# Patient Record
Sex: FEMALE | Race: WHITE | NOT HISPANIC OR LATINO | Employment: OTHER | URBAN - METROPOLITAN AREA
[De-identification: names, ages, dates, MRNs, and addresses within clinical notes are randomized per-mention and may not be internally consistent; named-entity substitution may affect disease eponyms.]

---

## 2018-01-26 ENCOUNTER — OFFICE VISIT (OUTPATIENT)
Dept: URGENT CARE | Facility: MEDICAL CENTER | Age: 68
End: 2018-01-26
Payer: MEDICARE

## 2018-01-26 VITALS
RESPIRATION RATE: 16 BRPM | TEMPERATURE: 98.5 F | DIASTOLIC BLOOD PRESSURE: 78 MMHG | SYSTOLIC BLOOD PRESSURE: 114 MMHG | HEART RATE: 83 BPM | OXYGEN SATURATION: 97 %

## 2018-01-26 DIAGNOSIS — H61.21 HEARING LOSS SECONDARY TO CERUMEN IMPACTION, RIGHT: Primary | ICD-10-CM

## 2018-01-26 PROCEDURE — 69209 REMOVE IMPACTED EAR WAX UNI: CPT | Performed by: PHYSICIAN ASSISTANT

## 2018-01-26 PROCEDURE — G0463 HOSPITAL OUTPT CLINIC VISIT: HCPCS | Performed by: PHYSICIAN ASSISTANT

## 2018-01-26 PROCEDURE — 99213 OFFICE O/P EST LOW 20 MIN: CPT | Performed by: PHYSICIAN ASSISTANT

## 2018-01-26 NOTE — PROGRESS NOTES
Assessment/Plan:      Diagnoses and all orders for this visit:    Hearing loss secondary to cerumen impaction, right    Other orders  -     Ear cerumen removal      Ear cerumen removal  Date/Time: 1/26/2018 6:15 PM  Performed by: Ranjana Lyons  Authorized by: Ranjana Lyons     Patient location:  Clinic  Indications / Diagnosis:  Hearing loss secondary to cerumen impaction  Other Assisting Provider: No    Consent:     Consent obtained:  Verbal    Consent given by:  Patient    Risks discussed:  Dizziness, incomplete removal, pain and TM perforation    Alternatives discussed:  No treatment  Universal protocol:     Procedure explained and questions answered to patient or proxy's satisfaction: yes      Patient identity confirmed:  Verbally with patient  Procedure details:     Location:  R ear and L ear    Procedure type: curette      Procedure type comment:  Currette and irrigation  Post-procedure details:     Complication:  None    Hearing quality:  Improved    Patient tolerance of procedure: Tolerated well, no immediate complications          Patient Instructions   Ears were flushed with water  Patient tolerated procedure well  You may try Debrox drops or a mixture of half water half hydrogen peroxide to avoid wax build up in the future  Follow up with your PCP as needed  Subjective:     Patient ID: Horacio Gama is a 74 y o  female  This is a 74year old female presenting for hearing loss secondary to cerumen impaction of the right ear  This has happened to her in the past, always in the right ear  She has noticed it gradually worsening over the last couple of weeks  No pain associated with this  Review of Systems   Constitutional: Negative  HENT: Positive for hearing loss  Negative for congestion, ear discharge, ear pain, postnasal drip, rhinorrhea, sinus pain, sinus pressure and sore throat  Respiratory: Negative  Cardiovascular: Negative  Neurological: Negative  Objective:  Vitals:    01/26/18 1749   BP: 114/78   Pulse: 83   Resp: 16   Temp: 98 5 °F (36 9 °C)   SpO2: 97%        Physical Exam   Constitutional: She appears well-developed and well-nourished  No distress  HENT:   Head: Normocephalic and atraumatic  Right Ear: External ear normal  No drainage, swelling or tenderness  No foreign bodies  No mastoid tenderness  Decreased hearing is noted  Left Ear: Tympanic membrane, external ear and ear canal normal    Ears:    Nose: Nose normal    Mouth/Throat: Oropharynx is clear and moist  No oropharyngeal exudate  Eyes: Conjunctivae and EOM are normal  Pupils are equal, round, and reactive to light  Cardiovascular: Normal rate, regular rhythm and normal heart sounds  Pulmonary/Chest: Effort normal and breath sounds normal    Vitals reviewed

## 2018-01-26 NOTE — PATIENT INSTRUCTIONS
Ears were flushed with water  Patient tolerated procedure well  You may try Debrox drops or a mixture of half water half hydrogen peroxide to avoid wax build up in the future  Follow up with your PCP as needed  Cerumen Impaction   WHAT YOU NEED TO KNOW:   What is cerumen impaction? Cerumen impaction is the blockage of the outer ear canal by tightly packed cerumen (earwax)  What causes cerumen impaction? Anything that affects the normal outward flow of cerumen may cause impaction  The following factors may make it more likely for earwax to become impacted:  · Advanced age     · Conditions that produce too much cerumen, such as keratosis and other skin diseases    · Narrow or abnormally shaped ear canals    · Use of a hearing aid    · Incorrect use of cotton swabs, or using needles, hair pins, or other objects to clean the ears  What are the signs and symptoms of cerumen impaction? · Trouble hearing    · Dizziness    · Ear fullness or a feeling that something is plugging up your ear    · Itchiness or pain in the ears    · Ringing in the ears  How is cerumen impaction diagnosed? Your healthcare provider will ask about your medical history  This includes any ear problems or procedures you may have had  Your healthcare provider will carefully check your ears using a scope with a light  Your healthcare provider may look for other problems, such as bleeding, infection, or injury  Your eardrums will be checked for tears or holes  Your healthcare provider may also test your hearing  How is cerumen impaction treated? The goal of treatment is to remove the hardened wax  The type of treatment to be used may depend on your age, symptoms, or risk factors  Ask your healthcare provider which of the following treatments may be best for you:  · Ear drops:  Ear drops may be used to clear or soften the impacted earwax  This may be used alone or in combination with a procedure to take out the earwax      · Procedures: When the impaction can be clearly seen, removal may be done using any of the following:    ¨ Irrigation:  Warm water from a syringe is used to wash the wax out of the ear canal  Irrigation may not be used on people with an eardrum tear, infection, or who have had ear surgery  ¨ Suction:  A small plastic tube that is connected to a machine is used to suck the impacted wax out of your ear  ¨ Instruments:  Your healthcare provider may use a curette (scoop-like instrument) or forceps to remove the impacted wax  What are the risks of having a cerumen impaction? · Procedures to remove the wax may cause bleeding and infection  The ear canal may be scraped and scratched or the eardrum may be injured, which may cause loss of hearing  · Untreated impacted cerumen may cause your symptoms to become worse  If it is not removed, impacted cerumen may cause an infection, irritation, loss of hearing, or further ear problems  When should I contact my healthcare provider? · You have a fever  · You have trouble hearing or hear ringing noises  · You have questions or concerns about your condition or care  When should I seek immediate care? · You feel dizzy  · You have discharge or blood coming out of your ear  · Your ear pain does not go away or gets worse  CARE AGREEMENT:   You have the right to help plan your care  Learn about your health condition and how it may be treated  Discuss treatment options with your caregivers to decide what care you want to receive  You always have the right to refuse treatment  The above information is an  only  It is not intended as medical advice for individual conditions or treatments  Talk to your doctor, nurse or pharmacist before following any medical regimen to see if it is safe and effective for you  © 2017 [employer] Information is for End User's use only and may not be sold, redistributed or otherwise used for commercial purposes  All illustrations and images included in CareNotes® are the copyrighted property of [employer]  or Sukhdev Amaro

## 2020-04-01 ENCOUNTER — APPOINTMENT (EMERGENCY)
Dept: CT IMAGING | Facility: HOSPITAL | Age: 70
End: 2020-04-01
Payer: MEDICARE

## 2020-04-01 ENCOUNTER — HOSPITAL ENCOUNTER (EMERGENCY)
Facility: HOSPITAL | Age: 70
Discharge: HOME | End: 2020-04-01
Attending: EMERGENCY MEDICINE | Admitting: EMERGENCY MEDICINE
Payer: MEDICARE

## 2020-04-01 VITALS
WEIGHT: 192.68 LBS | BODY MASS INDEX: 31.1 KG/M2 | RESPIRATION RATE: 16 BRPM | OXYGEN SATURATION: 96 % | HEART RATE: 54 BPM | DIASTOLIC BLOOD PRESSURE: 64 MMHG | SYSTOLIC BLOOD PRESSURE: 128 MMHG | TEMPERATURE: 98 F

## 2020-04-01 DIAGNOSIS — R00.1 BRADYCARDIA: ICD-10-CM

## 2020-04-01 DIAGNOSIS — R11.0 NAUSEA: ICD-10-CM

## 2020-04-01 DIAGNOSIS — R42 DIZZINESS: Primary | ICD-10-CM

## 2020-04-01 LAB
ALBUMIN SERPL BCP-MCNC: 4.2 G/DL (ref 3.5–5)
ALP SERPL-CCNC: 75 U/L (ref 46–116)
ALT SERPL W P-5'-P-CCNC: 36 U/L (ref 12–78)
ANION GAP SERPL CALCULATED.3IONS-SCNC: 11 MMOL/L (ref 4–13)
APTT PPP: 31 SECONDS (ref 23–37)
AST SERPL W P-5'-P-CCNC: 16 U/L (ref 5–45)
ATRIAL RATE: 62 BPM
BASOPHILS # BLD AUTO: 0.03 THOUSANDS/ΜL (ref 0–0.1)
BASOPHILS NFR BLD AUTO: 0 % (ref 0–1)
BILIRUB SERPL-MCNC: 0.83 MG/DL (ref 0.2–1)
BUN SERPL-MCNC: 24 MG/DL (ref 5–25)
CALCIUM SERPL-MCNC: 9.3 MG/DL (ref 8.3–10.1)
CHLORIDE SERPL-SCNC: 104 MMOL/L (ref 100–108)
CO2 SERPL-SCNC: 25 MMOL/L (ref 21–32)
CREAT SERPL-MCNC: 0.96 MG/DL (ref 0.6–1.3)
EOSINOPHIL # BLD AUTO: 0.02 THOUSAND/ΜL (ref 0–0.61)
EOSINOPHIL NFR BLD AUTO: 0 % (ref 0–6)
ERYTHROCYTE [DISTWIDTH] IN BLOOD BY AUTOMATED COUNT: 13.8 % (ref 11.6–15.1)
GFR SERPL CREATININE-BSD FRML MDRD: 61 ML/MIN/1.73SQ M
GLUCOSE SERPL-MCNC: 103 MG/DL (ref 65–140)
HCT VFR BLD AUTO: 49 % (ref 34.8–46.1)
HGB BLD-MCNC: 16 G/DL (ref 11.5–15.4)
IMM GRANULOCYTES # BLD AUTO: 0.01 THOUSAND/UL (ref 0–0.2)
IMM GRANULOCYTES NFR BLD AUTO: 0 % (ref 0–2)
INR PPP: 1.1 (ref 0.84–1.19)
LYMPHOCYTES # BLD AUTO: 1.86 THOUSANDS/ΜL (ref 0.6–4.47)
LYMPHOCYTES NFR BLD AUTO: 21 % (ref 14–44)
MCH RBC QN AUTO: 29.3 PG (ref 26.8–34.3)
MCHC RBC AUTO-ENTMCNC: 32.7 G/DL (ref 31.4–37.4)
MCV RBC AUTO: 90 FL (ref 82–98)
MONOCYTES # BLD AUTO: 0.5 THOUSAND/ΜL (ref 0.17–1.22)
MONOCYTES NFR BLD AUTO: 6 % (ref 4–12)
NEUTROPHILS # BLD AUTO: 6.6 THOUSANDS/ΜL (ref 1.85–7.62)
NEUTS SEG NFR BLD AUTO: 73 % (ref 43–75)
NRBC BLD AUTO-RTO: 0 /100 WBCS
P AXIS: 49 DEGREES
PLATELET # BLD AUTO: 359 THOUSANDS/UL (ref 149–390)
PMV BLD AUTO: 9.7 FL (ref 8.9–12.7)
POTASSIUM SERPL-SCNC: 3.8 MMOL/L (ref 3.5–5.3)
PR INTERVAL: 166 MS
PROT SERPL-MCNC: 8.2 G/DL (ref 6.4–8.2)
PROTHROMBIN TIME: 13.6 SECONDS (ref 11.6–14.5)
QRS AXIS: 35 DEGREES
QRSD INTERVAL: 92 MS
QT INTERVAL: 470 MS
QTC INTERVAL: 466 MS
RBC # BLD AUTO: 5.47 MILLION/UL (ref 3.81–5.12)
SODIUM SERPL-SCNC: 140 MMOL/L (ref 136–145)
T WAVE AXIS: 13 DEGREES
TROPONIN I SERPL-MCNC: <0.02 NG/ML
TSH SERPL DL<=0.05 MIU/L-ACNC: 2.62 UIU/ML (ref 0.36–3.74)
VENTRICULAR RATE: 59 BPM
WBC # BLD AUTO: 9.02 THOUSAND/UL (ref 4.31–10.16)

## 2020-04-01 PROCEDURE — 85025 COMPLETE CBC W/AUTO DIFF WBC: CPT | Performed by: EMERGENCY MEDICINE

## 2020-04-01 PROCEDURE — 70498 CT ANGIOGRAPHY NECK: CPT

## 2020-04-01 PROCEDURE — 93010 ELECTROCARDIOGRAM REPORT: CPT | Performed by: INTERNAL MEDICINE

## 2020-04-01 PROCEDURE — 99285 EMERGENCY DEPT VISIT HI MDM: CPT

## 2020-04-01 PROCEDURE — 96374 THER/PROPH/DIAG INJ IV PUSH: CPT

## 2020-04-01 PROCEDURE — 36415 COLL VENOUS BLD VENIPUNCTURE: CPT | Performed by: EMERGENCY MEDICINE

## 2020-04-01 PROCEDURE — 93005 ELECTROCARDIOGRAM TRACING: CPT

## 2020-04-01 PROCEDURE — 85610 PROTHROMBIN TIME: CPT | Performed by: EMERGENCY MEDICINE

## 2020-04-01 PROCEDURE — 70496 CT ANGIOGRAPHY HEAD: CPT

## 2020-04-01 PROCEDURE — 85730 THROMBOPLASTIN TIME PARTIAL: CPT | Performed by: EMERGENCY MEDICINE

## 2020-04-01 PROCEDURE — 84484 ASSAY OF TROPONIN QUANT: CPT | Performed by: EMERGENCY MEDICINE

## 2020-04-01 PROCEDURE — 84443 ASSAY THYROID STIM HORMONE: CPT | Performed by: EMERGENCY MEDICINE

## 2020-04-01 PROCEDURE — 96361 HYDRATE IV INFUSION ADD-ON: CPT

## 2020-04-01 PROCEDURE — 80053 COMPREHEN METABOLIC PANEL: CPT | Performed by: EMERGENCY MEDICINE

## 2020-04-01 PROCEDURE — 99284 EMERGENCY DEPT VISIT MOD MDM: CPT | Performed by: EMERGENCY MEDICINE

## 2020-04-01 RX ORDER — ONDANSETRON 2 MG/ML
4 INJECTION INTRAMUSCULAR; INTRAVENOUS ONCE
Status: COMPLETED | OUTPATIENT
Start: 2020-04-01 | End: 2020-04-01

## 2020-04-01 RX ORDER — MECLIZINE HCL 12.5 MG/1
25 TABLET ORAL ONCE
Status: COMPLETED | OUTPATIENT
Start: 2020-04-01 | End: 2020-04-01

## 2020-04-01 RX ORDER — MECLIZINE HYDROCHLORIDE 25 MG/1
25 TABLET ORAL 3 TIMES DAILY PRN
Qty: 15 TABLET | Refills: 0 | Status: SHIPPED | OUTPATIENT
Start: 2020-04-01 | End: 2020-04-03 | Stop reason: SDUPTHER

## 2020-04-01 RX ADMIN — MECLIZINE 25 MG: 12.5 TABLET ORAL at 15:36

## 2020-04-01 RX ADMIN — IOHEXOL 100 ML: 350 INJECTION, SOLUTION INTRAVENOUS at 16:24

## 2020-04-01 RX ADMIN — ONDANSETRON 4 MG: 2 INJECTION INTRAMUSCULAR; INTRAVENOUS at 15:37

## 2020-04-01 RX ADMIN — SODIUM CHLORIDE 1000 ML: 0.9 INJECTION, SOLUTION INTRAVENOUS at 15:36

## 2020-04-03 ENCOUNTER — TELEMEDICINE (OUTPATIENT)
Dept: INTERNAL MEDICINE CLINIC | Facility: CLINIC | Age: 70
End: 2020-04-03
Payer: MEDICARE

## 2020-04-03 ENCOUNTER — TELEPHONE (OUTPATIENT)
Dept: OTHER | Facility: OTHER | Age: 70
End: 2020-04-03

## 2020-04-03 DIAGNOSIS — R00.1 BRADYCARDIA: Primary | ICD-10-CM

## 2020-04-03 DIAGNOSIS — R42 DIZZINESS: ICD-10-CM

## 2020-04-03 PROCEDURE — 99201 PR OFFICE OUTPATIENT NEW 10 MINUTES: CPT | Performed by: NURSE PRACTITIONER

## 2020-04-03 RX ORDER — MECLIZINE HYDROCHLORIDE 25 MG/1
25 TABLET ORAL 3 TIMES DAILY PRN
Qty: 15 TABLET | Refills: 0 | Status: SHIPPED | OUTPATIENT
Start: 2020-04-03 | End: 2020-07-24 | Stop reason: ALTCHOICE

## 2020-07-24 ENCOUNTER — OFFICE VISIT (OUTPATIENT)
Dept: INTERNAL MEDICINE CLINIC | Facility: CLINIC | Age: 70
End: 2020-07-24
Payer: MEDICARE

## 2020-07-24 VITALS
TEMPERATURE: 97.3 F | SYSTOLIC BLOOD PRESSURE: 130 MMHG | HEART RATE: 67 BPM | BODY MASS INDEX: 32.32 KG/M2 | OXYGEN SATURATION: 95 % | WEIGHT: 194 LBS | HEIGHT: 65 IN | DIASTOLIC BLOOD PRESSURE: 80 MMHG

## 2020-07-24 DIAGNOSIS — Z11.59 NEED FOR HEPATITIS C SCREENING TEST: ICD-10-CM

## 2020-07-24 DIAGNOSIS — Z13.820 SCREENING FOR OSTEOPOROSIS: ICD-10-CM

## 2020-07-24 DIAGNOSIS — Z23 NEED FOR VACCINATION: ICD-10-CM

## 2020-07-24 DIAGNOSIS — Z00.00 MEDICARE ANNUAL WELLNESS VISIT, SUBSEQUENT: ICD-10-CM

## 2020-07-24 DIAGNOSIS — E66.9 OBESITY (BMI 30.0-34.9): ICD-10-CM

## 2020-07-24 DIAGNOSIS — Z13.220 SCREENING, LIPID: ICD-10-CM

## 2020-07-24 DIAGNOSIS — H81.10 BENIGN PAROXYSMAL POSITIONAL VERTIGO, UNSPECIFIED LATERALITY: Primary | ICD-10-CM

## 2020-07-24 DIAGNOSIS — Z12.31 ENCOUNTER FOR SCREENING MAMMOGRAM FOR BREAST CANCER: ICD-10-CM

## 2020-07-24 PROCEDURE — 1125F AMNT PAIN NOTED PAIN PRSNT: CPT | Performed by: INTERNAL MEDICINE

## 2020-07-24 PROCEDURE — 1123F ACP DISCUSS/DSCN MKR DOCD: CPT

## 2020-07-24 PROCEDURE — G0009 ADMIN PNEUMOCOCCAL VACCINE: HCPCS

## 2020-07-24 PROCEDURE — 1170F FXNL STATUS ASSESSED: CPT | Performed by: INTERNAL MEDICINE

## 2020-07-24 PROCEDURE — 3008F BODY MASS INDEX DOCD: CPT | Performed by: INTERNAL MEDICINE

## 2020-07-24 PROCEDURE — 1160F RVW MEDS BY RX/DR IN RCRD: CPT | Performed by: INTERNAL MEDICINE

## 2020-07-24 PROCEDURE — 99214 OFFICE O/P EST MOD 30 MIN: CPT | Performed by: INTERNAL MEDICINE

## 2020-07-24 PROCEDURE — 4040F PNEUMOC VAC/ADMIN/RCVD: CPT | Performed by: INTERNAL MEDICINE

## 2020-07-24 PROCEDURE — G0438 PPPS, INITIAL VISIT: HCPCS | Performed by: INTERNAL MEDICINE

## 2020-07-24 PROCEDURE — 90670 PCV13 VACCINE IM: CPT

## 2020-07-24 PROCEDURE — 1036F TOBACCO NON-USER: CPT | Performed by: INTERNAL MEDICINE

## 2020-07-24 NOTE — PROGRESS NOTES
Assessment and Plan:     Problem List Items Addressed This Visit     None      Visit Diagnoses     Benign paroxysmal positional vertigo, unspecified laterality    -  Primary    Obesity (BMI 30 0-34 9)        Need for hepatitis C screening test        Relevant Orders    Hepatitis C antibody    Screening, lipid        Relevant Orders    Lipid Panel with Direct LDL reflex    Encounter for screening mammogram for breast cancer        Relevant Orders    Mammo screening bilateral w cad    Need for vaccination        Relevant Orders    PNEUMOCOCCAL CONJUGATE VACCINE 13-VALENT GREATER THAN 6 MONTHS (Completed)    Screening for osteoporosis        Relevant Orders    DXA bone density spine hip and pelvis    Medicare annual wellness visit, subsequent            BMI Counseling: Body mass index is 32 28 kg/m²  The BMI is above normal  Nutrition recommendations include decreasing portion sizes  Exercise recommendations include exercising 3-5 times per week  Preventive health issues were discussed with patient, and age appropriate screening tests were ordered as noted in patient's After Visit Summary  Personalized health advice and appropriate referrals for health education or preventive services given if needed, as noted in patient's After Visit Summary  History of Present Illness:     Patient presents for Medicare Annual Wellness visit    Patient Care Team:  Sheeba Sánchez MD as PCP - General (Internal Medicine)     Problem List:     Patient Active Problem List   Diagnosis    Bradycardia      Past Medical and Surgical History:     Past Medical History:   Diagnosis Date    Vertigo      History reviewed  No pertinent surgical history  Family History:     Family History   Problem Relation Age of Onset    Dementia Mother     Heart attack Father     Brain cancer Maternal Aunt     Coronary artery disease Maternal Uncle       Social History:        Social History     Socioeconomic History    Marital status:   Spouse name: None    Number of children: None    Years of education: None    Highest education level: None   Occupational History    None   Social Needs    Financial resource strain: None    Food insecurity:     Worry: None     Inability: None    Transportation needs:     Medical: None     Non-medical: None   Tobacco Use    Smoking status: Never Smoker    Smokeless tobacco: Never Used   Substance and Sexual Activity    Alcohol use: Yes     Comment: occasionally    Drug use: None    Sexual activity: None   Lifestyle    Physical activity:     Days per week: None     Minutes per session: None    Stress: None   Relationships    Social connections:     Talks on phone: None     Gets together: None     Attends [employer] service: None     Active member of club or organization: None     Attends meetings of clubs or organizations: None     Relationship status: None    Intimate partner violence:     Fear of current or ex partner: None     Emotionally abused: None     Physically abused: None     Forced sexual activity: None   Other Topics Concern    None   Social History Narrative    None      Medications and Allergies:     No current outpatient medications on file  No current facility-administered medications for this visit  No Known Allergies   Immunizations:     Immunization History   Administered Date(s) Administered    INFLUENZA 10/04/2017    Pneumococcal Conjugate 13-Valent 07/24/2020      Health Maintenance:         Topic Date Due    Hepatitis C Screening  1950    MAMMOGRAM  1950    CRC Screening: Colonoscopy  1950         Topic Date Due    Pneumococcal Vaccine: 65+ Years (1 of 2 - PCV13) 06/28/2017    Influenza Vaccine  07/01/2020      Medicare Health Risk Assessment:     /80   Pulse 67   Temp (!) 97 3 °F (36 3 °C)   Ht 5' 5" (1 651 m)   Wt 88 kg (194 lb)   SpO2 95%   BMI 32 28 kg/m²      Dillon Raphaelgonzalo is here for her Subsequent Wellness visit       Health Risk Assessment:   Patient rates overall health as very good  Patient feels that their physical health rating is same  Eyesight was rated as same  Hearing was rated as same  Patient feels that their emotional and mental health rating is same  Pain experienced in the last 7 days has been none  Patient states that she has experienced no weight loss or gain in last 6 months  Depression Screening:   PHQ-2 Score: 0      Fall Risk Screening: In the past year, patient has experienced: no history of falling in past year      Urinary Incontinence Screening:   Patient has not leaked urine accidently in the last six months  Home Safety:  Patient does not have trouble with stairs inside or outside of their home  Patient has working smoke alarms and has working carbon monoxide detector  Home safety hazards include: none  Nutrition:   Weight Watchers and low calorie    Medications:   Patient is not currently taking any over-the-counter supplements  Patient is able to manage medications  Activities of Daily Living (ADLs)/Instrumental Activities of Daily Living (IADLs):   Walk and transfer into and out of bed and chair?: Yes  Dress and groom yourself?: Yes    Bathe or shower yourself?: Yes    Feed yourself?  Yes  Do your laundry/housekeeping?: Yes  Manage your money, pay your bills and track your expenses?: Yes  Make your own meals?: Yes    Do your own shopping?: Yes    Durable Medical Equipment Suppliers  none    Previous Hospitalizations:   Any hospitalizations or ED visits within the last 12 months?: Yes    How many hospitalizations have you had in the last year?: 1-2    Advance Care Planning:   Living will: Yes    Advanced directive: Yes      Cognitive Screening:   Provider or family/friend/caregiver concerned regarding cognition?: No    PREVENTIVE SCREENINGS      Cardiovascular Screening:      Due for: Lipid Panel      Diabetes Screening:     General: Screening Current      Colorectal Cancer Screening:     General: Screening Current      Breast Cancer Screening:     General: Screening Current      Cervical Cancer Screening:    General: Screening Not Indicated      Osteoporosis Screening:    General: Risks and Benefits Discussed    Due for: DXA Axial      Abdominal Aortic Aneurysm (AAA) Screening:        General: Screening Not Indicated      Lung Cancer Screening:     General: Screening Not Indicated      Hepatitis C Screening:    General: Risks and Benefits Discussed    Hep C Screening Accepted: Yes        Merlinda Abraham, MD

## 2020-07-24 NOTE — PROGRESS NOTES
Assessment/Plan:  BPPV resolved  Call if vertigo recurs       Problem List Items Addressed This Visit     None      Visit Diagnoses     Benign paroxysmal positional vertigo, unspecified laterality    -  Primary    Obesity (BMI 30 0-34 9)        Need for hepatitis C screening test        Relevant Orders    Hepatitis C antibody    Screening, lipid        Relevant Orders    Lipid Panel with Direct LDL reflex    Encounter for screening mammogram for breast cancer        Relevant Orders    Mammo screening bilateral w cad    Need for vaccination        Relevant Orders    PNEUMOCOCCAL CONJUGATE VACCINE 13-VALENT GREATER THAN 6 MONTHS (Completed)    Screening for osteoporosis        Relevant Orders    DXA bone density spine hip and pelvis    Medicare annual wellness visit, subsequent                Subjective:      Patient ID: Mireya Bustamante is a 78 y o  female  Here to establish care with me  She was in the ER in April for sudden vertigo  She woke up with the room spinning and called 911  CTA of head and neck normal, EKG sinus katy HR 59  Blood work normal  Meclizine prescribed and helped  This resolved after 3 weeks  Generally healthy  She saw a PCP in [place] but has not seen him in >1 year  She saw a nutritionist in  and has lost 42lbs since-Weight Watchers and low calorie  Also exercising  Lives with son and daughter in law  ,   at 45 of a heart attack    The following portions of the patient's history were reviewed and updated as appropriate: allergies, current medications, past family history, past medical history, past social history, past surgical history and problem list     Review of Systems   Constitutional: Negative for chills and fever  HENT: Negative for congestion and hearing loss  Respiratory: Negative for cough and shortness of breath  Cardiovascular: Negative for chest pain and palpitations  Gastrointestinal: Negative for blood in stool, constipation and diarrhea  Genitourinary: Negative for difficulty urinating, dysuria, hematuria and vaginal bleeding  Musculoskeletal: Negative for arthralgias and myalgias  Neurological: Negative for dizziness and headaches  Psychiatric/Behavioral: Negative for dysphoric mood and sleep disturbance  The patient is not nervous/anxious  Objective:      /80   Pulse 67   Temp (!) 97 3 °F (36 3 °C)   Ht 5' 5" (1 651 m)   Wt 88 kg (194 lb)   SpO2 95%   BMI 32 28 kg/m²          Physical Exam   Constitutional: She is oriented to person, place, and time  She appears well-developed and well-nourished  No distress  HENT:   Head: Normocephalic and atraumatic  Right Ear: External ear normal    Left Ear: External ear normal    Moderate cerumen AD   Eyes: Conjunctivae are normal    Neck: Neck supple  Cardiovascular: Normal rate, regular rhythm and normal heart sounds  No murmur heard  Pulmonary/Chest: Effort normal and breath sounds normal  No stridor  No respiratory distress  She has no wheezes  She has no rales  Abdominal: Soft  She exhibits no distension and no mass  There is no tenderness  There is no rebound and no guarding  Musculoskeletal: She exhibits no edema  Neurological: She is alert and oriented to person, place, and time  Skin: Skin is warm and dry  She is not diaphoretic  Psychiatric: She has a normal mood and affect  Her behavior is normal  Judgment and thought content normal    Vitals reviewed

## 2020-07-27 ENCOUNTER — APPOINTMENT (OUTPATIENT)
Dept: LAB | Facility: MEDICAL CENTER | Age: 70
End: 2020-07-27
Payer: MEDICARE

## 2020-07-27 LAB
CHOLEST SERPL-MCNC: 245 MG/DL (ref 50–200)
HCV AB SER QL: NORMAL
HDLC SERPL-MCNC: 61 MG/DL
LDLC SERPL CALC-MCNC: 159 MG/DL (ref 0–100)
TRIGL SERPL-MCNC: 123 MG/DL

## 2020-07-27 PROCEDURE — 80061 LIPID PANEL: CPT | Performed by: INTERNAL MEDICINE

## 2020-07-27 PROCEDURE — 86803 HEPATITIS C AB TEST: CPT | Performed by: INTERNAL MEDICINE

## 2020-07-27 PROCEDURE — 36415 COLL VENOUS BLD VENIPUNCTURE: CPT | Performed by: INTERNAL MEDICINE

## 2020-12-21 ENCOUNTER — TELEMEDICINE (OUTPATIENT)
Dept: INTERNAL MEDICINE CLINIC | Facility: CLINIC | Age: 70
End: 2020-12-21
Payer: MEDICARE

## 2020-12-21 DIAGNOSIS — N30.00 ACUTE CYSTITIS WITHOUT HEMATURIA: Primary | ICD-10-CM

## 2020-12-21 PROCEDURE — 99213 OFFICE O/P EST LOW 20 MIN: CPT | Performed by: INTERNAL MEDICINE

## 2020-12-21 RX ORDER — NITROFURANTOIN 25; 75 MG/1; MG/1
100 CAPSULE ORAL 2 TIMES DAILY
Qty: 10 CAPSULE | Refills: 0 | Status: SHIPPED | OUTPATIENT
Start: 2020-12-21 | End: 2020-12-26

## 2021-01-19 ENCOUNTER — TELEPHONE (OUTPATIENT)
Dept: INTERNAL MEDICINE CLINIC | Facility: CLINIC | Age: 71
End: 2021-01-19

## 2021-01-19 NOTE — TELEPHONE ENCOUNTER
Patient called requesting more information about the COVID vaccine  I d/w her that I recommend she get it once it is available to her  She has no documented allergies to other vaccines so there is no reason for her to not get it  No long term side effects are known at this time  It is possible that this is a vaccine we all will need to get again  Local injection site reactions can happen as well as flu like symptoms  Her questions were answered  She is reassured and will sign up to register for it

## 2021-03-10 DIAGNOSIS — Z23 ENCOUNTER FOR IMMUNIZATION: ICD-10-CM

## 2021-03-18 ENCOUNTER — IMMUNIZATIONS (OUTPATIENT)
Dept: FAMILY MEDICINE CLINIC | Facility: HOSPITAL | Age: 71
End: 2021-03-18

## 2021-03-18 DIAGNOSIS — Z23 ENCOUNTER FOR IMMUNIZATION: Primary | ICD-10-CM

## 2021-03-18 PROCEDURE — 91300 SARS-COV-2 / COVID-19 MRNA VACCINE (PFIZER-BIONTECH) 30 MCG: CPT

## 2021-03-18 PROCEDURE — 0001A SARS-COV-2 / COVID-19 MRNA VACCINE (PFIZER-BIONTECH) 30 MCG: CPT

## 2021-04-11 ENCOUNTER — IMMUNIZATIONS (OUTPATIENT)
Dept: FAMILY MEDICINE CLINIC | Facility: HOSPITAL | Age: 71
End: 2021-04-11

## 2021-04-11 DIAGNOSIS — Z23 ENCOUNTER FOR IMMUNIZATION: Primary | ICD-10-CM

## 2021-04-11 PROCEDURE — 0002A SARS-COV-2 / COVID-19 MRNA VACCINE (PFIZER-BIONTECH) 30 MCG: CPT

## 2021-04-11 PROCEDURE — 91300 SARS-COV-2 / COVID-19 MRNA VACCINE (PFIZER-BIONTECH) 30 MCG: CPT

## 2021-05-04 ENCOUNTER — HOSPITAL ENCOUNTER (OUTPATIENT)
Dept: RADIOLOGY | Facility: MEDICAL CENTER | Age: 71
Discharge: HOME | End: 2021-05-04
Payer: MEDICARE

## 2021-05-04 DIAGNOSIS — Z13.820 SCREENING FOR OSTEOPOROSIS: ICD-10-CM

## 2021-05-04 PROCEDURE — 77080 DXA BONE DENSITY AXIAL: CPT

## 2021-07-12 ENCOUNTER — HOSPITAL ENCOUNTER (OUTPATIENT)
Dept: RADIOLOGY | Facility: MEDICAL CENTER | Age: 71
Discharge: HOME | End: 2021-07-12
Payer: MEDICARE

## 2021-07-12 VITALS — BODY MASS INDEX: 32.32 KG/M2 | WEIGHT: 194 LBS | HEIGHT: 65 IN

## 2021-07-12 DIAGNOSIS — Z12.31 ENCOUNTER FOR SCREENING MAMMOGRAM FOR BREAST CANCER: ICD-10-CM

## 2021-07-12 PROCEDURE — 77063 BREAST TOMOSYNTHESIS BI: CPT

## 2021-07-12 PROCEDURE — 77067 SCR MAMMO BI INCL CAD: CPT

## 2021-07-26 RX ORDER — IBUPROFEN 600 MG/1
TABLET ORAL
COMMUNITY
Start: 2021-06-17 | End: 2022-08-01 | Stop reason: ALTCHOICE

## 2021-07-27 ENCOUNTER — OFFICE VISIT (OUTPATIENT)
Dept: INTERNAL MEDICINE CLINIC | Facility: CLINIC | Age: 71
End: 2021-07-27
Payer: MEDICARE

## 2021-07-27 VITALS
DIASTOLIC BLOOD PRESSURE: 82 MMHG | BODY MASS INDEX: 33.29 KG/M2 | WEIGHT: 195 LBS | HEART RATE: 73 BPM | HEIGHT: 64 IN | SYSTOLIC BLOOD PRESSURE: 132 MMHG | OXYGEN SATURATION: 94 %

## 2021-07-27 DIAGNOSIS — H61.21 IMPACTED CERUMEN OF RIGHT EAR: ICD-10-CM

## 2021-07-27 DIAGNOSIS — Z00.00 MEDICARE ANNUAL WELLNESS VISIT, SUBSEQUENT: Primary | ICD-10-CM

## 2021-07-27 DIAGNOSIS — Z23 ENCOUNTER FOR IMMUNIZATION: ICD-10-CM

## 2021-07-27 DIAGNOSIS — Z12.31 ENCOUNTER FOR SCREENING MAMMOGRAM FOR BREAST CANCER: ICD-10-CM

## 2021-07-27 DIAGNOSIS — E78.2 MIXED HYPERLIPIDEMIA: ICD-10-CM

## 2021-07-27 PROCEDURE — G0009 ADMIN PNEUMOCOCCAL VACCINE: HCPCS

## 2021-07-27 PROCEDURE — 69210 REMOVE IMPACTED EAR WAX UNI: CPT | Performed by: INTERNAL MEDICINE

## 2021-07-27 PROCEDURE — G0439 PPPS, SUBSEQ VISIT: HCPCS | Performed by: INTERNAL MEDICINE

## 2021-07-27 PROCEDURE — 90732 PPSV23 VACC 2 YRS+ SUBQ/IM: CPT

## 2021-07-27 PROCEDURE — 1123F ACP DISCUSS/DSCN MKR DOCD: CPT

## 2021-07-27 NOTE — PROGRESS NOTES
Assessment and Plan:     Problem List Items Addressed This Visit     None      Visit Diagnoses     Medicare annual wellness visit, subsequent    -  Primary    Encounter for immunization        Relevant Orders    PNEUMOCOCCAL POLYSACCHARIDE VACCINE 23-VALENT =>1YO SQ IM (Completed)    Mixed hyperlipidemia        Relevant Orders    CBC and differential    Comprehensive metabolic panel    Lipid Panel with Direct LDL reflex    Encounter for screening mammogram for breast cancer        Relevant Orders    Mammo screening bilateral w 3d & cad    Impacted cerumen of right ear        Relevant Orders    Ear cerumen removal        BMI Counseling: Body mass index is 33 47 kg/m²  The BMI is above normal  Nutrition recommendations include reducing intake of cholesterol  Preventive health issues were discussed with patient, and age appropriate screening tests were ordered as noted in patient's After Visit Summary  Personalized health advice and appropriate referrals for health education or preventive services given if needed, as noted in patient's After Visit Summary  History of Present Illness:     Patient presents for Medicare Annual Wellness visit    Patient Care Team:  Ivonne Boles MD as PCP - General (Internal Medicine)     Problem List:     Patient Active Problem List   Diagnosis    Bradycardia      Past Medical and Surgical History:     Past Medical History:   Diagnosis Date    Vertigo      History reviewed  No pertinent surgical history     Family History:     Family History   Problem Relation Age of Onset    Dementia Mother     Heart attack Father     Brain cancer Maternal Aunt     Coronary artery disease Maternal Uncle     No Known Problems Daughter     No Known Problems Maternal Grandmother     No Known Problems Maternal Grandfather     No Known Problems Paternal Grandmother     No Known Problems Paternal Grandfather     No Known Problems Brother     No Known Problems Brother     No Known Problems Son     No Known Problems Son     No Known Problems Son       Social History:     Social History     Socioeconomic History    Marital status:      Spouse name: None    Number of children: None    Years of education: None    Highest education level: None   Occupational History    None   Tobacco Use    Smoking status: Never Smoker    Smokeless tobacco: Never Used   Vaping Use    Vaping Use: Never used   Substance and Sexual Activity    Alcohol use: Yes     Comment: 1 per month    Drug use: Never    Sexual activity: Not Currently   Other Topics Concern    None   Social History Narrative    None     Social Determinants of Health     Financial Resource Strain:     Difficulty of Paying Living Expenses:    Food Insecurity:     Worried About Running Out of Food in the Last Year:     Ran Out of Food in the Last Year:    Transportation Needs:     Lack of Transportation (Medical):  Lack of Transportation (Non-Medical):    Physical Activity:     Days of Exercise per Week:     Minutes of Exercise per Session:    Stress:     Feeling of Stress :    Social Connections:     Frequency of Communication with Friends and Family:     Frequency of Social Gatherings with Friends and Family:     Attends Taoism Services:     Active Member of Clubs or Organizations:     Attends Club or Organization Meetings:     Marital Status:    Intimate Partner Violence:     Fear of Current or Ex-Partner:     Emotionally Abused:     Physically Abused:     Sexually Abused:       Medications and Allergies:     Current Outpatient Medications   Medication Sig Dispense Refill    ibuprofen (MOTRIN) 600 mg tablet TAKE 1 TABLET BY MOUTH 4 TIMES A DAY WITH FOOD (Patient not taking: Reported on 7/27/2021)       No current facility-administered medications for this visit       No Known Allergies   Immunizations:     Immunization History   Administered Date(s) Administered    INFLUENZA 10/04/2017, 09/05/2020    Pneumococcal Conjugate 13-Valent 07/24/2020    Pneumococcal Polysaccharide PPV23 07/27/2021    SARS-CoV-2 / COVID-19 mRNA IM (Pfizer-BioNTech) 03/18/2021, 04/11/2021      Health Maintenance:         Topic Date Due    Colorectal Cancer Screening  Never done    Breast Cancer Screening: Mammogram  07/12/2022    Hepatitis C Screening  Completed         Topic Date Due    Influenza Vaccine (1) 09/01/2021      Medicare Health Risk Assessment:     /82   Pulse 73   Ht 5' 4" (1 626 m)   Wt 88 5 kg (195 lb)   SpO2 94%   BMI 33 47 kg/m²      Michelle Sullivan is here for her Subsequent Wellness visit  Health Risk Assessment:   Patient rates overall health as very good  Patient feels that their physical health rating is same  Patient is very satisfied with their life  Eyesight was rated as same  Hearing was rated as same  Patient feels that their emotional and mental health rating is slightly better  Patients states they are never, rarely angry  Patient states they are never, rarely unusually tired/fatigued  Pain experienced in the last 7 days has been none  Patient states that she has experienced no weight loss or gain in last 6 months  Depression Screening:   PHQ-2 Score: 0      Fall Risk Screening: In the past year, patient has experienced: no history of falling in past year      Urinary Incontinence Screening:   Patient has not leaked urine accidently in the last six months  Home Safety:  Patient does not have trouble with stairs inside or outside of their home  Patient has working smoke alarms and has working carbon monoxide detector  Home safety hazards include: none  Nutrition:   Current diet is Other (please comment)  Weight watchers    Medications:   Patient is currently taking over-the-counter supplements  OTC medications include: see medication list  Patient is not able to manage medications       Activities of Daily Living (ADLs)/Instrumental Activities of Daily Living (IADLs):   Walk and transfer into and out of bed and chair?: Yes  Dress and groom yourself?: Yes    Bathe or shower yourself?: Yes    Do your laundry/housekeeping?: Yes  Manage your money, pay your bills and track your expenses?: Yes  Make your own meals?: Yes    Do your own shopping?: Yes    Durable Medical Equipment Suppliers  none    Previous Hospitalizations:   Any hospitalizations or ED visits within the last 12 months?: No      Advance Care Planning:   Living will: Yes    Durable POA for healthcare: Yes    Advanced directive: Yes      Cognitive Screening:   Provider or family/friend/caregiver concerned regarding cognition?: No    PREVENTIVE SCREENINGS      Cardiovascular Screening:    General: Screening Current    Due for: Lipid Panel      Diabetes Screening:       Due for: Blood Glucose      Colorectal Cancer Screening:     General: Screening Current      Breast Cancer Screening:     General: Screening Current      Cervical Cancer Screening:    General: Screening Not Indicated      Osteoporosis Screening:    General: Screening Current      Abdominal Aortic Aneurysm (AAA) Screening:        General: Screening Not Indicated      Lung Cancer Screening:     General: Screening Not Indicated      Hepatitis C Screening:    General: Screening Current      Preventive Screening Comments: She had a colonoscopy 2017 (records not received)  Discussed Shingrix at local pharmacy    Screening, Brief Intervention, and Referral to Treatment (SBIRT)    Screening  Typical number of drinks in a day: 0  Typical number of drinks in a week: 0  Interpretation: Low risk drinking behavior  Single Item Drug Screening:  How often have you used an illegal drug (including marijuana) or a prescription medication for non-medical reasons in the past year? never    Single Item Drug Screen Score: 0  Interpretation: Negative screen for possible drug use disorder  Review of Systems   Constitutional: Negative for chills, fatigue, fever and unexpected weight change  HENT: Positive for hearing loss (right)  Negative for congestion, rhinorrhea and sore throat  Respiratory: Negative for cough and shortness of breath  Cardiovascular: Negative for chest pain, palpitations and leg swelling  Gastrointestinal: Negative for abdominal pain, blood in stool, constipation, diarrhea, nausea and vomiting  Genitourinary: Negative for difficulty urinating, hematuria and vaginal bleeding  Musculoskeletal: Negative for arthralgias and myalgias  Neurological: Negative for dizziness and headaches  Physical Exam  Constitutional:       General: She is not in acute distress  Appearance: She is well-developed  She is not ill-appearing, toxic-appearing or diaphoretic  HENT:      Head: Normocephalic and atraumatic  Right Ear: There is impacted cerumen  Left Ear: External ear normal  There is no impacted cerumen  Eyes:      Conjunctiva/sclera: Conjunctivae normal    Cardiovascular:      Rate and Rhythm: Normal rate and regular rhythm  Heart sounds: Normal heart sounds  No murmur heard  Pulmonary:      Effort: Pulmonary effort is normal  No respiratory distress  Breath sounds: Normal breath sounds  No stridor  No wheezing or rales  Abdominal:      General: There is no distension  Palpations: Abdomen is soft  There is no mass  Tenderness: There is no abdominal tenderness  There is no guarding or rebound  Musculoskeletal:      Cervical back: Neck supple  Right lower leg: No edema  Left lower leg: No edema  Skin:     General: Skin is warm and dry  Neurological:      Mental Status: She is alert and oriented to person, place, and time  Psychiatric:         Mood and Affect: Mood normal          Behavior: Behavior normal          Thought Content:  Thought content normal          Judgment: Judgment normal      Ear cerumen removal    Date/Time: 7/27/2021 11:00 AM  Performed by: Natalie Farr MD  Authorized by: Natalie Farr MD     Patient location:  Clinic  Procedure details:     Location:  R ear    Procedure type: irrigation with instrumentation      Instrumentation: curette and forceps    Post-procedure details:     Complication:  None    Hearing quality:  Improved    Patient tolerance of procedure:   Tolerated well, no immediate complications        Henrry Schulte MD

## 2021-07-27 NOTE — PATIENT INSTRUCTIONS
Medicare Preventive Visit Patient Instructions  Thank you for completing your Welcome to Medicare Visit or Medicare Annual Wellness Visit today  Your next wellness visit will be due in one year (7/28/2022)  The screening/preventive services that you may require over the next 5-10 years are detailed below  Some tests may not apply to you based off risk factors and/or age  Screening tests ordered at today's visit but not completed yet may show as past due  Also, please note that scanned in results may not display below  Preventive Screenings:  Service Recommendations Previous Testing/Comments   Colorectal Cancer Screening  * Colonoscopy    * Fecal Occult Blood Test (FOBT)/Fecal Immunochemical Test (FIT)  * Fecal DNA/Cologuard Test  * Flexible Sigmoidoscopy Age: 54-65 years old   Colonoscopy: every 10 years (may be performed more frequently if at higher risk)  OR  FOBT/FIT: every 1 year  OR  Cologuard: every 3 years  OR  Sigmoidoscopy: every 5 years  Screening may be recommended earlier than age 48 if at higher risk for colorectal cancer  Also, an individualized decision between you and your healthcare provider will decide whether screening between the ages of 74-80 would be appropriate  Colonoscopy: Not on file  FOBT/FIT: Not on file  Cologuard: Not on file  Sigmoidoscopy: Not on file          Breast Cancer Screening Age: 36 years old  Frequency: every 1-2 years  Not required if history of left and right mastectomy Mammogram: 07/12/2021        Cervical Cancer Screening Between the ages of 21-29, pap smear recommended once every 3 years  Between the ages of 33-67, can perform pap smear with HPV co-testing every 5 years     Recommendations may differ for women with a history of total hysterectomy, cervical cancer, or abnormal pap smears in past  Pap Smear: Not on file        Hepatitis C Screening Once for adults born between [facility]  More frequently in patients at high risk for Hepatitis C Hep C Antibody: 07/27/2020        Diabetes Screening 1-2 times per year if you're at risk for diabetes or have pre-diabetes Fasting glucose: No results in last 5 years   A1C: No results in last 5 years        Cholesterol Screening Once every 5 years if you don't have a lipid disorder  May order more often based on risk factors  Lipid panel: 07/27/2020          Other Preventive Screenings Covered by Medicare:  1  Abdominal Aortic Aneurysm (AAA) Screening: covered once if your at risk  You're considered to be at risk if you have a family history of AAA  2  Lung Cancer Screening: covers low dose CT scan once per year if you meet all of the following conditions: (1) Age 50-69; (2) No signs or symptoms of lung cancer; (3) Current smoker or have quit smoking within the last 15 years; (4) You have a tobacco smoking history of at least 30 pack years (packs per day multiplied by number of years you smoked); (5) You get a written order from a healthcare provider  3  Glaucoma Screening: covered annually if you're considered high risk: (1) You have diabetes OR (2) Family history of glaucoma OR (3)  aged 48 and older OR (3)  American aged 72 and older  3  Osteoporosis Screening: covered every 2 years if you meet one of the following conditions: (1) You're estrogen deficient and at risk for osteoporosis based off medical history and other findings; (2) Have a vertebral abnormality; (3) On glucocorticoid therapy for more than 3 months; (4) Have primary hyperparathyroidism; (5) On osteoporosis medications and need to assess response to drug therapy  · Last bone density test (DXA Scan): 05/04/2021   5  HIV Screening: covered annually if you're between the age of 15-65  Also covered annually if you are younger than 13 and older than 72 with risk factors for HIV infection  For pregnant patients, it is covered up to 3 times per pregnancy      Immunizations:  Immunization Recommendations   Influenza Vaccine Annual influenza vaccination during flu season is recommended for all persons aged >= 6 months who do not have contraindications   Pneumococcal Vaccine (Prevnar and Pneumovax)  * Prevnar = PCV13  * Pneumovax = PPSV23   Adults 25-60 years old: 1-3 doses may be recommended based on certain risk factors  Adults 72 years old: Prevnar (PCV13) vaccine recommended followed by Pneumovax (PPSV23) vaccine  If already received PPSV23 since turning 65, then PCV13 recommended at least one year after PPSV23 dose  Hepatitis B Vaccine 3 dose series if at intermediate or high risk (ex: diabetes, end stage renal disease, liver disease)   Tetanus (Td) Vaccine - COST NOT COVERED BY MEDICARE PART B Following completion of primary series, a booster dose should be given every 10 years to maintain immunity against tetanus  Td may also be given as tetanus wound prophylaxis  Tdap Vaccine - COST NOT COVERED BY MEDICARE PART B Recommended at least once for all adults  For pregnant patients, recommended with each pregnancy  Shingles Vaccine (Shingrix) - COST NOT COVERED BY MEDICARE PART B  2 shot series recommended in those aged 48 and above     Health Maintenance Due:      Topic Date Due    Colorectal Cancer Screening  Never done    Breast Cancer Screening: Mammogram  07/12/2022    Hepatitis C Screening  Completed     Immunizations Due:      Topic Date Due    Pneumococcal Vaccine: 65+ Years (2 of 2 - PPSV23) 07/24/2021    Influenza Vaccine (1) 09/01/2021     Advance Directives   What are advance directives? Advance directives are legal documents that state your wishes and plans for medical care  These plans are made ahead of time in case you lose your ability to make decisions for yourself  Advance directives can apply to any medical decision, such as the treatments you want, and if you want to donate organs  What are the types of advance directives? There are many types of advance directives, and each state has rules about how to use them   You may choose a combination of any of the following:  · Living will: This is a written record of the treatment you want  You can also choose which treatments you do not want, which to limit, and which to stop at a certain time  This includes surgery, medicine, IV fluid, and tube feedings  · Durable power of  for healthcare [facility]): This is a written record that states who you want to make healthcare choices for you when you are unable to make them for yourself  This person, called a proxy, is usually a family member or a friend  You may choose more than 1 proxy  · Do not resuscitate (DNR) order:  A DNR order is used in case your heart stops beating or you stop breathing  It is a request not to have certain forms of treatment, such as CPR  A DNR order may be included in other types of advance directives  · Medical directive: This covers the care that you want if you are in a coma, near death, or unable to make decisions for yourself  You can list the treatments you want for each condition  Treatment may include pain medicine, surgery, blood transfusions, dialysis, IV or tube feedings, and a ventilator (breathing machine)  · Values history: This document has questions about your views, beliefs, and how you feel and think about life  This information can help others choose the care that you would choose  Why are advance directives important? An advance directive helps you control your care  Although spoken wishes may be used, it is better to have your wishes written down  Spoken wishes can be misunderstood, or not followed  Treatments may be given even if you do not want them  An advance directive may make it easier for your family to make difficult choices about your care  Weight Management   Why it is important to manage your weight:  Being overweight increases your risk of health conditions such as heart disease, high blood pressure, type 2 diabetes, and certain types of cancer   It can also increase your risk for osteoarthritis, sleep apnea, and other respiratory problems  Aim for a slow, steady weight loss  Even a small amount of weight loss can lower your risk of health problems  How to lose weight safely:  A safe and healthy way to lose weight is to eat fewer calories and get regular exercise  You can lose up about 1 pound a week by decreasing the number of calories you eat by 500 calories each day  Healthy meal plan for weight management:  A healthy meal plan includes a variety of foods, contains fewer calories, and helps you stay healthy  A healthy meal plan includes the following:  · Eat whole-grain foods more often  A healthy meal plan should contain fiber  Fiber is the part of grains, fruits, and vegetables that is not broken down by your body  Whole-grain foods are healthy and provide extra fiber in your diet  Some examples of whole-grain foods are whole-wheat breads and pastas, oatmeal, brown rice, and bulgur  · Eat a variety of vegetables every day  Include dark, leafy greens such as spinach, kale, camille greens, and mustard greens  Eat yellow and orange vegetables such as carrots, sweet potatoes, and winter squash  · Eat a variety of fruits every day  Choose fresh or canned fruit (canned in its own juice or light syrup) instead of juice  Fruit juice has very little or no fiber  · Eat low-fat dairy foods  Drink fat-free (skim) milk or 1% milk  Eat fat-free yogurt and low-fat cottage cheese  Try low-fat cheeses such as mozzarella and other reduced-fat cheeses  · Choose meat and other protein foods that are low in fat  Choose beans or other legumes such as split peas or lentils  Choose fish, skinless poultry (chicken or turkey), or lean cuts of red meat (beef or pork)  Before you cook meat or poultry, cut off any visible fat  · Use less fat and oil  Try baking foods instead of frying them   Add less fat, such as margarine, sour cream, regular salad dressing and mayonnaise to foods  Eat fewer high-fat foods  Some examples of high-fat foods include french fries, doughnuts, ice cream, and cakes  · Eat fewer sweets  Limit foods and drinks that are high in sugar  This includes candy, cookies, regular soda, and sweetened drinks  Exercise:  Exercise at least 30 minutes per day on most days of the week  Some examples of exercise include walking, biking, dancing, and swimming  You can also fit in more physical activity by taking the stairs instead of the elevator or parking farther away from stores  Ask your healthcare provider about the best exercise plan for you  © Copyright [employer] 2018 Information is for End User's use only and may not be sold, redistributed or otherwise used for commercial purposes   All illustrations and images included in CareNotes® are the copyrighted property of A D A M , Inc  or [address] [place], [place] [address]paWestern [facility]

## 2022-01-13 ENCOUNTER — OFFICE VISIT (OUTPATIENT)
Dept: INTERNAL MEDICINE CLINIC | Facility: CLINIC | Age: 72
End: 2022-01-13
Payer: MEDICARE

## 2022-01-13 VITALS
SYSTOLIC BLOOD PRESSURE: 128 MMHG | HEART RATE: 90 BPM | DIASTOLIC BLOOD PRESSURE: 82 MMHG | BODY MASS INDEX: 35.61 KG/M2 | OXYGEN SATURATION: 97 % | HEIGHT: 64 IN | WEIGHT: 208.6 LBS

## 2022-01-13 DIAGNOSIS — R42 LIGHTHEADED: Primary | ICD-10-CM

## 2022-01-13 PROCEDURE — 99213 OFFICE O/P EST LOW 20 MIN: CPT | Performed by: NURSE PRACTITIONER

## 2022-01-13 RX ORDER — MULTIVIT-MIN/IRON/FOLIC ACID/K 18-600-40
1 CAPSULE ORAL DAILY
COMMUNITY

## 2022-01-13 NOTE — PROGRESS NOTES
Assessment/Plan:    Lightheaded  Most likely vasovagal presyncope  ecg done in office normal and she has no cardiac history  If this happens again go to ER and see cardiologist for full evaluation       Diagnoses and all orders for this visit:    Lightheaded  -     POCT ECG    Other orders  -     Calcium Carb-Cholecalciferol (CALCIUM 1000 + D PO); Take 1 tablet by mouth daily  -     Vitamin D, Cholecalciferol, 50 MCG (2000 UT) CAPS; Take 1 tablet by mouth daily  -     Multiple Vitamin (MULTIVITAMIN ADULT PO); Take 1 tablet by mouth daily  -     Lysine 500 MG CAPS; Take 1 capsule by mouth daily  -     Potassium 99 MG TABS; Take 1,000 mg by mouth daily          Subjective:      Patient ID: Mike Rosas is a 73 y o  female  Patient is here for lightheadedness around 9am, saw "stars" and almost fainted while driving   Acid taste in her mouth  No sob, cp, sweating, cough  No cardiac history  No weakness, slurred speech or weakness of extremities      The following portions of the patient's history were reviewed and updated as appropriate: allergies, current medications, past family history, past medical history, past social history, past surgical history and problem list     Review of Systems   Constitutional: Negative  HENT: Negative  Eyes: Negative  Respiratory: Negative  Cardiovascular: Negative  Gastrointestinal: Negative  Musculoskeletal: Negative  Neurological: Positive for light-headedness  Objective:      /82   Pulse 90   Ht 5' 4" (1 626 m)   Wt 94 6 kg (208 lb 9 6 oz)   SpO2 97%   BMI 35 81 kg/m²          Physical Exam  Vitals and nursing note reviewed  Constitutional:       Appearance: She is well-developed  HENT:      Head: Normocephalic and atraumatic  Right Ear: External ear normal       Left Ear: External ear normal       Nose: Nose normal    Eyes:      Conjunctiva/sclera: Conjunctivae normal       Pupils: Pupils are equal, round, and reactive to light  Cardiovascular:      Rate and Rhythm: Normal rate and regular rhythm  Pulmonary:      Effort: Pulmonary effort is normal       Breath sounds: Normal breath sounds  Abdominal:      General: Bowel sounds are normal       Palpations: Abdomen is soft  Musculoskeletal:         General: Normal range of motion  Cervical back: Normal range of motion and neck supple  Skin:     General: Skin is warm and dry  Neurological:      Mental Status: She is alert and oriented to person, place, and time

## 2022-01-13 NOTE — PATIENT INSTRUCTIONS
Vasovagal syncope ([id number]) occurs when you faint because your body overreacts to certain triggers, such as the sight of blood or extreme emotional distress  It may also be called neurocardiogenic syncope  The vasovagal syncope trigger causes your heart rate and blood pressure to drop suddenly  That leads to reduced blood flow to your brain, causing you to briefly lose consciousness  Vasovagal syncope is usually harmless and requires no treatment  But it's possible that you may injure yourself during a vasovagal syncope episode  Your doctor may recommend tests to rule out more-serious causes of fainting, such as heart disorders  Symptoms  Before you faint due to vasovagal syncope, you may experience some of the following:    Pale skin  Lightheadedness  Tunnel vision -- your field of vision narrows so that you see only what's in front of you  Nausea  Feeling warm  A cold, clammy sweat  Blurred vision  During a vasovagal syncope episode, bystanders may notice:    Jerky, abnormal movements  A slow, weak pulse  Dilated pupils  Recovery after a vasovagal episode generally begins in less than a minute  However, if you stand up too soon after fainting -- within about 15 to 30 minutes -- you're at risk of fainting again  Causes  Vasovagal syncope occurs when the part of your nervous system that regulates heart rate and blood pressure malfunctions in response to a trigger, such as the sight of blood  Your heart rate slows, and the blood vessels in your legs widen (dilate)  This allows blood to pool in your legs, which lowers your blood pressure  Combined, the drop in blood pressure and slowed heart rate quickly reduce blood flow to your brain, and you faint      Sometimes there is no classical vasovagal syncope trigger, but common triggers include:    Standing for long periods of time  Heat exposure  Seeing blood  Having blood drawn  Fear of bodily injury  Straining, such as to have a bowel movement

## 2022-01-17 PROBLEM — R42 LIGHTHEADED: Status: ACTIVE | Noted: 2022-01-17

## 2022-01-17 PROCEDURE — 93000 ELECTROCARDIOGRAM COMPLETE: CPT | Performed by: NURSE PRACTITIONER

## 2022-01-17 NOTE — ASSESSMENT & PLAN NOTE
Most likely vasovagal presyncope  ecg done in office normal and she has no cardiac history  If this happens again go to ER and see cardiologist for full evaluation

## 2022-03-28 ENCOUNTER — NEW PATIENT (OUTPATIENT)
Dept: URBAN - METROPOLITAN AREA CLINIC 6 | Facility: CLINIC | Age: 72
End: 2022-03-28

## 2022-03-28 DIAGNOSIS — H18.453: ICD-10-CM

## 2022-03-28 DIAGNOSIS — H35.363: ICD-10-CM

## 2022-03-28 DIAGNOSIS — H25.813: ICD-10-CM

## 2022-03-28 PROCEDURE — 99204 OFFICE O/P NEW MOD 45 MIN: CPT

## 2022-03-28 ASSESSMENT — TONOMETRY
OS_IOP_MMHG: 11
OD_IOP_MMHG: 10

## 2022-03-28 ASSESSMENT — VISUAL ACUITY
OD_CC: 20/70-1
OD_GLARE: 20/80
OS_PH: 20/30-2
OS_CC: 20/40-1
OS_GLARE: 20/80
OD_PH: 20/60
OU_CC: J1

## 2022-04-25 ENCOUNTER — PRE-OP CATARACT MEASUREMENTS (OUTPATIENT)
Dept: URBAN - METROPOLITAN AREA CLINIC 6 | Facility: CLINIC | Age: 72
End: 2022-04-25

## 2022-04-25 DIAGNOSIS — H25.813: ICD-10-CM

## 2022-04-25 PROCEDURE — 92136 OPHTHALMIC BIOMETRY: CPT

## 2022-04-25 PROCEDURE — 92012 INTRM OPH EXAM EST PATIENT: CPT

## 2022-04-25 ASSESSMENT — VISUAL ACUITY
OS_CC: 20/50-1
OU_SC: J2
OD_GLARE: 20/80
OS_GLARE: 20/80
OD_CC: 20/80-1
OD_PH: 20/60-1

## 2022-04-25 ASSESSMENT — KERATOMETRY
OD_AXISANGLE2_DEGREES: 168
OS_AXISANGLE_DEGREES: 175
OD_AXISANGLE_DEGREES: 78
OD_K2POWER_DIOPTERS: 42.50
OS_AXISANGLE2_DEGREES: 85
OS_K1POWER_DIOPTERS: 42.00
OD_K1POWER_DIOPTERS: 41.50
OS_K2POWER_DIOPTERS: 43.50

## 2022-04-25 ASSESSMENT — TONOMETRY
OD_IOP_MMHG: 13
OS_IOP_MMHG: 12

## 2022-04-28 ENCOUNTER — CONSULT (OUTPATIENT)
Dept: INTERNAL MEDICINE CLINIC | Facility: CLINIC | Age: 72
End: 2022-04-28
Payer: MEDICARE

## 2022-04-28 VITALS
HEART RATE: 77 BPM | WEIGHT: 198.2 LBS | HEIGHT: 64 IN | BODY MASS INDEX: 33.84 KG/M2 | SYSTOLIC BLOOD PRESSURE: 130 MMHG | OXYGEN SATURATION: 97 % | RESPIRATION RATE: 16 BRPM | DIASTOLIC BLOOD PRESSURE: 80 MMHG | TEMPERATURE: 97.7 F

## 2022-04-28 DIAGNOSIS — Z00.00 LABORATORY EXAM ORDERED AS PART OF ROUTINE GENERAL MEDICAL EXAMINATION: ICD-10-CM

## 2022-04-28 DIAGNOSIS — E78.2 MIXED HYPERLIPIDEMIA: ICD-10-CM

## 2022-04-28 DIAGNOSIS — Z01.818 PREOP EXAM FOR INTERNAL MEDICINE: ICD-10-CM

## 2022-04-28 DIAGNOSIS — H25.9 AGE-RELATED CATARACT OF BOTH EYES, UNSPECIFIED AGE-RELATED CATARACT TYPE: Primary | ICD-10-CM

## 2022-04-28 PROBLEM — R42 LIGHTHEADED: Status: RESOLVED | Noted: 2022-01-17 | Resolved: 2022-04-28

## 2022-04-28 PROBLEM — R00.1 BRADYCARDIA: Status: RESOLVED | Noted: 2020-04-03 | Resolved: 2022-04-28

## 2022-04-28 PROCEDURE — 99214 OFFICE O/P EST MOD 30 MIN: CPT | Performed by: INTERNAL MEDICINE

## 2022-04-28 RX ORDER — VITAMIN E 268 MG
400 CAPSULE ORAL DAILY
COMMUNITY

## 2022-04-28 RX ORDER — GENTAMICIN SULFATE 3 MG/ML
SOLUTION/ DROPS OPHTHALMIC
COMMUNITY
Start: 2022-04-25 | End: 2022-08-01 | Stop reason: ALTCHOICE

## 2022-04-28 RX ORDER — KETOROLAC TROMETHAMINE 5 MG/ML
SOLUTION OPHTHALMIC
COMMUNITY
Start: 2022-04-25 | End: 2022-08-01 | Stop reason: ALTCHOICE

## 2022-04-28 NOTE — PROGRESS NOTES
Assessment/Plan:  Medically stable for planned cataract surgeries       Problem List Items Addressed This Visit     None      Visit Diagnoses     Age-related cataract of both eyes, unspecified age-related cataract type    -  Primary    Relevant Medications    gentamicin (GARAMYCIN) 0 3 % ophthalmic solution    ketorolac (ACULAR) 0 5 % ophthalmic solution    Preop exam for internal medicine        Mixed hyperlipidemia        Relevant Orders    Comprehensive metabolic panel    Lipid Panel with Direct LDL reflex    Laboratory exam ordered as part of routine general medical examination        Relevant Orders    CBC and differential    Comprehensive metabolic panel            Subjective:      Patient ID: Tia Light is a 73 y o  female  HPI  Scheduled for cataract surgery 5/10 OD then 5/22 OS Dr Reyna Larios well overall  Due for labs  EKG performed in January was normal  She had a presyncopal episode while driving the lasted a few mins  The following portions of the patient's history were reviewed and updated as appropriate: allergies, current medications, past family history, past medical history, past social history, past surgical history and problem list     Review of Systems   Constitutional: Negative for chills, fatigue, fever and unexpected weight change  HENT: Negative for congestion  Eyes: Positive for visual disturbance  Respiratory: Negative for cough, shortness of breath and wheezing  Cardiovascular: Negative for chest pain, palpitations and leg swelling  Gastrointestinal: Negative for abdominal pain, constipation, diarrhea, nausea and vomiting  Genitourinary: Negative for difficulty urinating  Neurological: Negative for dizziness and headaches           Objective:      /80   Pulse 77   Temp 97 7 °F (36 5 °C)   Resp 16   Ht 5' 4" (1 626 m)   Wt 89 9 kg (198 lb 3 2 oz)   SpO2 97%   BMI 34 02 kg/m²          Physical Exam  Constitutional:       General: She is not in acute distress  Appearance: She is well-developed  She is obese  She is not ill-appearing, toxic-appearing or diaphoretic  HENT:      Head: Normocephalic and atraumatic  Eyes:      Conjunctiva/sclera: Conjunctivae normal    Cardiovascular:      Rate and Rhythm: Normal rate and regular rhythm  Heart sounds: Normal heart sounds  No murmur heard  Pulmonary:      Effort: Pulmonary effort is normal  No respiratory distress  Breath sounds: Normal breath sounds  No wheezing or rales  Abdominal:      General: There is no distension  Palpations: Abdomen is soft  There is no mass  Tenderness: There is no abdominal tenderness  There is no guarding or rebound  Musculoskeletal:      Cervical back: Neck supple  Right lower leg: No edema  Left lower leg: No edema  Skin:     General: Skin is warm and dry  Neurological:      Mental Status: She is alert and oriented to person, place, and time  Psychiatric:         Mood and Affect: Mood normal          Behavior: Behavior normal          Thought Content:  Thought content normal          Judgment: Judgment normal

## 2022-05-10 ENCOUNTER — SURGERY/PROCEDURE (OUTPATIENT)
Dept: URBAN - METROPOLITAN AREA SURGICAL CENTER 6 | Facility: SURGICAL CENTER | Age: 72
End: 2022-05-10

## 2022-05-10 DIAGNOSIS — H25.811: ICD-10-CM

## 2022-05-10 PROCEDURE — 66984 XCAPSL CTRC RMVL W/O ECP: CPT

## 2022-05-10 PROCEDURE — 68841 INSJ RX ELUT IMPLT LAC CANAL: CPT

## 2022-05-11 ENCOUNTER — 1 DAY POST-OP (OUTPATIENT)
Dept: URBAN - METROPOLITAN AREA CLINIC 6 | Facility: CLINIC | Age: 72
End: 2022-05-11

## 2022-05-11 DIAGNOSIS — Z96.1: ICD-10-CM

## 2022-05-11 PROCEDURE — 99024 POSTOP FOLLOW-UP VISIT: CPT

## 2022-05-11 ASSESSMENT — TONOMETRY
OS_IOP_MMHG: 10
OD_IOP_MMHG: 10

## 2022-05-11 ASSESSMENT — KERATOMETRY
OD_AXISANGLE_DEGREES: 78
OS_K2POWER_DIOPTERS: 43.50
OD_AXISANGLE2_DEGREES: 168
OD_AXISANGLE_DEGREES: 78
OS_AXISANGLE_DEGREES: 175
OS_K1POWER_DIOPTERS: 42.00
OD_K1POWER_DIOPTERS: 41.50
OD_K2POWER_DIOPTERS: 42.50
OD_K2POWER_DIOPTERS: 42.50
OD_K1POWER_DIOPTERS: 41.50
OS_AXISANGLE2_DEGREES: 85
OS_AXISANGLE2_DEGREES: 85
OS_K1POWER_DIOPTERS: 42.00
OD_AXISANGLE2_DEGREES: 168
OS_K2POWER_DIOPTERS: 43.50
OS_AXISANGLE_DEGREES: 175

## 2022-05-11 ASSESSMENT — VISUAL ACUITY
OD_SC: 20/40+1
OD_PH: 20/30-2

## 2022-05-19 ENCOUNTER — 1 WEEK POST-OP (OUTPATIENT)
Dept: URBAN - METROPOLITAN AREA CLINIC 6 | Facility: CLINIC | Age: 72
End: 2022-05-19

## 2022-05-19 DIAGNOSIS — H25.812: ICD-10-CM

## 2022-05-19 DIAGNOSIS — Z96.1: ICD-10-CM

## 2022-05-19 PROCEDURE — 92136 OPHTHALMIC BIOMETRY: CPT | Mod: 26,LT

## 2022-05-19 PROCEDURE — 99024 POSTOP FOLLOW-UP VISIT: CPT

## 2022-05-19 ASSESSMENT — KERATOMETRY
OS_K2POWER_DIOPTERS: 43.50
OD_AXISANGLE2_DEGREES: 168
OD_K1POWER_DIOPTERS: 41.50
OS_AXISANGLE_DEGREES: 175
OS_K1POWER_DIOPTERS: 42.00
OS_AXISANGLE2_DEGREES: 85
OD_K2POWER_DIOPTERS: 42.50
OD_AXISANGLE_DEGREES: 78

## 2022-05-19 ASSESSMENT — VISUAL ACUITY
OD_SC: 20/50+2
OU_SC: J3
OD_PH: 20/40+2
OS_SC: 20/60+1
OS_PH: 20/40+1
OS_GLARE: 20/200

## 2022-05-19 ASSESSMENT — TONOMETRY
OD_IOP_MMHG: 8
OS_IOP_MMHG: 12

## 2022-05-24 ENCOUNTER — SURGERY/PROCEDURE (OUTPATIENT)
Dept: URBAN - METROPOLITAN AREA SURGICAL CENTER 6 | Facility: SURGICAL CENTER | Age: 72
End: 2022-05-24

## 2022-05-24 DIAGNOSIS — H25.812: ICD-10-CM

## 2022-05-24 PROCEDURE — 68841 INSJ RX ELUT IMPLT LAC CANAL: CPT | Mod: 79,LT

## 2022-05-24 PROCEDURE — 66984 XCAPSL CTRC RMVL W/O ECP: CPT | Mod: 79,LT

## 2022-05-25 ENCOUNTER — 1 DAY POST-OP (OUTPATIENT)
Dept: URBAN - METROPOLITAN AREA CLINIC 6 | Facility: CLINIC | Age: 72
End: 2022-05-25

## 2022-05-25 DIAGNOSIS — Z96.1: ICD-10-CM

## 2022-05-25 PROCEDURE — 99024 POSTOP FOLLOW-UP VISIT: CPT

## 2022-05-25 ASSESSMENT — KERATOMETRY
OD_AXISANGLE_DEGREES: 78
OS_K2POWER_DIOPTERS: 43.50
OD_K2POWER_DIOPTERS: 42.50
OS_AXISANGLE2_DEGREES: 85
OD_AXISANGLE2_DEGREES: 168
OS_K1POWER_DIOPTERS: 42.00
OS_AXISANGLE_DEGREES: 175
OD_K1POWER_DIOPTERS: 41.50

## 2022-05-25 ASSESSMENT — VISUAL ACUITY
OS_PH: 20/30
OS_SC: 20/40
OD_SC: 20/50
OU_SC: J16
OD_PH: 20/30

## 2022-05-25 ASSESSMENT — TONOMETRY
OS_IOP_MMHG: 10
OD_IOP_MMHG: 8

## 2022-05-27 ASSESSMENT — KERATOMETRY
OD_AXISANGLE2_DEGREES: 168
OS_AXISANGLE_DEGREES: 175
OD_AXISANGLE_DEGREES: 78
OD_K1POWER_DIOPTERS: 41.50
OS_AXISANGLE2_DEGREES: 85
OD_K2POWER_DIOPTERS: 42.50
OS_K2POWER_DIOPTERS: 43.50
OS_K1POWER_DIOPTERS: 42.00

## 2022-06-01 ENCOUNTER — 1 WEEK POST-OP (OUTPATIENT)
Dept: URBAN - METROPOLITAN AREA CLINIC 6 | Facility: CLINIC | Age: 72
End: 2022-06-01

## 2022-06-01 DIAGNOSIS — Z96.1: ICD-10-CM

## 2022-06-01 PROCEDURE — 99024 POSTOP FOLLOW-UP VISIT: CPT

## 2022-06-01 ASSESSMENT — KERATOMETRY
OD_AXISANGLE2_DEGREES: 168
OS_AXISANGLE2_DEGREES: 85
OS_K1POWER_DIOPTERS: 42.00
OS_K2POWER_DIOPTERS: 43.50
OD_K2POWER_DIOPTERS: 42.50
OD_K1POWER_DIOPTERS: 41.50
OD_AXISANGLE_DEGREES: 78
OS_AXISANGLE_DEGREES: 175

## 2022-06-01 ASSESSMENT — TONOMETRY
OS_IOP_MMHG: 11
OD_IOP_MMHG: 11

## 2022-06-01 ASSESSMENT — VISUAL ACUITY
OD_SC: 20/50
OD_PH: 20/40+2
OS_SC: 20/40+2

## 2022-06-15 ENCOUNTER — RX CHECK (OUTPATIENT)
Dept: URBAN - METROPOLITAN AREA CLINIC 6 | Facility: CLINIC | Age: 72
End: 2022-06-15

## 2022-06-15 DIAGNOSIS — H52.03: ICD-10-CM

## 2022-06-15 PROCEDURE — 92015 DETERMINE REFRACTIVE STATE: CPT

## 2022-06-15 ASSESSMENT — KERATOMETRY
OD_AXISANGLE2_DEGREES: 168
OD_K2POWER_DIOPTERS: 42.50
OS_AXISANGLE2_DEGREES: 85
OD_K1POWER_DIOPTERS: 41.50
OS_K2POWER_DIOPTERS: 43.50
OS_K1POWER_DIOPTERS: 42.00
OD_AXISANGLE_DEGREES: 78
OS_AXISANGLE_DEGREES: 175

## 2022-06-15 ASSESSMENT — VISUAL ACUITY
OD_SC: 20/40
OS_SC: 20/40

## 2022-06-28 DIAGNOSIS — Z12.31 SCREENING MAMMOGRAM FOR BREAST CANCER: Primary | ICD-10-CM

## 2022-07-13 ENCOUNTER — APPOINTMENT (OUTPATIENT)
Dept: LAB | Facility: MEDICAL CENTER | Age: 72
End: 2022-07-13
Payer: MEDICARE

## 2022-07-13 LAB
ALBUMIN SERPL BCP-MCNC: 4 G/DL (ref 3.5–5)
ALP SERPL-CCNC: 61 U/L (ref 46–116)
ALT SERPL W P-5'-P-CCNC: 30 U/L (ref 12–78)
ANION GAP SERPL CALCULATED.3IONS-SCNC: 4 MMOL/L (ref 4–13)
AST SERPL W P-5'-P-CCNC: 18 U/L (ref 5–45)
BASOPHILS # BLD AUTO: 0.03 THOUSANDS/ΜL (ref 0–0.1)
BASOPHILS NFR BLD AUTO: 1 % (ref 0–1)
BILIRUB SERPL-MCNC: 0.48 MG/DL (ref 0.2–1)
BUN SERPL-MCNC: 18 MG/DL (ref 5–25)
CALCIUM SERPL-MCNC: 9.2 MG/DL (ref 8.3–10.1)
CHLORIDE SERPL-SCNC: 107 MMOL/L (ref 100–108)
CHOLEST SERPL-MCNC: 238 MG/DL
CO2 SERPL-SCNC: 28 MMOL/L (ref 21–32)
CREAT SERPL-MCNC: 0.88 MG/DL (ref 0.6–1.3)
EOSINOPHIL # BLD AUTO: 0.1 THOUSAND/ΜL (ref 0–0.61)
EOSINOPHIL NFR BLD AUTO: 2 % (ref 0–6)
ERYTHROCYTE [DISTWIDTH] IN BLOOD BY AUTOMATED COUNT: 13.9 % (ref 11.6–15.1)
GFR SERPL CREATININE-BSD FRML MDRD: 66 ML/MIN/1.73SQ M
GLUCOSE P FAST SERPL-MCNC: 85 MG/DL (ref 65–99)
HCT VFR BLD AUTO: 45.2 % (ref 34.8–46.1)
HDLC SERPL-MCNC: 55 MG/DL
HGB BLD-MCNC: 13.7 G/DL (ref 11.5–15.4)
IMM GRANULOCYTES # BLD AUTO: 0.02 THOUSAND/UL (ref 0–0.2)
IMM GRANULOCYTES NFR BLD AUTO: 0 % (ref 0–2)
LDLC SERPL CALC-MCNC: 159 MG/DL (ref 0–100)
LYMPHOCYTES # BLD AUTO: 2.56 THOUSANDS/ΜL (ref 0.6–4.47)
LYMPHOCYTES NFR BLD AUTO: 43 % (ref 14–44)
MCH RBC QN AUTO: 28.4 PG (ref 26.8–34.3)
MCHC RBC AUTO-ENTMCNC: 30.3 G/DL (ref 31.4–37.4)
MCV RBC AUTO: 94 FL (ref 82–98)
MONOCYTES # BLD AUTO: 0.57 THOUSAND/ΜL (ref 0.17–1.22)
MONOCYTES NFR BLD AUTO: 10 % (ref 4–12)
NEUTROPHILS # BLD AUTO: 2.67 THOUSANDS/ΜL (ref 1.85–7.62)
NEUTS SEG NFR BLD AUTO: 44 % (ref 43–75)
NRBC BLD AUTO-RTO: 0 /100 WBCS
PLATELET # BLD AUTO: 358 THOUSANDS/UL (ref 149–390)
PMV BLD AUTO: 10.2 FL (ref 8.9–12.7)
POTASSIUM SERPL-SCNC: 3.9 MMOL/L (ref 3.5–5.3)
PROT SERPL-MCNC: 7.7 G/DL (ref 6.4–8.2)
RBC # BLD AUTO: 4.82 MILLION/UL (ref 3.81–5.12)
SODIUM SERPL-SCNC: 139 MMOL/L (ref 136–145)
TRIGL SERPL-MCNC: 122 MG/DL
WBC # BLD AUTO: 5.95 THOUSAND/UL (ref 4.31–10.16)

## 2022-07-13 PROCEDURE — 80053 COMPREHEN METABOLIC PANEL: CPT | Performed by: INTERNAL MEDICINE

## 2022-07-13 PROCEDURE — 80061 LIPID PANEL: CPT | Performed by: INTERNAL MEDICINE

## 2022-07-13 PROCEDURE — 36415 COLL VENOUS BLD VENIPUNCTURE: CPT | Performed by: INTERNAL MEDICINE

## 2022-07-13 PROCEDURE — 85025 COMPLETE CBC W/AUTO DIFF WBC: CPT | Performed by: INTERNAL MEDICINE

## 2022-07-14 ENCOUNTER — HOSPITAL ENCOUNTER (OUTPATIENT)
Dept: RADIOLOGY | Facility: MEDICAL CENTER | Age: 72
Discharge: HOME | End: 2022-07-14
Payer: MEDICARE

## 2022-07-14 VITALS — HEIGHT: 64 IN | WEIGHT: 198 LBS | BODY MASS INDEX: 33.8 KG/M2

## 2022-07-14 DIAGNOSIS — Z12.31 ENCOUNTER FOR SCREENING MAMMOGRAM FOR BREAST CANCER: ICD-10-CM

## 2022-07-14 PROCEDURE — 77063 BREAST TOMOSYNTHESIS BI: CPT

## 2022-07-14 PROCEDURE — 77067 SCR MAMMO BI INCL CAD: CPT

## 2022-08-01 ENCOUNTER — OFFICE VISIT (OUTPATIENT)
Dept: INTERNAL MEDICINE CLINIC | Facility: CLINIC | Age: 72
End: 2022-08-01
Payer: MEDICARE

## 2022-08-01 VITALS
DIASTOLIC BLOOD PRESSURE: 84 MMHG | OXYGEN SATURATION: 97 % | SYSTOLIC BLOOD PRESSURE: 124 MMHG | HEART RATE: 69 BPM | HEIGHT: 65 IN | WEIGHT: 195.6 LBS | RESPIRATION RATE: 16 BRPM | BODY MASS INDEX: 32.59 KG/M2

## 2022-08-01 DIAGNOSIS — E78.2 MIXED HYPERLIPIDEMIA: ICD-10-CM

## 2022-08-01 DIAGNOSIS — Z23 NEED FOR VACCINATION: ICD-10-CM

## 2022-08-01 DIAGNOSIS — Z12.11 SCREEN FOR COLON CANCER: ICD-10-CM

## 2022-08-01 DIAGNOSIS — Z12.31 SCREENING MAMMOGRAM FOR BREAST CANCER: ICD-10-CM

## 2022-08-01 DIAGNOSIS — Z00.00 MEDICARE ANNUAL WELLNESS VISIT, SUBSEQUENT: Primary | ICD-10-CM

## 2022-08-01 PROBLEM — E66.811 CLASS 1 OBESITY DUE TO EXCESS CALORIES WITHOUT SERIOUS COMORBIDITY IN ADULT: Status: ACTIVE | Noted: 2022-08-01

## 2022-08-01 PROBLEM — E66.09 CLASS 1 OBESITY DUE TO EXCESS CALORIES WITHOUT SERIOUS COMORBIDITY IN ADULT: Status: ACTIVE | Noted: 2022-08-01

## 2022-08-01 PROCEDURE — G0439 PPPS, SUBSEQ VISIT: HCPCS | Performed by: INTERNAL MEDICINE

## 2022-08-01 PROCEDURE — 90715 TDAP VACCINE 7 YRS/> IM: CPT | Performed by: INTERNAL MEDICINE

## 2022-08-01 PROCEDURE — 90471 IMMUNIZATION ADMIN: CPT | Performed by: INTERNAL MEDICINE

## 2022-08-01 RX ORDER — ROSUVASTATIN CALCIUM 5 MG/1
5 TABLET, COATED ORAL DAILY
Qty: 30 TABLET | Refills: 5 | Status: SHIPPED | OUTPATIENT
Start: 2022-08-01 | End: 2022-08-23

## 2022-08-01 RX ORDER — ASCORBATE CALCIUM 500 MG
500 TABLET ORAL DAILY
COMMUNITY

## 2022-08-01 NOTE — ASSESSMENT & PLAN NOTE
Atorvastatin caused knee pain in the past  She is willing to try another statin  Start rosuvastatin 5mg and check blood work in 4 months

## 2022-08-01 NOTE — PROGRESS NOTES
Assessment and Plan:     Problem List Items Addressed This Visit        Other    Mixed hyperlipidemia     Atorvastatin caused knee pain in the past  She is willing to try another statin  Start rosuvastatin 5mg and check blood work in 4 months         Relevant Medications    rosuvastatin (CRESTOR) 5 mg tablet    Other Relevant Orders    CBC and differential    Comprehensive metabolic panel    Lipid Panel with Direct LDL reflex    Hepatic function panel    Lipid Panel with Direct LDL reflex      Other Visit Diagnoses     Medicare annual wellness visit, subsequent    -  Primary    Screening mammogram for breast cancer        Relevant Orders    Mammo screening bilateral w 3d & cad    Need for vaccination        Relevant Orders    TDAP VACCINE GREATER THAN OR EQUAL TO 6YO IM    Screen for colon cancer        Relevant Orders    Ambulatory referral for colonoscopy        BMI Counseling: Body mass index is 32 55 kg/m²  The BMI is above normal  Nutrition recommendations include decreasing portion sizes, encouraging healthy choices of fruits and vegetables, moderation in carbohydrate intake and reducing intake of saturated and trans fat  Exercise recommendations include exercising 3-5 times per week  Rationale for BMI follow-up plan is due to patient being overweight or obese  Depression Screening and Follow-up Plan: Patient was screened for depression during today's encounter  They screened negative with a PHQ-2 score of 0  Preventive health issues were discussed with patient, and age appropriate screening tests were ordered as noted in patient's After Visit Summary  Personalized health advice and appropriate referrals for health education or preventive services given if needed, as noted in patient's After Visit Summary       History of Present Illness:     Patient presents for a Medicare Wellness Visit    HPI   Patient Care Team:  Jose Armando Gibson MD as PCP - General (Internal Medicine)     Review of Systems: Review of Systems   Constitutional: Negative for chills, fever and unexpected weight change  HENT: Negative for congestion  Respiratory: Negative for shortness of breath  Cardiovascular: Negative for chest pain and palpitations  Gastrointestinal: Negative for abdominal pain, blood in stool, constipation and diarrhea  Genitourinary: Negative for difficulty urinating, hematuria and vaginal bleeding  Musculoskeletal: Negative for arthralgias  Neurological: Negative for dizziness and headaches  Problem List:     Patient Active Problem List   Diagnosis    Mixed hyperlipidemia    Class 1 obesity due to excess calories without serious comorbidity in adult      Past Medical and Surgical History:     Past Medical History:   Diagnosis Date    Bradycardia 4/3/2020    Lightheaded 1/17/2022    Vertigo      History reviewed  No pertinent surgical history  Family History:     Family History   Problem Relation Age of Onset    Dementia Mother     Heart attack Father     No Known Problems Brother     No Known Problems Brother     No Known Problems Son     Hyperlipidemia Son     No Known Problems Son     No Known Problems Daughter     No Known Problems Maternal Grandmother     No Known Problems Maternal Grandfather     No Known Problems Paternal Grandmother     No Known Problems Paternal Grandfather     Brain cancer Maternal Aunt     Coronary artery disease Maternal Uncle       Social History:     Social History     Socioeconomic History    Marital status:       Spouse name: None    Number of children: None    Years of education: None    Highest education level: None   Occupational History    None   Tobacco Use    Smoking status: Never Smoker    Smokeless tobacco: Never Used   Vaping Use    Vaping Use: Never used   Substance and Sexual Activity    Alcohol use: Yes     Comment: 1 per month    Drug use: Never    Sexual activity: Not Currently   Other Topics Concern    None   Social History Narrative    None     Social Determinants of Health     Financial Resource Strain: Not on file   Food Insecurity: Not on file   Transportation Needs: Not on file   Physical Activity: Not on file   Stress: Not on file   Social Connections: Not on file   Intimate Partner Violence: Not on file   Housing Stability: Not on file      Medications and Allergies:     Current Outpatient Medications   Medication Sig Dispense Refill    Calcium Ascorbate (VITAMIN C) 500 mg tablet Take 500 mg by mouth daily      Calcium Carb-Cholecalciferol (CALCIUM 1000 + D PO) Take 1 tablet by mouth daily      Lysine 500 MG CAPS Take 1 capsule by mouth daily      Multiple Vitamin (MULTIVITAMIN ADULT PO) Take 1 tablet by mouth daily      rosuvastatin (CRESTOR) 5 mg tablet Take 1 tablet (5 mg total) by mouth daily 30 tablet 5    Vitamin D, Cholecalciferol, 50 MCG (2000 UT) CAPS Take 1 tablet by mouth daily      vitamin E, tocopherol, 400 units capsule Take 400 Units by mouth daily       No current facility-administered medications for this visit  Allergies   Allergen Reactions    Atorvastatin Arthralgia      Immunizations:     Immunization History   Administered Date(s) Administered    COVID-19 PFIZER VACCINE 0 3 ML IM 03/18/2021, 04/11/2021, 10/28/2021    COVID-19 Pfizer vac (Sim-sucrose, gray cap) 12 yr+ IM 04/12/2022    INFLUENZA 10/04/2017, 09/05/2020, 10/14/2021    Pneumococcal Conjugate 13-Valent 07/24/2020    Pneumococcal Polysaccharide PPV23 07/27/2021    Zoster Vaccine Recombinant 09/07/2021, 12/07/2021      Health Maintenance:         Topic Date Due    Colorectal Cancer Screening  Never done    Breast Cancer Screening: Mammogram  07/14/2023    Hepatitis C Screening  Completed         Topic Date Due    Influenza Vaccine (1) 09/01/2022      Medicare Screening Tests and Risk Assessments:     Tl Weiss is here for her Subsequent Wellness visit       Health Risk Assessment:   Patient rates overall health as very good  Patient feels that their physical health rating is slightly better  Patient is very satisfied with their life  Eyesight was rated as same  Hearing was rated as same  Patient feels that their emotional and mental health rating is same  Patients states they are never, rarely angry  Patient states they are never, rarely unusually tired/fatigued  Pain experienced in the last 7 days has been none  Patient states that she has experienced no weight loss or gain in last 6 months  Depression Screening:   PHQ-2 Score: 0      Fall Risk Screening: In the past year, patient has experienced: no history of falling in past year      Urinary Incontinence Screening:   Patient has not leaked urine accidently in the last six months  Home Safety:  Patient does not have trouble with stairs inside or outside of their home  Patient has working smoke alarms and has working carbon monoxide detector  Home safety hazards include: none  Nutrition:   Current diet is Low Cholesterol and Low Carb  Medications:   Patient is not currently taking any over-the-counter supplements  Patient is able to manage medications  Activities of Daily Living (ADLs)/Instrumental Activities of Daily Living (IADLs):   Walk and transfer into and out of bed and chair?: Yes  Dress and groom yourself?: Yes    Bathe or shower yourself?: Yes    Feed yourself? Yes  Do your laundry/housekeeping?: Yes  Manage your money, pay your bills and track your expenses?: Yes  Make your own meals?: Yes    Do your own shopping?: Yes    Previous Hospitalizations:   Any hospitalizations or ED visits within the last 12 months?: No      Advance Care Planning:   Living will: Yes    Durable POA for healthcare:  Yes    Advanced directive: Yes      Cognitive Screening:   Provider or family/friend/caregiver concerned regarding cognition?: No    PREVENTIVE SCREENINGS      Cardiovascular Screening:    General: Screening Not Indicated and History Lipid Disorder      Diabetes Screening:     General: Screening Current      Colorectal Cancer Screening:     General: Risks and Benefits Discussed    Due for: Colonoscopy - Low Risk      Breast Cancer Screening:     General: Screening Current      Cervical Cancer Screening:    General: Screening Not Indicated      Osteoporosis Screening:    General: Screening Current      Abdominal Aortic Aneurysm (AAA) Screening:        General: Screening Not Indicated      Lung Cancer Screening:     General: Screening Not Indicated      Hepatitis C Screening:    General: Screening Current    Screening, Brief Intervention, and Referral to Treatment (SBIRT)    Screening  Typical number of drinks in a day: 0  Typical number of drinks in a week: 0  Interpretation: Low risk drinking behavior  AUDIT-C Screenin) How often did you have a drink containing alcohol in the past year? never  2) How many drinks did you have on a typical day when you were drinking in the past year? 0  3) How often did you have 6 or more drinks on one occasion in the past year? never    AUDIT-C Score: 0  Interpretation: Score 0-2 (female): Negative screen for alcohol misuse    Single Item Drug Screening:  How often have you used an illegal drug (including marijuana) or a prescription medication for non-medical reasons in the past year? never    Single Item Drug Screen Score: 0  Interpretation: Negative screen for possible drug use disorder    No exam data present     Physical Exam:     /84   Pulse 69   Resp 16   Ht 5' 5" (1 651 m)   Wt 88 7 kg (195 lb 9 6 oz)   SpO2 97%   BMI 32 55 kg/m²     Physical Exam  Constitutional:       General: She is not in acute distress  Appearance: She is well-developed  She is obese  She is not ill-appearing, toxic-appearing or diaphoretic  HENT:      Head: Normocephalic and atraumatic  Right Ear: External ear normal  There is no impacted cerumen        Left Ear: External ear normal  There is no impacted cerumen  Eyes:      Conjunctiva/sclera: Conjunctivae normal    Cardiovascular:      Rate and Rhythm: Normal rate and regular rhythm  Heart sounds: Normal heart sounds  No murmur heard  Pulmonary:      Effort: Pulmonary effort is normal  No respiratory distress  Breath sounds: Normal breath sounds  No stridor  No wheezing or rales  Abdominal:      General: There is no distension  Palpations: Abdomen is soft  There is no mass  Tenderness: There is no abdominal tenderness  There is no guarding or rebound  Musculoskeletal:      Cervical back: Neck supple  Right lower leg: No edema  Left lower leg: No edema  Skin:     General: Skin is warm and dry  Neurological:      Mental Status: She is alert and oriented to person, place, and time  Psychiatric:         Mood and Affect: Mood normal          Behavior: Behavior normal          Thought Content:  Thought content normal          Judgment: Judgment normal           Reinier Donaldson MD

## 2022-08-01 NOTE — PATIENT INSTRUCTIONS
Medicare Preventive Visit Patient Instructions  Thank you for completing your Welcome to Medicare Visit or Medicare Annual Wellness Visit today  Your next wellness visit will be due in one year (8/2/2023)  The screening/preventive services that you may require over the next 5-10 years are detailed below  Some tests may not apply to you based off risk factors and/or age  Screening tests ordered at today's visit but not completed yet may show as past due  Also, please note that scanned in results may not display below  Preventive Screenings:  Service Recommendations Previous Testing/Comments   Colorectal Cancer Screening  * Colonoscopy    * Fecal Occult Blood Test (FOBT)/Fecal Immunochemical Test (FIT)  * Fecal DNA/Cologuard Test  * Flexible Sigmoidoscopy Age: 54-65 years old   Colonoscopy: every 10 years (may be performed more frequently if at higher risk)  OR  FOBT/FIT: every 1 year  OR  Cologuard: every 3 years  OR  Sigmoidoscopy: every 5 years  Screening may be recommended earlier than age 48 if at higher risk for colorectal cancer  Also, an individualized decision between you and your healthcare provider will decide whether screening between the ages of 74-80 would be appropriate  Colonoscopy: Not on file  FOBT/FIT: Not on file  Cologuard: Not on file  Sigmoidoscopy: Not on file          Breast Cancer Screening Age: 36 years old  Frequency: every 1-2 years  Not required if history of left and right mastectomy Mammogram: 07/14/2022        Cervical Cancer Screening Between the ages of 21-29, pap smear recommended once every 3 years  Between the ages of 33-67, can perform pap smear with HPV co-testing every 5 years     Recommendations may differ for women with a history of total hysterectomy, cervical cancer, or abnormal pap smears in past  Pap Smear: Not on file        Hepatitis C Screening Once for adults born between [facility]  More frequently in patients at high risk for Hepatitis C Hep C Antibody: 07/27/2020        Diabetes Screening 1-2 times per year if you're at risk for diabetes or have pre-diabetes Fasting glucose: 85 mg/dL   A1C: No results in last 5 years        Cholesterol Screening Once every 5 years if you don't have a lipid disorder  May order more often based on risk factors  Lipid panel: 07/13/2022          Other Preventive Screenings Covered by Medicare:  1  Abdominal Aortic Aneurysm (AAA) Screening: covered once if your at risk  You're considered to be at risk if you have a family history of AAA  2  Lung Cancer Screening: covers low dose CT scan once per year if you meet all of the following conditions: (1) Age 50-69; (2) No signs or symptoms of lung cancer; (3) Current smoker or have quit smoking within the last 15 years; (4) You have a tobacco smoking history of at least 30 pack years (packs per day multiplied by number of years you smoked); (5) You get a written order from a healthcare provider  3  Glaucoma Screening: covered annually if you're considered high risk: (1) You have diabetes OR (2) Family history of glaucoma OR (3)  aged 48 and older OR (3)  American aged 72 and older  3  Osteoporosis Screening: covered every 2 years if you meet one of the following conditions: (1) You're estrogen deficient and at risk for osteoporosis based off medical history and other findings; (2) Have a vertebral abnormality; (3) On glucocorticoid therapy for more than 3 months; (4) Have primary hyperparathyroidism; (5) On osteoporosis medications and need to assess response to drug therapy  · Last bone density test (DXA Scan): 05/04/2021   5  HIV Screening: covered annually if you're between the age of 15-65  Also covered annually if you are younger than 13 and older than 72 with risk factors for HIV infection  For pregnant patients, it is covered up to 3 times per pregnancy      Immunizations:  Immunization Recommendations   Influenza Vaccine Annual influenza vaccination during flu season is recommended for all persons aged >= 6 months who do not have contraindications   Pneumococcal Vaccine (Prevnar and Pneumovax)  * Prevnar = PCV13  * Pneumovax = PPSV23   Adults 25-60 years old: 1-3 doses may be recommended based on certain risk factors  Adults 72 years old: Prevnar (PCV13) vaccine recommended followed by Pneumovax (PPSV23) vaccine  If already received PPSV23 since turning 65, then PCV13 recommended at least one year after PPSV23 dose  Hepatitis B Vaccine 3 dose series if at intermediate or high risk (ex: diabetes, end stage renal disease, liver disease)   Tetanus (Td) Vaccine - COST NOT COVERED BY MEDICARE PART B Following completion of primary series, a booster dose should be given every 10 years to maintain immunity against tetanus  Td may also be given as tetanus wound prophylaxis  Tdap Vaccine - COST NOT COVERED BY MEDICARE PART B Recommended at least once for all adults  For pregnant patients, recommended with each pregnancy  Shingles Vaccine (Shingrix) - COST NOT COVERED BY MEDICARE PART B  2 shot series recommended in those aged 48 and above     Health Maintenance Due:      Topic Date Due    Colorectal Cancer Screening  Never done    Breast Cancer Screening: Mammogram  07/14/2023    Hepatitis C Screening  Completed     Immunizations Due:      Topic Date Due    Influenza Vaccine (1) 09/01/2022     Advance Directives   What are advance directives? Advance directives are legal documents that state your wishes and plans for medical care  These plans are made ahead of time in case you lose your ability to make decisions for yourself  Advance directives can apply to any medical decision, such as the treatments you want, and if you want to donate organs  What are the types of advance directives? There are many types of advance directives, and each state has rules about how to use them  You may choose a combination of any of the following:  · Living will:   This is a written record of the treatment you want  You can also choose which treatments you do not want, which to limit, and which to stop at a certain time  This includes surgery, medicine, IV fluid, and tube feedings  · Durable power of  for healthcare [facility]): This is a written record that states who you want to make healthcare choices for you when you are unable to make them for yourself  This person, called a proxy, is usually a family member or a friend  You may choose more than 1 proxy  · Do not resuscitate (DNR) order:  A DNR order is used in case your heart stops beating or you stop breathing  It is a request not to have certain forms of treatment, such as CPR  A DNR order may be included in other types of advance directives  · Medical directive: This covers the care that you want if you are in a coma, near death, or unable to make decisions for yourself  You can list the treatments you want for each condition  Treatment may include pain medicine, surgery, blood transfusions, dialysis, IV or tube feedings, and a ventilator (breathing machine)  · Values history: This document has questions about your views, beliefs, and how you feel and think about life  This information can help others choose the care that you would choose  Why are advance directives important? An advance directive helps you control your care  Although spoken wishes may be used, it is better to have your wishes written down  Spoken wishes can be misunderstood, or not followed  Treatments may be given even if you do not want them  An advance directive may make it easier for your family to make difficult choices about your care  Weight Management   Why it is important to manage your weight:  Being overweight increases your risk of health conditions such as heart disease, high blood pressure, type 2 diabetes, and certain types of cancer  It can also increase your risk for osteoarthritis, sleep apnea, and other respiratory problems  Aim for a slow, steady weight loss  Even a small amount of weight loss can lower your risk of health problems  How to lose weight safely:  A safe and healthy way to lose weight is to eat fewer calories and get regular exercise  You can lose up about 1 pound a week by decreasing the number of calories you eat by 500 calories each day  Healthy meal plan for weight management:  A healthy meal plan includes a variety of foods, contains fewer calories, and helps you stay healthy  A healthy meal plan includes the following:  · Eat whole-grain foods more often  A healthy meal plan should contain fiber  Fiber is the part of grains, fruits, and vegetables that is not broken down by your body  Whole-grain foods are healthy and provide extra fiber in your diet  Some examples of whole-grain foods are whole-wheat breads and pastas, oatmeal, brown rice, and bulgur  · Eat a variety of vegetables every day  Include dark, leafy greens such as spinach, kale, camille greens, and mustard greens  Eat yellow and orange vegetables such as carrots, sweet potatoes, and winter squash  · Eat a variety of fruits every day  Choose fresh or canned fruit (canned in its own juice or light syrup) instead of juice  Fruit juice has very little or no fiber  · Eat low-fat dairy foods  Drink fat-free (skim) milk or 1% milk  Eat fat-free yogurt and low-fat cottage cheese  Try low-fat cheeses such as mozzarella and other reduced-fat cheeses  · Choose meat and other protein foods that are low in fat  Choose beans or other legumes such as split peas or lentils  Choose fish, skinless poultry (chicken or turkey), or lean cuts of red meat (beef or pork)  Before you cook meat or poultry, cut off any visible fat  · Use less fat and oil  Try baking foods instead of frying them  Add less fat, such as margarine, sour cream, regular salad dressing and mayonnaise to foods  Eat fewer high-fat foods   Some examples of high-fat foods include french fries, doughnuts, ice cream, and cakes  · Eat fewer sweets  Limit foods and drinks that are high in sugar  This includes candy, cookies, regular soda, and sweetened drinks  Exercise:  Exercise at least 30 minutes per day on most days of the week  Some examples of exercise include walking, biking, dancing, and swimming  You can also fit in more physical activity by taking the stairs instead of the elevator or parking farther away from stores  Ask your healthcare provider about the best exercise plan for you  © Copyright [employer] 2018 Information is for End User's use only and may not be sold, redistributed or otherwise used for commercial purposes   All illustrations and images included in CareNotes® are the copyrighted property of [employer]  or [address] [place], [place] [address]pe

## 2022-08-01 NOTE — ASSESSMENT & PLAN NOTE
Frustrated with not seeing weight loss in spite of healthy diet and regular exercise since March 1  Discussed low calorie diet

## 2022-08-22 DIAGNOSIS — L23.7 POISON IVY: Primary | ICD-10-CM

## 2022-08-22 RX ORDER — TRIAMCINOLONE ACETONIDE 1 MG/G
CREAM TOPICAL 2 TIMES DAILY
Qty: 30 G | Refills: 0 | Status: SHIPPED | OUTPATIENT
Start: 2022-08-22 | End: 2023-08-31 | Stop reason: ALTCHOICE

## 2022-08-23 DIAGNOSIS — E78.2 MIXED HYPERLIPIDEMIA: ICD-10-CM

## 2022-08-23 RX ORDER — ROSUVASTATIN CALCIUM 5 MG/1
5 TABLET, COATED ORAL DAILY
Qty: 90 TABLET | Refills: 2 | Status: SHIPPED | OUTPATIENT
Start: 2022-08-23 | End: 2022-08-28 | Stop reason: SDUPTHER

## 2022-08-28 DIAGNOSIS — E78.2 MIXED HYPERLIPIDEMIA: ICD-10-CM

## 2022-08-29 RX ORDER — ROSUVASTATIN CALCIUM 5 MG/1
5 TABLET, COATED ORAL DAILY
Qty: 90 TABLET | Refills: 3 | Status: SHIPPED | OUTPATIENT
Start: 2022-08-29 | End: 2022-10-22

## 2022-10-17 ENCOUNTER — RX CHECK (OUTPATIENT)
Dept: URBAN - METROPOLITAN AREA CLINIC 6 | Facility: CLINIC | Age: 72
End: 2022-10-17

## 2022-10-17 DIAGNOSIS — H35.363: ICD-10-CM

## 2022-10-17 DIAGNOSIS — H43.811: ICD-10-CM

## 2022-10-17 DIAGNOSIS — H18.453: ICD-10-CM

## 2022-10-17 DIAGNOSIS — Z96.1: ICD-10-CM

## 2022-10-17 PROCEDURE — 92014 COMPRE OPH EXAM EST PT 1/>: CPT

## 2022-10-17 ASSESSMENT — KERATOMETRY
OD_AXISANGLE2_DEGREES: 168
OD_K1POWER_DIOPTERS: 41.50
OS_K2POWER_DIOPTERS: 43.50
OD_K2POWER_DIOPTERS: 42.50
OD_AXISANGLE_DEGREES: 78
OS_K1POWER_DIOPTERS: 42.00
OS_AXISANGLE2_DEGREES: 85
OS_AXISANGLE_DEGREES: 175

## 2022-10-17 ASSESSMENT — TONOMETRY
OS_IOP_MMHG: 10
OD_IOP_MMHG: 10

## 2022-10-17 ASSESSMENT — VISUAL ACUITY
OD_PH: 20/30
OD_CC: 20/40
OS_CC: 20/30

## 2022-10-22 DIAGNOSIS — E78.2 MIXED HYPERLIPIDEMIA: ICD-10-CM

## 2022-10-22 RX ORDER — ROSUVASTATIN CALCIUM 5 MG/1
5 TABLET, COATED ORAL DAILY
Qty: 90 TABLET | Refills: 4 | Status: SHIPPED | OUTPATIENT
Start: 2022-10-22

## 2022-12-02 ENCOUNTER — APPOINTMENT (OUTPATIENT)
Dept: LAB | Facility: MEDICAL CENTER | Age: 72
End: 2022-12-02

## 2022-12-02 DIAGNOSIS — E78.2 MIXED HYPERLIPIDEMIA: ICD-10-CM

## 2022-12-02 LAB
ALBUMIN SERPL BCP-MCNC: 3.6 G/DL (ref 3.5–5)
ALP SERPL-CCNC: 65 U/L (ref 46–116)
ALT SERPL W P-5'-P-CCNC: 28 U/L (ref 12–78)
AST SERPL W P-5'-P-CCNC: 14 U/L (ref 5–45)
BILIRUB DIRECT SERPL-MCNC: 0.12 MG/DL (ref 0–0.2)
BILIRUB SERPL-MCNC: 0.61 MG/DL (ref 0.2–1)
CHOLEST SERPL-MCNC: 208 MG/DL
HDLC SERPL-MCNC: 53 MG/DL
LDLC SERPL CALC-MCNC: 111 MG/DL (ref 0–100)
PROT SERPL-MCNC: 7.9 G/DL (ref 6.4–8.4)
TRIGL SERPL-MCNC: 220 MG/DL

## 2022-12-05 ENCOUNTER — TELEPHONE (OUTPATIENT)
Dept: INTERNAL MEDICINE CLINIC | Facility: CLINIC | Age: 72
End: 2022-12-05

## 2022-12-05 DIAGNOSIS — E78.2 MIXED HYPERLIPIDEMIA: ICD-10-CM

## 2022-12-05 RX ORDER — ROSUVASTATIN CALCIUM 10 MG/1
10 TABLET, COATED ORAL DAILY
Qty: 90 TABLET | Refills: 1 | Status: SHIPPED | OUTPATIENT
Start: 2022-12-05 | End: 2023-01-31 | Stop reason: SDUPTHER

## 2022-12-05 NOTE — TELEPHONE ENCOUNTER
Spoke to patient  She experienced knee pain initially so only took rosuvastatin 5mg every other day but this resolved after 2 weeks so she is taking rosuvastatin 5mg daily  LDL down to 111 from 159  I recommend increasing the rosuvastatin to 10mg which she agrees to do  Recheck lipids in 4months      ----- Message from Nic Garcia MA sent at 12/2/2022  4:00 PM EST -----  Regarding: FW: Test results  Contact: [phone]    ----- Message -----  From: Ana Charlton  Sent: 12/2/2022   3:46 PM EST  To: [facility]  Subject: Test results                                     Hi Dr Lalo Romo  Could you please call me on Monday and go over with me my blood test results  No emergency at your convenience  My phone number is [phone]  Thank you    Monster Yin

## 2023-01-31 ENCOUNTER — NURSE TRIAGE (OUTPATIENT)
Dept: OTHER | Facility: OTHER | Age: 73
End: 2023-01-31

## 2023-01-31 DIAGNOSIS — J06.9 UPPER RESPIRATORY TRACT INFECTION, UNSPECIFIED TYPE: Primary | ICD-10-CM

## 2023-01-31 DIAGNOSIS — E78.2 MIXED HYPERLIPIDEMIA: ICD-10-CM

## 2023-01-31 RX ORDER — BENZONATATE 100 MG/1
100 CAPSULE ORAL 3 TIMES DAILY PRN
Qty: 20 CAPSULE | Refills: 0 | Status: SHIPPED | OUTPATIENT
Start: 2023-01-31 | End: 2023-01-31 | Stop reason: SDUPTHER

## 2023-01-31 RX ORDER — BENZONATATE 100 MG/1
100 CAPSULE ORAL 3 TIMES DAILY PRN
Qty: 20 CAPSULE | Refills: 0 | Status: SHIPPED | OUTPATIENT
Start: 2023-01-31 | End: 2023-08-31 | Stop reason: ALTCHOICE

## 2023-01-31 RX ORDER — ROSUVASTATIN CALCIUM 10 MG/1
10 TABLET, COATED ORAL DAILY
Qty: 90 TABLET | Refills: 0 | Status: SHIPPED | OUTPATIENT
Start: 2023-01-31

## 2023-01-31 NOTE — TELEPHONE ENCOUNTER
Patient called in to report acute nonproductive cough for past two days  Using Robitussin with no relief  Requesting PCP prescribe stronger medication for cough  Please follow up with patient  Patient has different prescription plan and must use [facility] in [facility]; on profile    Reason for Disposition  • Cough    Answer Assessment - Initial Assessment Questions  1  ONSET: "When did the cough begin?"       1/29/23 PM  2  SEVERITY: "How bad is the cough today?"       Sporadic; can go several hours before she starts coughing again  3  SPUTUM: "Describe the color of your sputum" (none, dry cough; clear, white, yellow, green)      Denies  4  HEMOPTYSIS: "Are you coughing up any blood?" If so ask: "How much?" (flecks, streaks, tablespoons, etc )     Denies  5  DIFFICULTY BREATHING: "Are you having difficulty breathing?" If Yes, ask: "How bad is it?" (e g , mild, moderate, severe)     - MILD: No SOB at rest, mild SOB with walking, speaks normally in sentences, can lay down, no retractions, pulse < 100      - MODERATE: SOB at rest, SOB with minimal exertion and prefers to sit, cannot lie down flat, speaks in phrases, mild retractions, audible wheezing, pulse 100-120      - SEVERE: Very SOB at rest, speaks in single words, struggling to breathe, sitting hunched forward, retractions, pulse > 120       Denies  6  FEVER: "Do you have a fever?" If Yes, ask: "What is your temperature, how was it measured, and when did it start?"      Denies  7  CARDIAC HISTORY: "Do you have any history of heart disease?" (e g , heart attack, congestive heart failure)       Denies  8  LUNG HISTORY: "Do you have any history of lung disease?"  (e g , pulmonary embolus, asthma, emphysema)      Denies  9  PE RISK FACTORS: "Do you have a history of blood clots?" (or: recent major surgery, recent prolonged travel, bedridden)     Denies  10   OTHER SYMPTOMS: "Do you have any other symptoms?" (e g , runny nose, wheezing, chest pain) Denies  11  PREGNANCY: "Is there any chance you are pregnant?" "When was your last menstrual period?"        Post menopausal  12   TRAVEL: "Have you traveled out of the country in the last month?" (e g , travel history, exposures)        Denies    Protocols used: COUGH - ACUTE NON-PRODUCTIVE-ADULT-AH

## 2023-01-31 NOTE — TELEPHONE ENCOUNTER
I sent benzonatate cough suppressant   Advise to test for COVID if she has not done so Left message for patient to call us back.  Phone number was provided.

## 2023-01-31 NOTE — TELEPHONE ENCOUNTER
Regarding: cough  ----- Message from Narayan Ball sent at 1/31/2023  4:20 PM EST -----  "I have had a cough for a few days    I have tested negative for Covid "

## 2023-04-14 DIAGNOSIS — E78.2 MIXED HYPERLIPIDEMIA: Primary | ICD-10-CM

## 2023-04-17 DIAGNOSIS — E78.2 MIXED HYPERLIPIDEMIA: ICD-10-CM

## 2023-04-17 RX ORDER — ROSUVASTATIN CALCIUM 10 MG/1
10 TABLET, COATED ORAL DAILY
Qty: 90 TABLET | Refills: 3 | Status: SHIPPED | OUTPATIENT
Start: 2023-04-17

## 2023-07-17 ENCOUNTER — HOSPITAL ENCOUNTER (OUTPATIENT)
Dept: RADIOLOGY | Facility: MEDICAL CENTER | Age: 73
Discharge: HOME | End: 2023-07-17
Payer: MEDICARE

## 2023-07-17 VITALS — WEIGHT: 195 LBS | HEIGHT: 65 IN | BODY MASS INDEX: 32.49 KG/M2

## 2023-07-17 DIAGNOSIS — Z12.31 ENCOUNTER FOR SCREENING MAMMOGRAM FOR MALIGNANT NEOPLASM OF BREAST: ICD-10-CM

## 2023-07-17 DIAGNOSIS — Z12.31 SCREENING MAMMOGRAM FOR BREAST CANCER: ICD-10-CM

## 2023-07-17 PROCEDURE — 77067 SCR MAMMO BI INCL CAD: CPT

## 2023-07-17 PROCEDURE — 77063 BREAST TOMOSYNTHESIS BI: CPT

## 2023-07-19 DIAGNOSIS — E78.2 MIXED HYPERLIPIDEMIA: Primary | ICD-10-CM

## 2023-08-28 ENCOUNTER — APPOINTMENT (OUTPATIENT)
Dept: LAB | Facility: MEDICAL CENTER | Age: 73
End: 2023-08-28
Payer: MEDICARE

## 2023-08-28 DIAGNOSIS — E78.2 MIXED HYPERLIPIDEMIA: ICD-10-CM

## 2023-08-28 LAB
ALBUMIN SERPL BCP-MCNC: 4.3 G/DL (ref 3.5–5)
ALP SERPL-CCNC: 55 U/L (ref 34–104)
ALT SERPL W P-5'-P-CCNC: 20 U/L (ref 7–52)
ANION GAP SERPL CALCULATED.3IONS-SCNC: 8 MMOL/L
AST SERPL W P-5'-P-CCNC: 20 U/L (ref 13–39)
BASOPHILS # BLD AUTO: 0.06 THOUSANDS/ÂΜL (ref 0–0.1)
BASOPHILS NFR BLD AUTO: 1 % (ref 0–1)
BILIRUB SERPL-MCNC: 0.68 MG/DL (ref 0.2–1)
BUN SERPL-MCNC: 15 MG/DL (ref 5–25)
CALCIUM SERPL-MCNC: 9.4 MG/DL (ref 8.4–10.2)
CHLORIDE SERPL-SCNC: 106 MMOL/L (ref 96–108)
CHOLEST SERPL-MCNC: 167 MG/DL
CO2 SERPL-SCNC: 26 MMOL/L (ref 21–32)
CREAT SERPL-MCNC: 0.9 MG/DL (ref 0.6–1.3)
EOSINOPHIL # BLD AUTO: 0.11 THOUSAND/ÂΜL (ref 0–0.61)
EOSINOPHIL NFR BLD AUTO: 2 % (ref 0–6)
ERYTHROCYTE [DISTWIDTH] IN BLOOD BY AUTOMATED COUNT: 13.4 % (ref 11.6–15.1)
GFR SERPL CREATININE-BSD FRML MDRD: 64 ML/MIN/1.73SQ M
GLUCOSE P FAST SERPL-MCNC: 86 MG/DL (ref 65–99)
HCT VFR BLD AUTO: 46 % (ref 34.8–46.1)
HDLC SERPL-MCNC: 53 MG/DL
HGB BLD-MCNC: 14.2 G/DL (ref 11.5–15.4)
IMM GRANULOCYTES # BLD AUTO: 0.01 THOUSAND/UL (ref 0–0.2)
IMM GRANULOCYTES NFR BLD AUTO: 0 % (ref 0–2)
LDLC SERPL CALC-MCNC: 83 MG/DL (ref 0–100)
LYMPHOCYTES # BLD AUTO: 2.51 THOUSANDS/ÂΜL (ref 0.6–4.47)
LYMPHOCYTES NFR BLD AUTO: 41 % (ref 14–44)
MCH RBC QN AUTO: 29.2 PG (ref 26.8–34.3)
MCHC RBC AUTO-ENTMCNC: 30.9 G/DL (ref 31.4–37.4)
MCV RBC AUTO: 95 FL (ref 82–98)
MONOCYTES # BLD AUTO: 0.52 THOUSAND/ÂΜL (ref 0.17–1.22)
MONOCYTES NFR BLD AUTO: 9 % (ref 4–12)
NEUTROPHILS # BLD AUTO: 2.88 THOUSANDS/ÂΜL (ref 1.85–7.62)
NEUTS SEG NFR BLD AUTO: 47 % (ref 43–75)
NRBC BLD AUTO-RTO: 0 /100 WBCS
PLATELET # BLD AUTO: 314 THOUSANDS/UL (ref 149–390)
PMV BLD AUTO: 10.1 FL (ref 8.9–12.7)
POTASSIUM SERPL-SCNC: 4.3 MMOL/L (ref 3.5–5.3)
PROT SERPL-MCNC: 7.4 G/DL (ref 6.4–8.4)
RBC # BLD AUTO: 4.87 MILLION/UL (ref 3.81–5.12)
SODIUM SERPL-SCNC: 140 MMOL/L (ref 135–147)
TRIGL SERPL-MCNC: 153 MG/DL
WBC # BLD AUTO: 6.09 THOUSAND/UL (ref 4.31–10.16)

## 2023-08-28 PROCEDURE — 80061 LIPID PANEL: CPT

## 2023-08-28 PROCEDURE — 85025 COMPLETE CBC W/AUTO DIFF WBC: CPT

## 2023-08-28 PROCEDURE — 36415 COLL VENOUS BLD VENIPUNCTURE: CPT

## 2023-08-28 PROCEDURE — 80053 COMPREHEN METABOLIC PANEL: CPT

## 2023-08-29 ENCOUNTER — RA CDI HCC (OUTPATIENT)
Dept: OTHER | Facility: HOSPITAL | Age: 73
End: 2023-08-29

## 2023-08-31 ENCOUNTER — OFFICE VISIT (OUTPATIENT)
Dept: INTERNAL MEDICINE CLINIC | Facility: CLINIC | Age: 73
End: 2023-08-31
Payer: MEDICARE

## 2023-08-31 VITALS
DIASTOLIC BLOOD PRESSURE: 80 MMHG | HEIGHT: 65 IN | RESPIRATION RATE: 16 BRPM | OXYGEN SATURATION: 95 % | HEART RATE: 69 BPM | WEIGHT: 207 LBS | SYSTOLIC BLOOD PRESSURE: 124 MMHG | BODY MASS INDEX: 34.49 KG/M2

## 2023-08-31 DIAGNOSIS — M85.89 OSTEOPENIA OF MULTIPLE SITES: ICD-10-CM

## 2023-08-31 DIAGNOSIS — Z00.00 MEDICARE ANNUAL WELLNESS VISIT, SUBSEQUENT: Primary | ICD-10-CM

## 2023-08-31 DIAGNOSIS — Z12.31 SCREENING MAMMOGRAM FOR BREAST CANCER: ICD-10-CM

## 2023-08-31 DIAGNOSIS — E66.09 CLASS 1 OBESITY DUE TO EXCESS CALORIES WITHOUT SERIOUS COMORBIDITY WITH BODY MASS INDEX (BMI) OF 34.0 TO 34.9 IN ADULT: ICD-10-CM

## 2023-08-31 DIAGNOSIS — E78.2 MIXED HYPERLIPIDEMIA: ICD-10-CM

## 2023-08-31 DIAGNOSIS — Z12.11 SCREEN FOR COLON CANCER: ICD-10-CM

## 2023-08-31 PROCEDURE — G0439 PPPS, SUBSEQ VISIT: HCPCS | Performed by: INTERNAL MEDICINE

## 2023-08-31 NOTE — PATIENT INSTRUCTIONS
Medicare Preventive Visit Patient Instructions  Thank you for completing your Welcome to Medicare Visit or Medicare Annual Wellness Visit today. Your next wellness visit will be due in one year (8/31/2024). The screening/preventive services that you may require over the next 5-10 years are detailed below. Some tests may not apply to you based off risk factors and/or age. Screening tests ordered at today's visit but not completed yet may show as past due. Also, please note that scanned in results may not display below. Preventive Screenings:  Service Recommendations Previous Testing/Comments   Colorectal Cancer Screening  * Colonoscopy    * Fecal Occult Blood Test (FOBT)/Fecal Immunochemical Test (FIT)  * Fecal DNA/Cologuard Test  * Flexible Sigmoidoscopy Age: 43-73 years old   Colonoscopy: every 10 years (may be performed more frequently if at higher risk)  OR  FOBT/FIT: every 1 year  OR  Cologuard: every 3 years  OR  Sigmoidoscopy: every 5 years  Screening may be recommended earlier than age 39 if at higher risk for colorectal cancer. Also, an individualized decision between you and your healthcare provider will decide whether screening between the ages of 77-80 would be appropriate. Colonoscopy: Not on file  FOBT/FIT: Not on file  Cologuard: Not on file  Sigmoidoscopy: Not on file          Breast Cancer Screening Age: 36 years old  Frequency: every 1-2 years  Not required if history of left and right mastectomy Mammogram: 07/17/2023        Cervical Cancer Screening Between the ages of 21-29, pap smear recommended once every 3 years. Between the ages of 32-69, can perform pap smear with HPV co-testing every 5 years.    Recommendations may differ for women with a history of total hysterectomy, cervical cancer, or abnormal pap smears in past. Pap Smear: Not on file        Hepatitis C Screening Once for adults born between [address] [place] [address]  More frequently in patients at high risk for Hepatitis C Hep C Antibody: 07/27/2020        Diabetes Screening 1-2 times per year if you're at risk for diabetes or have pre-diabetes Fasting glucose: 86 mg/dL (8/28/2023)  A1C: No results in last 5 years (No results in last 5 years)      Cholesterol Screening Once every 5 years if you don't have a lipid disorder. May order more often based on risk factors. Lipid panel: 08/28/2023          Other Preventive Screenings Covered by Medicare:  1. Abdominal Aortic Aneurysm (AAA) Screening: covered once if your at risk. You're considered to be at risk if you have a family history of AAA. 2. Lung Cancer Screening: covers low dose CT scan once per year if you meet all of the following conditions: (1) Age 48-67; (2) No signs or symptoms of lung cancer; (3) Current smoker or have quit smoking within the last 15 years; (4) You have a tobacco smoking history of at least 20 pack years (packs per day multiplied by number of years you smoked); (5) You get a written order from a healthcare provider. 3. Glaucoma Screening: covered annually if you're considered high risk: (1) You have diabetes OR (2) Family history of glaucoma OR (3)  aged 48 and older OR (3)  American aged 72 and older  3. Osteoporosis Screening: covered every 2 years if you meet one of the following conditions: (1) You're estrogen deficient and at risk for osteoporosis based off medical history and other findings; (2) Have a vertebral abnormality; (3) On glucocorticoid therapy for more than 3 months; (4) Have primary hyperparathyroidism; (5) On osteoporosis medications and need to assess response to drug therapy. · Last bone density test (DXA Scan): 05/04/2021.  5. HIV Screening: covered annually if you're between the age of 15-65. Also covered annually if you are younger than 13 and older than 72 with risk factors for HIV infection. For pregnant patients, it is covered up to 3 times per pregnancy.     Immunizations:  Immunization Recommendations   Influenza [Takes medication as prescribed] : takes Vaccine Annual influenza vaccination during flu season is recommended for all persons aged >= 6 months who do not have contraindications   Pneumococcal Vaccine   * Pneumococcal conjugate vaccine = PCV13 (Prevnar 13), PCV15 (Vaxneuvance), PCV20 (Prevnar 20)  * Pneumococcal polysaccharide vaccine = PPSV23 (Pneumovax) Adults 20-63 years old: 1-3 doses may be recommended based on certain risk factors  Adults 72 years old: 1-2 doses may be recommended based off what pneumonia vaccine you previously received   Hepatitis B Vaccine 3 dose series if at intermediate or high risk (ex: diabetes, end stage renal disease, liver disease)   Tetanus (Td) Vaccine - COST NOT COVERED BY MEDICARE PART B Following completion of primary series, a booster dose should be given every 10 years to maintain immunity against tetanus. Td may also be given as tetanus wound prophylaxis. Tdap Vaccine - COST NOT COVERED BY MEDICARE PART B Recommended at least once for all adults. For pregnant patients, recommended with each pregnancy. Shingles Vaccine (Shingrix) - COST NOT COVERED BY MEDICARE PART B  2 shot series recommended in those aged 48 and above     Health Maintenance Due:      Topic Date Due   • Colorectal Cancer Screening  Never done   • Breast Cancer Screening: Mammogram  07/17/2024   • Hepatitis C Screening  Completed     Immunizations Due:      Topic Date Due   • COVID-19 Vaccine (6 - Pfizer series) 02/14/2023   • Influenza Vaccine (1) 09/01/2023     Advance Directives   What are advance directives? Advance directives are legal documents that state your wishes and plans for medical care. These plans are made ahead of time in case you lose your ability to make decisions for yourself. Advance directives can apply to any medical decision, such as the treatments you want, and if you want to donate organs. What are the types of advance directives?   There are many types of advance directives, and each state has rules about how to use [None] : Patient does not have any barriers to medication adherence them. You may choose a combination of any of the following:  · Living will: This is a written record of the treatment you want. You can also choose which treatments you do not want, which to limit, and which to stop at a certain time. This includes surgery, medicine, IV fluid, and tube feedings. · Durable power of  for healthcare [facility]): This is a written record that states who you want to make healthcare choices for you when you are unable to make them for yourself. This person, called a proxy, is usually a family member or a friend. You may choose more than 1 proxy. · Do not resuscitate (DNR) order:  A DNR order is used in case your heart stops beating or you stop breathing. It is a request not to have certain forms of treatment, such as CPR. A DNR order may be included in other types of advance directives. · Medical directive: This covers the care that you want if you are in a coma, near death, or unable to make decisions for yourself. You can list the treatments you want for each condition. Treatment may include pain medicine, surgery, blood transfusions, dialysis, IV or tube feedings, and a ventilator (breathing machine). · Values history: This document has questions about your views, beliefs, and how you feel and think about life. This information can help others choose the care that you would choose. Why are advance directives important? An advance directive helps you control your care. Although spoken wishes may be used, it is better to have your wishes written down. Spoken wishes can be misunderstood, or not followed. Treatments may be given even if you do not want them. An advance directive may make it easier for your family to make difficult choices about your care. Weight Management   Why it is important to manage your weight:  Being overweight increases your risk of health conditions such as heart disease, high blood pressure, type 2 diabetes, and certain types of cancer.  It can also increase your risk for osteoarthritis, sleep apnea, and other respiratory problems. Aim for a slow, steady weight loss. Even a small amount of weight loss can lower your risk of health problems. How to lose weight safely:  A safe and healthy way to lose weight is to eat fewer calories and get regular exercise. You can lose up about 1 pound a week by decreasing the number of calories you eat by 500 calories each day. Healthy meal plan for weight management:  A healthy meal plan includes a variety of foods, contains fewer calories, and helps you stay healthy. A healthy meal plan includes the following:  · Eat whole-grain foods more often. A healthy meal plan should contain fiber. Fiber is the part of grains, fruits, and vegetables that is not broken down by your body. Whole-grain foods are healthy and provide extra fiber in your diet. Some examples of whole-grain foods are whole-wheat breads and pastas, oatmeal, brown rice, and bulgur. · Eat a variety of vegetables every day. Include dark, leafy greens such as spinach, kale, camille greens, and mustard greens. Eat yellow and orange vegetables such as carrots, sweet potatoes, and winter squash. · Eat a variety of fruits every day. Choose fresh or canned fruit (canned in its own juice or light syrup) instead of juice. Fruit juice has very little or no fiber. · Eat low-fat dairy foods. Drink fat-free (skim) milk or 1% milk. Eat fat-free yogurt and low-fat cottage cheese. Try low-fat cheeses such as mozzarella and other reduced-fat cheeses. · Choose meat and other protein foods that are low in fat. Choose beans or other legumes such as split peas or lentils. Choose fish, skinless poultry (chicken or turkey), or lean cuts of red meat (beef or pork). Before you cook meat or poultry, cut off any visible fat. · Use less fat and oil. Try baking foods instead of frying them.  Add less fat, such as margarine, sour cream, regular salad dressing and mayonnaise to foods. Eat fewer high-fat foods. Some examples of high-fat foods include french fries, doughnuts, ice cream, and cakes. · Eat fewer sweets. Limit foods and drinks that are high in sugar. This includes candy, cookies, regular soda, and sweetened drinks. Exercise:  Exercise at least 30 minutes per day on most days of the week. Some examples of exercise include walking, biking, dancing, and swimming. You can also fit in more physical activity by taking the stairs instead of the elevator or parking farther away from stores. Ask your healthcare provider about the best exercise plan for you. © Copyright [employer] 2018 Information is for End User's use only and may not be sold, redistributed or otherwise used for commercial purposes.  All illustrations and images included in CareNotes® are the copyrighted property of [employer] Inc. or [address] [place] [address]

## 2023-08-31 NOTE — PROGRESS NOTES
Assessment and Plan:     Problem List Items Addressed This Visit        Other    Mixed hyperlipidemia    Relevant Orders    CBC and differential    Comprehensive metabolic panel    Lipid panel    Class 1 obesity due to excess calories without serious comorbidity in adult   Other Visit Diagnoses     Medicare annual wellness visit, subsequent    -  Primary    Screen for colon cancer        Relevant Orders    Cologuard    Screening mammogram for breast cancer        Relevant Orders    Mammo screening bilateral w 3d & cad    Osteopenia of multiple sites        Relevant Orders    DXA bone density spine hip and pelvis        BMI Counseling: Body mass index is 34.45 kg/m². The BMI is above normal. Nutrition recommendations include decreasing portion sizes, moderation in carbohydrate intake and reducing intake of saturated and trans fat. Exercise recommendations include exercising 3-5 times per week. Rationale for BMI follow-up plan is due to patient being overweight or obese. Depression Screening and Follow-up Plan: Patient was screened for depression during today's encounter. They screened negative with a PHQ-2 score of 0. Preventive health issues were discussed with patient, and age appropriate screening tests were ordered as noted in patient's After Visit Summary. Personalized health advice and appropriate referrals for health education or preventive services given if needed, as noted in patient's After Visit Summary. Annual flu vaccine, COVID booster and RSV vaccine discussed     History of Present Illness:     Patient presents for a Medicare Wellness Visit    HPI   Patient Care Team:  Shanita Conteh MD as PCP - General (Internal Medicine)     Review of Systems:     Review of Systems   Constitutional: Negative for fatigue. Respiratory: Negative for shortness of breath. Cardiovascular: Negative for chest pain and palpitations. Gastrointestinal: Negative for abdominal pain, constipation and diarrhea. Genitourinary: Negative for difficulty urinating. Neurological: Negative for dizziness and headaches. Psychiatric/Behavioral: The patient is nervous/anxious (over getting a colonsocopy). Problem List:     Patient Active Problem List   Diagnosis   • Mixed hyperlipidemia   • Class 1 obesity due to excess calories without serious comorbidity in adult      Past Medical and Surgical History:     Past Medical History:   Diagnosis Date   • Bradycardia 4/3/2020   • Lightheaded 1/17/2022   • Vertigo      History reviewed. No pertinent surgical history. Family History:     Family History   Problem Relation Age of Onset   • Dementia Mother    • Heart attack Father    • No Known Problems Brother    • No Known Problems Brother    • No Known Problems Son    • Hyperlipidemia Son    • No Known Problems Son    • No Known Problems Daughter    • No Known Problems Maternal Grandmother    • No Known Problems Maternal Grandfather    • No Known Problems Paternal Grandmother    • No Known Problems Paternal Grandfather    • Brain cancer Maternal Aunt    • Coronary artery disease Maternal Uncle       Social History:     Social History     Socioeconomic History   • Marital status:       Spouse name: None   • Number of children: None   • Years of education: None   • Highest education level: None   Occupational History   • None   Tobacco Use   • Smoking status: Never   • Smokeless tobacco: Never   Vaping Use   • Vaping Use: Never used   Substance and Sexual Activity   • Alcohol use: Yes     Comment: 1 per month   • Drug use: Never   • Sexual activity: Not Currently   Other Topics Concern   • None   Social History Narrative   • None     Social Determinants of Health     Financial Resource Strain: Low Risk  (8/28/2023)    Overall Financial Resource Strain (CARDIA)    • Difficulty of Paying Living Expenses: Not hard at all   Food Insecurity: Not on file   Transportation Needs: No Transportation Needs (8/28/2023)    Nadeem Rush Transportation    • Lack of Transportation (Medical): No    • Lack of Transportation (Non-Medical): No   Physical Activity: Not on file   Stress: Not on file   Social Connections: Not on file   Intimate Partner Violence: Not on file   Housing Stability: Not on file      Medications and Allergies:     Current Outpatient Medications   Medication Sig Dispense Refill   • Calcium Ascorbate (VITAMIN C) 500 mg tablet Take 500 mg by mouth daily     • Calcium Carb-Cholecalciferol (CALCIUM 1000 + D PO) Take 1 tablet by mouth daily     • Lysine 500 MG CAPS Take 1 capsule by mouth daily     • Multiple Vitamin (MULTIVITAMIN ADULT PO) Take 1 tablet by mouth daily     • rosuvastatin (CRESTOR) 10 MG tablet Take 1 tablet (10 mg total) by mouth daily 90 tablet 3   • Vitamin D, Cholecalciferol, 50 MCG (2000 UT) CAPS Take 1 tablet by mouth daily     • vitamin E, tocopherol, 400 units capsule Take 400 Units by mouth daily       No current facility-administered medications for this visit.      Allergies   Allergen Reactions   • Atorvastatin Arthralgia      Immunizations:     Immunization History   Administered Date(s) Administered   • COVID-19 PFIZER VACCINE 0.3 ML IM 03/18/2021, 04/11/2021, 10/28/2021   • COVID-19 Pfizer Vac BIVALENT Sim-sucrose 12 Yr+ IM (BOOSTER ONLY) 10/14/2022   • COVID-19 Pfizer vac (Sim-sucrose, gray cap) 12 yr+ IM 04/12/2022   • INFLUENZA 10/04/2017, 09/05/2020, 10/14/2021, 09/21/2022   • Pneumococcal Conjugate 13-Valent 07/24/2020   • Pneumococcal Polysaccharide PPV23 07/27/2021   • Tdap 08/01/2022   • Zoster Vaccine Recombinant 09/07/2021, 12/07/2021      Health Maintenance:         Topic Date Due   • Colorectal Cancer Screening  Never done   • Breast Cancer Screening: Mammogram  07/17/2024   • Hepatitis C Screening  Completed         Topic Date Due   • COVID-19 Vaccine (6 - Pfizer series) 02/14/2023   • Influenza Vaccine (1) 09/01/2023      Medicare Screening Tests and Risk Assessments:     Rodríguez Jorge is here for her Subsequent Wellness visit. Health Risk Assessment:   Patient rates overall health as good. Patient feels that their physical health rating is same. Patient is satisfied with their life. Eyesight was rated as same. Hearing was rated as same. Patient feels that their emotional and mental health rating is same. Patients states they are never, rarely angry. Patient states they are never, rarely unusually tired/fatigued. Pain experienced in the last 7 days has been none. Patient states that she has experienced weight loss or gain in last 6 months. Having trouble losing weight. Depression Screening:   PHQ-2 Score: 0      Fall Risk Screening: In the past year, patient has experienced: no history of falling in past year      Urinary Incontinence Screening:   Patient has not leaked urine accidently in the last six months. Home Safety:  Patient has trouble with stairs inside or outside of their home. Patient has working smoke alarms and has working carbon monoxide detector. Home safety hazards include: none. Knees are a littke stiff if sitting too long. Nutrition:   Current diet is Regular. I am thinking of doing nutri system. Would juanitoje to talk to the dr about it. Medications:   Patient is not currently taking any over-the-counter supplements. Patient is able to manage medications. Activities of Daily Living (ADLs)/Instrumental Activities of Daily Living (IADLs):   Walk and transfer into and out of bed and chair?: Yes  Dress and groom yourself?: Yes    Bathe or shower yourself?: Yes    Feed yourself? Yes  Do your laundry/housekeeping?: Yes  Manage your money, pay your bills and track your expenses?: Yes  Make your own meals?: Yes    Do your own shopping?: Yes    Previous Hospitalizations:   Any hospitalizations or ED visits within the last 12 months?: No      Advance Care Planning:   Living will: Yes    Durable POA for healthcare:  Yes    Advanced directive: Yes      Cognitive Screening:   Provider or family/friend/caregiver concerned regarding cognition?: No    PREVENTIVE SCREENINGS      Cardiovascular Screening:    General: Screening Not Indicated and History Lipid Disorder      Diabetes Screening:     General: Screening Current      Colorectal Cancer Screening:     General: Risks and Benefits Discussed    Due for: Cologuard      Breast Cancer Screening:     General: Screening Current      Cervical Cancer Screening:    General: Screening Not Indicated      Osteoporosis Screening:    General: Risks and Benefits Discussed    Due for: DXA Axial      Abdominal Aortic Aneurysm (AAA) Screening:        General: Screening Not Indicated      Lung Cancer Screening:     General: Screening Not Indicated      Hepatitis C Screening:    General: Screening Current    Screening, Brief Intervention, and Referral to Treatment (SBIRT)    Screening  Typical number of drinks in a day: 0  Typical number of drinks in a week: 0  Interpretation: Low risk drinking behavior. AUDIT-C Screenin) How often did you have a drink containing alcohol in the past year? never  2) How many drinks did you have on a typical day when you were drinking in the past year? 0  3) How often did you have 6 or more drinks on one occasion in the past year? never    AUDIT-C Score: 0  Interpretation: Score 0-2 (female): Negative screen for alcohol misuse    Single Item Drug Screening:  How often have you used an illegal drug (including marijuana) or a prescription medication for non-medical reasons in the past year? never    Single Item Drug Screen Score: 0  Interpretation: Negative screen for possible drug use disorder    No results found. Physical Exam:     /80   Pulse 69   Resp 16   Ht 5' 5" (1.651 m)   Wt 93.9 kg (207 lb)   SpO2 95%   BMI 34.45 kg/m²     Physical Exam  Constitutional:       General: She is not in acute distress. Appearance: She is well-developed. She is not ill-appearing, toxic-appearing or diaphoretic.    HENT: Head: Normocephalic and atraumatic. Right Ear: External ear normal. There is no impacted cerumen. Left Ear: External ear normal. There is no impacted cerumen. Eyes:      Conjunctiva/sclera: Conjunctivae normal.   Cardiovascular:      Rate and Rhythm: Normal rate and regular rhythm. Heart sounds: Normal heart sounds. No murmur heard. Pulmonary:      Effort: Pulmonary effort is normal. No respiratory distress. Breath sounds: Normal breath sounds. No stridor. No wheezing or rales. Abdominal:      General: There is no distension. Palpations: Abdomen is soft. There is no mass. Tenderness: There is no abdominal tenderness. There is no guarding or rebound. Musculoskeletal:      Cervical back: Neck supple. Right lower leg: No edema. Left lower leg: No edema. Neurological:      Mental Status: She is alert and oriented to person, place, and time. Psychiatric:         Mood and Affect: Mood normal.         Behavior: Behavior normal.         Thought Content:  Thought content normal.         Judgment: Judgment normal.          Chuyita Peck MD

## 2023-09-28 LAB — COLOGUARD RESULT REPORTABLE: NEGATIVE

## 2023-10-18 ENCOUNTER — ESTABLISHED COMPREHENSIVE EXAM (OUTPATIENT)
Dept: URBAN - METROPOLITAN AREA CLINIC 6 | Facility: CLINIC | Age: 73
End: 2023-10-18

## 2023-10-18 DIAGNOSIS — H35.363: ICD-10-CM

## 2023-10-18 DIAGNOSIS — H18.453: ICD-10-CM

## 2023-10-18 DIAGNOSIS — H43.811: ICD-10-CM

## 2023-10-18 DIAGNOSIS — Z96.1: ICD-10-CM

## 2023-10-18 PROCEDURE — 92014 COMPRE OPH EXAM EST PT 1/>: CPT

## 2023-10-18 PROCEDURE — 92134 CPTRZ OPH DX IMG PST SGM RTA: CPT

## 2023-10-18 ASSESSMENT — KERATOMETRY
OS_AXISANGLE2_DEGREES: 85
OD_AXISANGLE2_DEGREES: 168
OS_AXISANGLE_DEGREES: 175
OS_K1POWER_DIOPTERS: 42.00
OD_AXISANGLE_DEGREES: 78
OD_K2POWER_DIOPTERS: 42.50
OS_K2POWER_DIOPTERS: 43.50
OD_K1POWER_DIOPTERS: 41.50

## 2023-10-18 ASSESSMENT — TONOMETRY
OD_IOP_MMHG: 10
OS_IOP_MMHG: 10

## 2023-10-18 ASSESSMENT — VISUAL ACUITY
OD_CC: 20/30
OS_CC: 20/40

## 2023-11-13 ENCOUNTER — HOSPITAL ENCOUNTER (OUTPATIENT)
Dept: RADIOLOGY | Facility: MEDICAL CENTER | Age: 73
Discharge: HOME | End: 2023-11-13
Payer: MEDICARE

## 2023-11-13 DIAGNOSIS — Z13.820 SCREENING FOR OSTEOPOROSIS: ICD-10-CM

## 2023-11-13 DIAGNOSIS — M85.89 OSTEOPENIA OF MULTIPLE SITES: ICD-10-CM

## 2023-11-13 PROCEDURE — 77080 DXA BONE DENSITY AXIAL: CPT

## 2024-02-01 DIAGNOSIS — E78.2 MIXED HYPERLIPIDEMIA: ICD-10-CM

## 2024-02-02 RX ORDER — ROSUVASTATIN CALCIUM 10 MG/1
10 TABLET, COATED ORAL DAILY
Qty: 90 TABLET | Refills: 1 | Status: SHIPPED | OUTPATIENT
Start: 2024-02-02

## 2024-07-19 ENCOUNTER — HOSPITAL ENCOUNTER (OUTPATIENT)
Dept: RADIOLOGY | Facility: MEDICAL CENTER | Age: 74
Discharge: HOME | End: 2024-07-19
Payer: MEDICARE

## 2024-07-19 VITALS — HEIGHT: 65 IN | BODY MASS INDEX: 34.49 KG/M2 | WEIGHT: 207 LBS

## 2024-07-19 DIAGNOSIS — Z12.31 ENCOUNTER FOR SCREENING MAMMOGRAM FOR MALIGNANT NEOPLASM OF BREAST: ICD-10-CM

## 2024-07-19 DIAGNOSIS — Z12.31 SCREENING MAMMOGRAM FOR BREAST CANCER: ICD-10-CM

## 2024-07-19 PROCEDURE — 77063 BREAST TOMOSYNTHESIS BI: CPT

## 2024-07-19 PROCEDURE — 77067 SCR MAMMO BI INCL CAD: CPT

## 2024-07-20 DIAGNOSIS — E78.2 MIXED HYPERLIPIDEMIA: ICD-10-CM

## 2024-07-20 RX ORDER — ROSUVASTATIN CALCIUM 10 MG/1
10 TABLET, COATED ORAL DAILY
Qty: 100 TABLET | Refills: 1 | Status: SHIPPED | OUTPATIENT
Start: 2024-07-20

## 2024-08-21 ENCOUNTER — HOSPITAL ENCOUNTER (OUTPATIENT)
Dept: ULTRASOUND IMAGING | Facility: CLINIC | Age: 74
Discharge: HOME | End: 2024-08-21
Payer: MEDICARE

## 2024-08-21 ENCOUNTER — HOSPITAL ENCOUNTER (OUTPATIENT)
Dept: MAMMOGRAPHY | Facility: CLINIC | Age: 74
Discharge: HOME | End: 2024-08-21
Payer: MEDICARE

## 2024-08-21 VITALS — HEIGHT: 65 IN | WEIGHT: 207.01 LBS | BODY MASS INDEX: 34.49 KG/M2

## 2024-08-21 DIAGNOSIS — R92.8 ABNORMAL SCREENING MAMMOGRAM: ICD-10-CM

## 2024-08-21 PROCEDURE — 77065 DX MAMMO INCL CAD UNI: CPT

## 2024-08-21 PROCEDURE — 76642 ULTRASOUND BREAST LIMITED: CPT

## 2024-08-21 PROCEDURE — G0279 TOMOSYNTHESIS, MAMMO: HCPCS

## 2024-08-22 ENCOUNTER — TELEPHONE (OUTPATIENT)
Age: 74
End: 2024-08-22

## 2024-08-22 NOTE — PROGRESS NOTES
Met with patient and Dr. Terrell Kennedy regarding recommendation for;    ___X__ RIGHT ______LEFT      _____Ultrasound guided  ___X___ Jose Angel Stereotactic breast biopsy.      __X___Verbalized understanding.    Reviewed clip placement with patient, pt states understanding: Yes: __X___ No: ______  Comments:    Blood thinners:  No: __X___ Yes: ______ What:          Biopsy teaching sheet given:  Yes: ___X___ No: ________    Pt given contact information and adv to call with any questions/needs    Patient advised to arrive at 0830 for a 0900 appointment    Pt given directions and contact information for [facility]

## 2024-08-22 NOTE — TELEPHONE ENCOUNTER
Patient will be in to P/U order for labs and mammo, she received notification on [employer], however her printer is not working so she needs to have hard copy.  Thank you.

## 2024-08-26 ENCOUNTER — APPOINTMENT (OUTPATIENT)
Dept: LAB | Facility: MEDICAL CENTER | Age: 74
End: 2024-08-26
Payer: MEDICARE

## 2024-08-26 DIAGNOSIS — E78.2 MIXED HYPERLIPIDEMIA: ICD-10-CM

## 2024-08-26 LAB
ALBUMIN SERPL BCG-MCNC: 4.3 G/DL (ref 3.5–5)
ALP SERPL-CCNC: 56 U/L (ref 34–104)
ALT SERPL W P-5'-P-CCNC: 15 U/L (ref 7–52)
ANION GAP SERPL CALCULATED.3IONS-SCNC: 8 MMOL/L (ref 4–13)
AST SERPL W P-5'-P-CCNC: 14 U/L (ref 13–39)
BASOPHILS # BLD AUTO: 0.04 THOUSANDS/ÂΜL (ref 0–0.1)
BASOPHILS NFR BLD AUTO: 1 % (ref 0–1)
BILIRUB SERPL-MCNC: 0.46 MG/DL (ref 0.2–1)
BUN SERPL-MCNC: 17 MG/DL (ref 5–25)
CALCIUM SERPL-MCNC: 9.2 MG/DL (ref 8.4–10.2)
CHLORIDE SERPL-SCNC: 106 MMOL/L (ref 96–108)
CHOLEST SERPL-MCNC: 146 MG/DL
CO2 SERPL-SCNC: 26 MMOL/L (ref 21–32)
CREAT SERPL-MCNC: 0.81 MG/DL (ref 0.6–1.3)
EOSINOPHIL # BLD AUTO: 0.08 THOUSAND/ÂΜL (ref 0–0.61)
EOSINOPHIL NFR BLD AUTO: 1 % (ref 0–6)
ERYTHROCYTE [DISTWIDTH] IN BLOOD BY AUTOMATED COUNT: 13.6 % (ref 11.6–15.1)
GFR SERPL CREATININE-BSD FRML MDRD: 72 ML/MIN/1.73SQ M
GLUCOSE P FAST SERPL-MCNC: 88 MG/DL (ref 65–99)
HCT VFR BLD AUTO: 45.1 % (ref 34.8–46.1)
HDLC SERPL-MCNC: 50 MG/DL
HGB BLD-MCNC: 13.9 G/DL (ref 11.5–15.4)
IMM GRANULOCYTES # BLD AUTO: 0.02 THOUSAND/UL (ref 0–0.2)
IMM GRANULOCYTES NFR BLD AUTO: 0 % (ref 0–2)
LDLC SERPL CALC-MCNC: 69 MG/DL (ref 0–100)
LYMPHOCYTES # BLD AUTO: 2.2 THOUSANDS/ÂΜL (ref 0.6–4.47)
LYMPHOCYTES NFR BLD AUTO: 37 % (ref 14–44)
MCH RBC QN AUTO: 28.8 PG (ref 26.8–34.3)
MCHC RBC AUTO-ENTMCNC: 30.8 G/DL (ref 31.4–37.4)
MCV RBC AUTO: 93 FL (ref 82–98)
MONOCYTES # BLD AUTO: 0.5 THOUSAND/ÂΜL (ref 0.17–1.22)
MONOCYTES NFR BLD AUTO: 8 % (ref 4–12)
NEUTROPHILS # BLD AUTO: 3.16 THOUSANDS/ÂΜL (ref 1.85–7.62)
NEUTS SEG NFR BLD AUTO: 53 % (ref 43–75)
NONHDLC SERPL-MCNC: 96 MG/DL
NRBC BLD AUTO-RTO: 0 /100 WBCS
PLATELET # BLD AUTO: 304 THOUSANDS/UL (ref 149–390)
PMV BLD AUTO: 10.7 FL (ref 8.9–12.7)
POTASSIUM SERPL-SCNC: 4.2 MMOL/L (ref 3.5–5.3)
PROT SERPL-MCNC: 6.8 G/DL (ref 6.4–8.4)
RBC # BLD AUTO: 4.83 MILLION/UL (ref 3.81–5.12)
SODIUM SERPL-SCNC: 140 MMOL/L (ref 135–147)
TRIGL SERPL-MCNC: 135 MG/DL
WBC # BLD AUTO: 6 THOUSAND/UL (ref 4.31–10.16)

## 2024-08-26 PROCEDURE — 36415 COLL VENOUS BLD VENIPUNCTURE: CPT

## 2024-08-26 PROCEDURE — 80053 COMPREHEN METABOLIC PANEL: CPT

## 2024-08-26 PROCEDURE — 85025 COMPLETE CBC W/AUTO DIFF WBC: CPT

## 2024-08-26 PROCEDURE — 80061 LIPID PANEL: CPT

## 2024-08-29 ENCOUNTER — HOSPITAL ENCOUNTER (OUTPATIENT)
Dept: MAMMOGRAPHY | Facility: CLINIC | Age: 74
Discharge: HOME | End: 2024-08-29
Payer: MEDICARE

## 2024-08-29 VITALS — SYSTOLIC BLOOD PRESSURE: 111 MMHG | DIASTOLIC BLOOD PRESSURE: 71 MMHG | HEART RATE: 69 BPM

## 2024-08-29 DIAGNOSIS — R92.8 ABNORMAL MAMMOGRAM: ICD-10-CM

## 2024-08-29 PROCEDURE — 88342 IMHCHEM/IMCYTCHM 1ST ANTB: CPT | Performed by: PATHOLOGY

## 2024-08-29 PROCEDURE — 88305 TISSUE EXAM BY PATHOLOGIST: CPT | Performed by: PATHOLOGY

## 2024-08-29 PROCEDURE — 19081 BX BREAST 1ST LESION STRTCTC: CPT

## 2024-08-29 PROCEDURE — A4648 IMPLANTABLE TISSUE MARKER: HCPCS

## 2024-08-29 PROCEDURE — 88360 TUMOR IMMUNOHISTOCHEM/MANUAL: CPT | Performed by: PATHOLOGY

## 2024-08-29 PROCEDURE — 88341 IMHCHEM/IMCYTCHM EA ADD ANTB: CPT | Performed by: PATHOLOGY

## 2024-08-29 RX ORDER — LIDOCAINE HYDROCHLORIDE 10 MG/ML
5 INJECTION, SOLUTION EPIDURAL; INFILTRATION; INTRACAUDAL; PERINEURAL ONCE
Status: COMPLETED | OUTPATIENT
Start: 2024-08-29 | End: 2024-08-29

## 2024-08-29 RX ORDER — LIDOCAINE HYDROCHLORIDE AND EPINEPHRINE BITARTRATE 20; .01 MG/ML; MG/ML
10 INJECTION, SOLUTION SUBCUTANEOUS ONCE
Status: COMPLETED | OUTPATIENT
Start: 2024-08-29 | End: 2024-08-29

## 2024-08-29 RX ADMIN — LIDOCAINE HYDROCHLORIDE AND EPINEPHRINE 10 ML: 20; 10 INJECTION, SOLUTION INFILTRATION; PERINEURAL at 09:09

## 2024-08-29 RX ADMIN — LIDOCAINE HYDROCHLORIDE 5 ML: 10 INJECTION, SOLUTION EPIDURAL; INFILTRATION; INTRACAUDAL; PERINEURAL at 09:09

## 2024-08-29 NOTE — PROGRESS NOTES
Procedure type:    _____ultrasound guided ___x__stereotactic    Breast:    _____Left __x___Right    Location: 1 o'clock    Needle: 8g     # of passes: 6 Cores all submitted    Clip: Lynn    Performed by: Dr. Fowler     Pressure held for 5 minutes by: Angela Gant Strips:    ___X__yes _____no    Band aid: (pressure dressing applied with 4x4 gauze and paper tape)    __X___yes_____no    Tolerated procedure:    __X___yes _____no

## 2024-08-30 NOTE — PROGRESS NOTES
Post procedure call completed    Bleeding: _____yes __X___no    Pain: _____yes ___X___no    Redness/Swelling: ______yes ___X___no    Band aid removed: __x___yes ____no     Steri-Strips intact: ___X___yes _____no (discussed with patient to remove steri strips on 9/4/2024  if they have not come off on their own)    Pt with no questions at this time, adv will call when results available, adv to call with any questions or concerns, has name/# for contact

## 2024-09-03 ENCOUNTER — TELEPHONE (OUTPATIENT)
Dept: MAMMOGRAPHY | Facility: CLINIC | Age: 74
End: 2024-09-03

## 2024-09-03 ENCOUNTER — DOCUMENTATION (OUTPATIENT)
Dept: HEMATOLOGY ONCOLOGY | Facility: CLINIC | Age: 74
End: 2024-09-03

## 2024-09-03 DIAGNOSIS — C50.911 INVASIVE LOBULAR CARCINOMA OF RIGHT BREAST IN FEMALE (HCC): Primary | ICD-10-CM

## 2024-09-03 PROCEDURE — 88342 IMHCHEM/IMCYTCHM 1ST ANTB: CPT | Performed by: PATHOLOGY

## 2024-09-03 PROCEDURE — 88305 TISSUE EXAM BY PATHOLOGIST: CPT | Performed by: PATHOLOGY

## 2024-09-03 PROCEDURE — 88360 TUMOR IMMUNOHISTOCHEM/MANUAL: CPT | Performed by: PATHOLOGY

## 2024-09-03 PROCEDURE — 88341 IMHCHEM/IMCYTCHM EA ADD ANTB: CPT | Performed by: PATHOLOGY

## 2024-09-03 NOTE — PROGRESS NOTES
All records needed are in patients chart. No records retrieval needed at this time.     Referral received/ Chart reviewed for work up completed     Imaging completed:    [] PET/CT   [] MRI   [] CT   [] US   [] Mammo   [] Bone scan   [x] N/A    Pathology completed:    Date:   Location:   [x]N/A    Additional records needed:   [] Genomic report   [] Genetic testing results   [] Office Note   [] Procedure/ Operative note   [] Lab results   [x] N/A      [] Radiation Oncology records retrieval needed (PN to route to rad/onc clerical pool once scheduled) n/a  Date:  Location:

## 2024-09-03 NOTE — PROGRESS NOTES
Breast Cancer Nurse Navigator    Chart reviewed for prepping for initial outreach by nurse navigator.    Diagnosis: R breast ILC and LCIS    Recent Imagin/19/24-Mammo screening bilateral  24-Mammo diagnostic right  24-US right breast diagnostic  24-Right breast stereotactic biopsy    Recent Pathology:   24-tissue exam-right breast stereotactic biopsy    Previous Breast Cancer Diagnosis:  No    Does patient have a DARRON ? no    Is patient diabetic?  YES/NO: no    Is patient on a blood thinner?  no    Surgical Oncology: Requests Dr Walden at the [facility]    Family history of cancer: no    Nurse Navigator will call patient for initial outreach.      Will have Patient Navigator outreach patient after surgical oncology consultation.  Any clinical questions to be directed to ONN or office nurse.

## 2024-09-04 ENCOUNTER — PATIENT OUTREACH (OUTPATIENT)
Dept: HEMATOLOGY ONCOLOGY | Facility: CLINIC | Age: 74
End: 2024-09-04

## 2024-09-04 ENCOUNTER — TELEPHONE (OUTPATIENT)
Dept: GENETICS | Facility: CLINIC | Age: 74
End: 2024-09-04

## 2024-09-04 DIAGNOSIS — C50.911 MALIGNANT NEOPLASM OF RIGHT BREAST IN FEMALE, ESTROGEN RECEPTOR POSITIVE, UNSPECIFIED SITE OF BREAST (HCC): Primary | ICD-10-CM

## 2024-09-04 DIAGNOSIS — Z17.0 MALIGNANT NEOPLASM OF RIGHT BREAST IN FEMALE, ESTROGEN RECEPTOR POSITIVE, UNSPECIFIED SITE OF BREAST (HCC): Primary | ICD-10-CM

## 2024-09-04 PROBLEM — C50.919 INVASIVE LOBULAR CARCINOMA OF BREAST IN FEMALE (HCC): Status: ACTIVE | Noted: 2024-09-04

## 2024-09-04 NOTE — TELEPHONE ENCOUNTER
I introduced myself to Samaria and let her know that her nurse navigator reached out to the cancer risk and genetics program on her behalf.    I reviewed the following with Samaria:    While the majority of cancer occurs by chance, approximately 5-10% of breast cancer has an underlying genetic cause.  Genetic testing is available which can determine if there is an underlying genetic cause to your cancer. Understanding if there is an underlying genetic cause can:  Provide your surgeon with additional information to help with surgical decisions, treatment decisions and eligibility for clinical trials.    It can determine if you have an increased risk for any additional cancers.  Help family members understand their cancer risk.       We work closely with the [facility] breast surgeons and are reaching out to see if you have interest in genetic testing. The reason we are reaching out at this time is that this result may help your surgeon determine the appropriate type of surgery (i.e. lumpectomy vs mastectomy). This test is not a requirement but can take 5-10 days to complete so we would like to start the process as soon as possible so the results are ready for your appointment with your surgeon.       If you are interested in genetic testing, I can collect your family history and initiate genetic testing for you.        Patient elected to pursue testing     Diagnosis Details:  Invasive Lobular Carcinoma, LCIS  Right  Hormone receptors pending    Personal History:  Do you have a personal history of any other cancer? No  If yes type/age of diagnosis:     Family history:   Do you have Ashkenazi Oriental orthodox ancestry? No  If yes, maternal, paternal, or both?    Do you have any children? Yes  How many sons? 3  How many daughters? 1  Do any of your children have a history of cancer? No  If yes type/age of diagnosis:     Do you have any brothers or sisters? Yes  How many brothers? 2  How many sisters? 0  Are they from the same  parents? No  If no how maternal/paternal half-siblings:  Do any of your brothers or sisters have a history of cancer? No  If yes who and the type/age of diagnosis:           Do you have nieces or nephews?Yes  Do any of them have a history of cancer? No  If yes type/age of diagnosis:    Does your mother have a history of cancer? No  If yes, cancer type and age of diagnosis:   Is your mother still living? No  Age/Age of death: 93    Thinking about your mother's family (aunts, uncles, cousins, grandparents) is there anyone with a history of cancer? Yes  If yes, list relationship, cancer type and age of diagnosis:  Maternal Aunt - Brain Cancer age 89 ()  Maternal aunt - Liver Cancer age 78 ()  Maternal Aunt - Leukemia age 66 (d. 68)    Does your father have a history of cancer? No  If yes, cancer type and age of diagnosis:   Is your father still living? No  Age/age of death: 84    Thinking about your father's family (aunts, uncles, cousins, grandparents) is there anyone with a history of cancer? No  If yes, list relationship, cancer type and age of diagnosis:      Types of Results - Positive, Negative, VUS  Positive Result - May explain personal diagnosis/family history. Can give surgeon information on treatment plan, inform future screening/management or tell a person about other possible risks. Positive results can initiate testing for other family members who may be at risk (children, siblings, etc)  Negative Result - Does not give an explanation. Surgical/treatment plan will be based on clinical presentation and will be part of discussion with surgeon. Negative result cannot be passed down to children, but they are still at elevated risk.  Uncertain Result - Common, but treated like a negative result clinically. 90% are downgraded over time.     [facility] billing policy   Most insurance plans cover the cost of genetic testing. The out-of-pocket cost varies due to the differences in  deductibles, co-payments and co-insurance defined by individual plans but 90% of people pay $100 or less for a genetic test     We have genetic counselors available, if you have any additional questions or would like to speak with them we can schedule you a 15 minute appointment.      Plan:  A blood kit will be collected on 9/5/2024 and the patient will be provided information on genetic testing and the lab's billing policy.     [facility] (13 genes): TEREZA, BARD1, BRCA1, BRCA2, CDH1, CHEK2, NF1, PALB2, PTEN, RAD51C, RAD51D, STK11, TP53 with reflex to [facility] CustomNext: Cancer+RNAinsight (59 genes): APC, TEREZA, AXIN2, BAP1, BARD1, BMPR1A, BRCA1, BRCA2, BRIP1, CDH1, CDK4, CDKN1B, CDKN2A, CHEK2, CTNNA1, DICER1, EGLN1, EPCAM, FH, FLCN, GREM1, HOXB13, KIF1B, KIT, MAX, MEN1, MET, MITF, MLH1, MSH2, MSH3, MSH6, MUTYH, NF1, NTHL1, PALB2, PDGFRA PMS2, POLD1, POLE, POT1, PTEN, RAD51C, RAD51D, RB1, RET, SDHA, SDHAF2, SDHB, SDHC, SDHD, SMAD4, SMARCA4, STK11, FEPP770, TP53, TSC1, TSC2, VHL     Result Call Information:  I confirmed the patient's mobile number on file as the best number to call with results  I confirmed with the patient that we can leave a voicemail on the provided numbers    Initial results will take approximately 5-12 days to return     Additional results may take up to 2-3 weeks to complete once test is started.    Patient does not have any further questions, declined meeting with genetic counselor    When your results are ready, someone from the genetics team will call you, review the results, and contact your breast surgeon. You will be contacted with any type of result- positive, negative, or uncertain.

## 2024-09-04 NOTE — PROGRESS NOTES
"Breast Oncology Nurse Navigator    Referral received from [facility]  Called patient for initial outreach from nurse navigator.  Introduced myself and my role as well as members of the navigation team.  Oncology Nurse Navigator will follow patient until they are seen by medical oncology, at which point the patient navigator will follow the patient to survivorship.      Breast cancer diagnosis was made after annual screening mammogram.  Patient states minimal understanding/awareness of diagnosis.  Reviewed the diagnosis of ILC and briefly described the receptors.  Support offered throughout the conversation.    Spoke about possible other tests, including breast MRI, US of axilla and possible DARRON placement.  Patient admits to being \"highly claustrophobic.\"  Encouraged patient to tell Dr Walden this.  Patient states she will.      Confirmed upcoming appointment with Dr. Walden  Patient will be attending alone.  She has not yet told her family about her recent breast cancer diagnosis.    Patient has transportation to appointments.    Patient lives with her son, his wife and their four children.  Support system includes: Son and family, plus three other children.  Referral placed to oncology social worker: no    Cancer family history includes: Maternal aunt-one had brain, one had liver cancer, one had leukemia   Referral to oncology genetics: yes, test explained and STAT referral placed.  Does patient have a prior cancer diagnosis? No    Discussed the Cancer Support Community and some of the programs and services offered  Discussed Breast Cancer Support group that meets monthly at [facility].  Information sent via [employer].    Patient has my contact information and knows she can reach out with questions.  Office hours provided.  Patient provided with the phone number for [phone].  General assessment completed.  Patient does have [employer] set up  [employer] message sent with our team's contact " information.  Spoke for 30 minutes.

## 2024-09-05 ENCOUNTER — OFFICE VISIT (OUTPATIENT)
Dept: SURGICAL ONCOLOGY | Facility: CLINIC | Age: 74
End: 2024-09-05
Payer: MEDICARE

## 2024-09-05 ENCOUNTER — APPOINTMENT (OUTPATIENT)
Dept: LAB | Facility: HOSPITAL | Age: 74
End: 2024-09-05
Payer: COMMERCIAL

## 2024-09-05 VITALS
HEIGHT: 65 IN | WEIGHT: 201 LBS | OXYGEN SATURATION: 94 % | SYSTOLIC BLOOD PRESSURE: 125 MMHG | HEART RATE: 76 BPM | BODY MASS INDEX: 33.49 KG/M2 | DIASTOLIC BLOOD PRESSURE: 78 MMHG

## 2024-09-05 DIAGNOSIS — D05.10 DUCTAL CARCINOMA IN SITU (DCIS) OF BREAST, UNSPECIFIED LATERALITY: ICD-10-CM

## 2024-09-05 DIAGNOSIS — C50.919 INVASIVE LOBULAR CARCINOMA OF BREAST IN FEMALE (HCC): Primary | ICD-10-CM

## 2024-09-05 DIAGNOSIS — C50.911 INVASIVE LOBULAR CARCINOMA OF RIGHT BREAST IN FEMALE (HCC): ICD-10-CM

## 2024-09-05 PROCEDURE — 99205 OFFICE O/P NEW HI 60 MIN: CPT | Performed by: SURGERY

## 2024-09-05 PROCEDURE — 36415 COLL VENOUS BLD VENIPUNCTURE: CPT

## 2024-09-05 RX ORDER — LORAZEPAM 0.5 MG/1
TABLET ORAL
Qty: 2 TABLET | Refills: 0 | Status: SHIPPED | OUTPATIENT
Start: 2024-09-05

## 2024-09-05 NOTE — PATIENT INSTRUCTIONS
Complete the Following:  Genetic Testing  CT Chest, Abdomen and Pelvis  US Right Axilla  Bone Scan  Follow Up with Dr. Walden after all imaging and blood work.

## 2024-09-05 NOTE — PROGRESS NOTES
Surgical Oncology Consult Note       [facility]  [facility] [place]  [address] [address]    Samaria Barger  1950  [id number]      Chief Complaint   Patient presents with    Consult     Right breast invasive lobular cancer        Assessment & Plan    1. Invasive lobular carcinoma of breast in female (HCC)  2. Invasive lobular carcinoma of right breast in female (HCC)  -     Ambulatory Referral to Surgical Oncology       Oncology History   Invasive lobular carcinoma of breast in female (HCC)   8/29/2024 Biopsy    Breast, Right, stereo specimen 1- 1:00- 6 cores all submitted:  Invasive lobular carcinoma  ER positive  SC positive  HER2 negative          Is a very pleasant 74-year-old female with no family history of breast cancer non-smoker she went for routine screening mammogram followed by diagnostic mammogram right breast with ultrasound.  Followed by stereotactic right breast biopsy consistent with invasive lobular carcinoma unifocal 1 cm .  She has 1 child.  She has not breast-fed.  She is retired.  She denies of any breast pain nipple discharge nipple retraction or skin changes.  She is here today to discuss further workup and management with regard to newly diagnosed breast cancer          Review of Systems   Constitutional:  Negative for chills and fever.   HENT:  Negative for ear pain and sore throat.    Eyes:  Negative for pain and visual disturbance.   Respiratory:  Negative for cough and shortness of breath.    Cardiovascular:  Negative for chest pain and palpitations.   Gastrointestinal:  Negative for abdominal pain and vomiting.   Genitourinary:  Negative for dysuria and hematuria.   Musculoskeletal:  Negative for arthralgias and back pain.   Skin:  Negative for color change and rash.   Neurological:  Negative for seizures and syncope.   All other systems reviewed and are negative.       Past Medical History:      Patient  Active Problem List   Diagnosis    Mixed hyperlipidemia    Class 1 obesity due to excess calories without serious comorbidity in adult    Invasive lobular carcinoma of breast in female (HCC)        Past Medical History:   Diagnosis Date    Bradycardia 4/3/2020    Lightheaded 1/17/2022    Vertigo         Past Surgical History:   Procedure Laterality Date    MAMMO STEREOTACTIC BREAST BIOPSY RIGHT (ALL INC) Right 08/29/2024    MAMMO STEREOTACTIC BREAST BIOPSY RIGHT (ALL INC) Right 8/29/2024        Family History   Problem Relation Age of Onset    Dementia Mother     Heart attack Father     No Known Problems Brother     No Known Problems Brother     No Known Problems Son     Hyperlipidemia Son     No Known Problems Son     No Known Problems Daughter     No Known Problems Maternal Grandmother     No Known Problems Maternal Grandfather     No Known Problems Paternal Grandmother     No Known Problems Paternal Grandfather     Brain cancer Maternal Aunt     Coronary artery disease Maternal Uncle         Social History     Socioeconomic History    Marital status:      Spouse name: Not on file    Number of children: Not on file    Years of education: Not on file    Highest education level: Not on file   Occupational History    Not on file   Tobacco Use    Smoking status: Never    Smokeless tobacco: Never   Vaping Use    Vaping status: Never Used   Substance and Sexual Activity    Alcohol use: Not Currently     Comment: 1 per month    Drug use: Never    Sexual activity: Not Currently     Birth control/protection: Post-menopausal   Other Topics Concern    Not on file   Social History Narrative    Not on file     Social Determinants of Health     Financial Resource Strain: Low Risk  (8/28/2023)    Overall Financial Resource Strain (CARDIA)     Difficulty of Paying Living Expenses: Not hard at all   Food Insecurity: No Food Insecurity (9/2/2024)    Hunger Vital Sign     Worried About Running Out of Food in the Last Year:  Never true     Ran Out of Food in the Last Year: Never true   Transportation Needs: No Transportation Needs (9/2/2024)    PRAPARE - Transportation     Lack of Transportation (Medical): No     Lack of Transportation (Non-Medical): No   Physical Activity: Not on file   Stress: Not on file   Social Connections: Not on file   Intimate Partner Violence: Not on file   Housing Stability: Low Risk  (9/2/2024)    Housing Stability Vital Sign     Unable to Pay for Housing in the Last Year: No     Number of Times Moved in the Last Year: 0     Homeless in the Last Year: No        Current Outpatient Medications:     Calcium Ascorbate (VITAMIN C) 500 mg tablet, Take 500 mg by mouth daily, Disp: , Rfl:     Calcium Carb-Cholecalciferol (CALCIUM 1000 + D PO), Take 1 tablet by mouth daily, Disp: , Rfl:     Lysine 500 MG CAPS, Take 1 capsule by mouth daily, Disp: , Rfl:     Multiple Vitamin (MULTIVITAMIN ADULT PO), Take 1 tablet by mouth daily, Disp: , Rfl:     rosuvastatin (CRESTOR) 10 MG tablet, Take 1 tablet (10 mg total) by mouth daily, Disp: 100 tablet, Rfl: 1    Vitamin D, Cholecalciferol, 50 MCG (2000 UT) CAPS, Take 1 tablet by mouth daily, Disp: , Rfl:     vitamin E, tocopherol, 400 units capsule, Take 400 Units by mouth daily, Disp: , Rfl:      Allergies   Allergen Reactions    Atorvastatin Arthralgia       Physical Exam:     Vitals:    09/05/24 1352   BP: 125/78   Pulse: 76   SpO2: 94%     Physical Exam  Constitutional:       Appearance: Normal appearance.   HENT:      Head: Normocephalic and atraumatic.      Nose: Nose normal.      Mouth/Throat:      Mouth: Mucous membranes are moist.   Eyes:      Pupils: Pupils are equal, round, and reactive to light.   Cardiovascular:      Rate and Rhythm: Normal rate.      Pulses: Normal pulses.      Heart sounds: Normal heart sounds.   Pulmonary:      Effort: Pulmonary effort is normal.      Breath sounds: Normal breath sounds.   Chest:          Comments: Bilateral breast examination no  palpable mass masses nipple discharge nipple retraction or skin changes right axilla and supraclavicular examination no palpable adenopathy.  Patient was examined seated as well as supine position.  Abdominal:      General: Bowel sounds are normal.      Palpations: Abdomen is soft.   Musculoskeletal:         General: Normal range of motion.      Cervical back: Normal range of motion and neck supple.   Skin:     General: Skin is warm.   Neurological:      General: No focal deficit present.      Mental Status: She is alert and oriented to person, place, and time.   Psychiatric:         Mood and Affect: Mood normal.         Behavior: Behavior normal.         Thought Content: Thought content normal.         Judgment: Judgment normal.         Results:    Breast, Right, stereo specimen 1- 1:00- 6 cores all submitted:  Invasive lobular carcinoma  Favor histologic grade 1 (tubules: 3, nuclei: 2; mitosis: 1; score 6)                 Involves 2 out of 6 core tissue fragments               Greatest dimension 5 mm     DIAGNOSIS: Abnormal screening mammogram      TECHNIQUE:   Digital diagnostic mammography was performed. Computer Aided Detection (CAD) analyzed all applicable images.  Right breast ultrasound was performed.      COMPARISONS: Prior breast imaging dated: 07/19/2024, 07/17/2023, 07/14/2022, and 07/12/2021     RELEVANT HISTORY:   Family Breast Cancer History: No known family history of breast cancer.  Family Medical History: No known relevant family medical history.   Personal History: No known relevant hormone history. No known relevant surgical history. No known relevant medical history.     RISK ASSESSMENT:   5 Year Tyrer-Cuzick: 1.79%  10 Year Tyrer-Cuzick: 3.8%  Lifetime Tyrer-Cuzick: 5.1%     TISSUE DENSITY:   There are scattered areas of fibroglandular density.     INDICATION: Samaria Barger is a 74 y.o. female presenting for abnormal screening mammogram.     FINDINGS:   RIGHT  1) ARCHITECTURAL DISTORTION  [A]  Mammo diagnostic right w 3d & cad: There is architectural distortion seen in the upper inner quadrant of the inner region of the right breast in the posterior depth.   Right breast ultrasound: Targeted breast ultrasounds performed using dedicated high-resolution probe.  No sonographic correlate is identified for the area of architectural distortion in the upper inner right breast.     IMPRESSION:  Architectural distortion in the upper inner right breast.  Right tomographic guided core needle biopsy is recommended.       Findings and recommendations were discussed with the patient at the time of this exam.     ASSESSMENT/BI-RADS CATEGORY:  Right: 4 - Suspicious  Overall: 4 - Suspicious     RECOMMENDATION:       -Tomographic guided breast biopsy for the right breast.      DIAGNOSIS: Screening mammogram for breast cancer; Encounter for screening mammogram for malignant neoplasm of breast      TECHNIQUE:  Digital screening mammography was performed. Computer Aided Detection (CAD) analyzed all applicable images.   COMPARISONS: Prior breast imaging dated: 07/17/2023, 07/14/2022, and 07/12/2021     RELEVANT HISTORY:   Family Breast Cancer History: No known family history of breast cancer.  Family Medical History: No known relevant family medical history.   Personal History: No known relevant hormone history. No known relevant surgical history. No known relevant medical history.     The patient is scheduled in a reminder system for screening mammography.     8-10% of cancers will be missed on mammography. Management of a palpable abnormality must be based on clinical grounds.  Patients will be notified of their results via letter from our facility. Accredited by [employer] and FDA.     RISK ASSESSMENT:   5 Year Tyrer-Cuzick: 1.79%  10 Year Tyrer-Cuzick: 3.8%  Lifetime Tyrer-Cuzick: 5.1%     TISSUE DENSITY:   There are scattered areas of fibroglandular density.      INDICATION: Samaria Barger is a 74  y.o. female presenting for screening mammography.     FINDINGS:   RIGHT  1) ARCHITECTURAL DISTORTION [A]: There is questioned architectural distortion involving an asymmetry in the inner posterior right breast.  The asymmetry appears similar to prior mammograms; however, there is questioned distortion which appears slightly more prominent on this examination.     Left  There are no new suspicious masses, grouped microcalcifications or areas of unexplained architectural distortion. The skin and nipple areolar complex are unremarkable.          IMPRESSION:  Additional imaging required.  A breast health care nurse from our facility will be contacting the patient regarding the need for additional imaging.        ASSESSMENT/BI-RADS CATEGORY:  Left: 1 - Negative  Right: 0 - Incomplete: Needs Additional Imaging Evaluation  Overall: 0 - Incomplete: Needs Additional Imaging Evaluation     RECOMMENDATION:       - Diagnostic mammogram at the current time for the right breast.       - Ultrasound at the current time for the right breast.       - Routine screening mammogram in 1 year for the left breast.    PATHOLOGY RESULTS:   1) Architectural Distortion [A] (Right; Inner, Upper Inner; Posterior)  The pathology results indicate a Malignant finding with invasive lobular carcinoma and lobular carcinoma in situ (lcis).   Radiology and pathology are concordant.     In review of the patient’s recent imaging, the right malignancy appears unifocal. Suspicious architectural distortion cover an area of 1 cm.  Clip was displaced medially by 0.7 cm and inferiorly by 1 cm.      Ultrasound of the right axilla was not previously performed.  Recent imaging of the contralateral left breast dated July 19, 2024 was reviewed and shows no suspicious findings.      RECOMMENDATION:       - Surgical Consultation for the right breast.  Patient should also potentially undergo right axillary ultrasound as not previously performed.  Given the lobular  nature of the carcinoma, breast MRI should be considered.    Discussion/Summary:   Here we have very pleasant 74-year-old female with recent diagnosis of 1 cm invasive lobular carcinoma in the right breast clinically I do not appreciate any mass or masses.  Right axillary ultrasound ultrasound is pending.  But no palpable adenopathy.  We did discuss in detail breast cancer initiation promotion progression and further workup and management.  We will obtain MRI of the breast as recommended by radiologist.  She is claustrophobic we will order Ativan 0.5 mg to be taken prior to MRI.  We will also obtain genetic testing to delineate any breast cancer related gene mutations.  Given it is a lobular carcinoma invasive and as patient prefers to make sure no tumor anywhere else we will obtain a bone scan and CT chest abdomen pelvis.  All patient's questions are answered to her satisfaction.  We did discuss in disease status cancer treatment plan and cancer treatment goals in detail.  I did spend more than 70 minutes reviewing all the medical records pathology, reviewing the films, patient examination and counseling newly diagnosed breast cancer.  I will get all the studies done and see her within 2 weeks.    Advance Care Planning/Advance Directives:  I Sha Walden MD discussed the disease status, and treatment plans with Samaria Barger today 09/05/24 and will follow-up with the patient.

## 2024-09-06 ENCOUNTER — HOSPITAL ENCOUNTER (OUTPATIENT)
Dept: RADIOLOGY | Facility: MEDICAL CENTER | Age: 74
Discharge: HOME | End: 2024-09-06
Payer: MEDICARE

## 2024-09-06 ENCOUNTER — PATIENT OUTREACH (OUTPATIENT)
Dept: HEMATOLOGY ONCOLOGY | Facility: CLINIC | Age: 74
End: 2024-09-06

## 2024-09-06 DIAGNOSIS — C50.919 INVASIVE LOBULAR CARCINOMA OF BREAST IN FEMALE (HCC): ICD-10-CM

## 2024-09-06 PROCEDURE — 74177 CT ABD & PELVIS W/CONTRAST: CPT

## 2024-09-06 PROCEDURE — 71260 CT THORAX DX C+: CPT

## 2024-09-06 RX ADMIN — IOHEXOL 100 ML: 350 INJECTION, SOLUTION INTRAVENOUS at 13:24

## 2024-09-06 NOTE — PROGRESS NOTES
Patient Navigation attempted to outreach patient after Oncology consult to complete the Distress Thermometer.    A voicemail was left stating a reason for my call and my contact information was provided. I requested a return call at patient's earliest convenience.

## 2024-09-09 ENCOUNTER — PATIENT OUTREACH (OUTPATIENT)
Dept: HEMATOLOGY ONCOLOGY | Facility: CLINIC | Age: 74
End: 2024-09-09

## 2024-09-09 ENCOUNTER — OFFICE VISIT (OUTPATIENT)
Dept: INTERNAL MEDICINE CLINIC | Facility: CLINIC | Age: 74
End: 2024-09-09
Payer: MEDICARE

## 2024-09-09 ENCOUNTER — HOSPITAL ENCOUNTER (OUTPATIENT)
Dept: NUCLEAR MEDICINE | Facility: HOSPITAL | Age: 74
Discharge: HOME | End: 2024-09-09
Payer: MEDICARE

## 2024-09-09 VITALS
HEART RATE: 69 BPM | BODY MASS INDEX: 33.12 KG/M2 | WEIGHT: 198.8 LBS | OXYGEN SATURATION: 95 % | SYSTOLIC BLOOD PRESSURE: 118 MMHG | HEIGHT: 65 IN | DIASTOLIC BLOOD PRESSURE: 70 MMHG

## 2024-09-09 DIAGNOSIS — C50.919 INVASIVE LOBULAR CARCINOMA OF BREAST IN FEMALE (HCC): Primary | ICD-10-CM

## 2024-09-09 DIAGNOSIS — Z00.00 MEDICARE ANNUAL WELLNESS VISIT, SUBSEQUENT: ICD-10-CM

## 2024-09-09 DIAGNOSIS — E78.2 MIXED HYPERLIPIDEMIA: ICD-10-CM

## 2024-09-09 DIAGNOSIS — C50.919 INVASIVE LOBULAR CARCINOMA OF BREAST IN FEMALE (HCC): ICD-10-CM

## 2024-09-09 PROCEDURE — A9503 TC99M MEDRONATE: HCPCS

## 2024-09-09 PROCEDURE — 78306 BONE IMAGING WHOLE BODY: CPT

## 2024-09-09 PROCEDURE — 99214 OFFICE O/P EST MOD 30 MIN: CPT | Performed by: INTERNAL MEDICINE

## 2024-09-09 PROCEDURE — G0439 PPPS, SUBSEQ VISIT: HCPCS | Performed by: INTERNAL MEDICINE

## 2024-09-09 NOTE — ASSESSMENT & PLAN NOTE
Seen pn screening mammogram  MRI scheduled-extremely claustrophobic so has Ativan to take prior but unsure if it will help  CT scan reviewed and needs f/u CT of the chest and abdomen in 3months

## 2024-09-09 NOTE — PROGRESS NOTES
Ambulatory Visit  Name: Samaria Barger      : 1950      MRN: [id number]  Encounter Provider: Lea Reyes, MD  Encounter Date: 2024   Enc[facility]: [facility]    Assessment & Plan   1. Invasive lobular carcinoma of breast in female (HCC)  Assessment & Plan:  Seen pn screening mammogram  MRI scheduled-extremely claustrophobic so has Ativan to take prior but unsure if it will help  CT scan reviewed and needs f/u CT of the chest and abdomen in 3months  2. Medicare annual wellness visit, subsequent  3. Mixed hyperlipidemia  Assessment & Plan:  Controlled on rosuvastatin     Preventive health issues were discussed with patient, and age appropriate screening tests were ordered as noted in patient's After Visit Summary. Personalized health advice and appropriate referrals for health education or preventive services given if needed, as noted in patient's After Visit Summary.  Discussed vaccines and will check with her surgeon re: getting COVID RSV and pneumococcal     History of Present Illness     HPI   Patient Care Team:  Lea Reyes, MD as PCP - General (Internal Medicine)  Aislinn Garcia MA (Care Coordination)  Sha Walden MD (Surgical Oncology)    Review of Systems   Constitutional:  Negative for unexpected weight change.   Respiratory:  Negative for shortness of breath.    Cardiovascular:  Negative for chest pain, palpitations and leg swelling.   Gastrointestinal:  Negative for abdominal pain, constipation and diarrhea.   Genitourinary:  Negative for difficulty urinating and dysuria.   Neurological:  Negative for dizziness and headaches.     Medical History Reviewed by provider this encounter:  Tobacco  Allergies  Meds  Problems  Med Hx  Surg Hx  Fam Hx       Annual Wellness Visit Questionnaire   Samaria is here for her Subsequent Wellness visit.     Health Risk Assessment:   Patient rates overall health as very good. Patient feels that their physical  health rating is same. Patient is satisfied with their life. Eyesight was rated as same. Hearing was rated as same. Patient feels that their emotional and mental health rating is same. Patients states they are never, rarely angry. Patient states they are never, rarely unusually tired/fatigued. Pain experienced in the last 7 days has been none. Patient states that she has experienced no weight loss or gain in last 6 months.     Depression Screening:   PHQ-2 Score: 0      Fall Risk Screening:   In the past year, patient has experienced: no history of falling in past year      Urinary Incontinence Screening:   Patient has not leaked urine accidently in the last six months.     Home Safety:  Patient does not have trouble with stairs inside or outside of their home. Patient has working smoke alarms and has working carbon monoxide detector. Home safety hazards include: none.     Nutrition:   Current diet is Regular.     Medications:   Patient is not currently taking any over-the-counter supplements. Patient is able to manage medications.     Activities of Daily Living (ADLs)/Instrumental Activities of Daily Living (IADLs):   Walk and transfer into and out of bed and chair?: Yes  Dress and groom yourself?: Yes    Bathe or shower yourself?: Yes    Feed yourself? Yes  Do your laundry/housekeeping?: Yes  Manage your money, pay your bills and track your expenses?: Yes  Make your own meals?: Yes    Do your own shopping?: Yes    Previous Hospitalizations:   Any hospitalizations or ED visits within the last 12 months?: No      Advance Care Planning:   Living will: Yes    Durable POA for healthcare: Yes    Advanced directive: Yes      Cognitive Screening:   Provider or family/friend/caregiver concerned regarding cognition?: No    PREVENTIVE SCREENINGS      Cardiovascular Screening:    General: Screening Not Indicated and History Lipid Disorder      Diabetes Screening:     General: Screening Current      Colorectal Cancer  Screening:     General: Screening Current      Breast Cancer Screening:     General: History Breast Cancer      Cervical Cancer Screening:    General: Screening Not Indicated      Abdominal Aortic Aneurysm (AAA) Screening:        General: Screening Not Indicated      Lung Cancer Screening:     General: Screening Not Indicated      Hepatitis C Screening:    General: Screening Current    Screening, Brief Intervention, and Referral to Treatment (SBIRT)    Screening  Typical number of drinks in a day: 0  Typical number of drinks in a week: 0  Interpretation: Low risk drinking behavior.    AUDIT-C Screenin) How often did you have a drink containing alcohol in the past year? never  2) How many drinks did you have on a typical day when you were drinking in the past year? 0  3) How often did you have 6 or more drinks on one occasion in the past year? never    AUDIT-C Score: 0  Interpretation: Score 0-2 (female): Negative screen for alcohol misuse    Single Item Drug Screening:  How often have you used an illegal drug (including marijuana) or a prescription medication for non-medical reasons in the past year? never    Single Item Drug Screen Score: 0  Interpretation: Negative screen for possible drug use disorder    Social Determinants of Health     Financial Resource Strain: Low Risk  (2023)    Overall Financial Resource Strain (CARDIA)    • Difficulty of Paying Living Expenses: Not hard at all   Food Insecurity: No Food Insecurity (2024)    Hunger Vital Sign    • Worried About Running Out of Food in the Last Year: Never true    • Ran Out of Food in the Last Year: Never true   Transportation Needs: No Transportation Needs (2024)    PRAPARE - Transportation    • Lack of Transportation (Medical): No    • Lack of Transportation (Non-Medical): No   Housing Stability: Low Risk  (2024)    Housing Stability Vital Sign    • Unable to Pay for Housing in the Last Year: No    • Number of Times Moved in the Last  "Year: 0    • Homeless in the Last Year: No   Utilities: Not At Risk (9/2/2024)    [facility] Utilities    • Threatened with loss of utilities: No     No results found.    Objective     /70 (BP Location: Left arm, Patient Position: Sitting, Cuff Size: Large)   Pulse 69   Ht 5' 5\" (1.651 m)   Wt 90.2 kg (198 lb 12.8 oz)   SpO2 95%   BMI 33.08 kg/m²     Physical Exam  Constitutional:       General: She is not in acute distress.     Appearance: She is well-developed. She is not ill-appearing, toxic-appearing or diaphoretic.   Eyes:      Conjunctiva/sclera: Conjunctivae normal.   Cardiovascular:      Rate and Rhythm: Normal rate and regular rhythm.      Heart sounds: Normal heart sounds. No murmur heard.  Pulmonary:      Effort: Pulmonary effort is normal. No respiratory distress.      Breath sounds: Normal breath sounds. No stridor. No wheezing or rales.   Abdominal:      General: There is no distension.      Palpations: Abdomen is soft. There is no mass.      Tenderness: There is no abdominal tenderness. There is no guarding or rebound.   Musculoskeletal:      Cervical back: Neck supple.      Right lower leg: No edema.      Left lower leg: No edema.   Neurological:      Mental Status: She is alert and oriented to person, place, and time.   Psychiatric:         Mood and Affect: Mood normal.         Behavior: Behavior normal.         Thought Content: Thought content normal.         Judgment: Judgment normal.           "

## 2024-09-09 NOTE — PROGRESS NOTES
I reached out and spoke with Samaria now that consult has been completed with Oncology to complete the Distress Thermometer, review for any barriers to care and offer supportive services as needed. I reviewed and updated the members assigned to the care team in [facility].   She knows the members of the care team as well as how and when to contact them with any needs.   She verbalizes managing the schedules well.   She is currently able to drive and denies any transportation needs.    She denies any uncontrolled symptoms. Discussed role of Palliative Care in symptom and side effect management. Declined referral at this time.  Patients states that she is eating and drinking as per usual with no unintentional weight loss.     Patient does not smoke.   Patient states she is well supported by family and friends.    Pt states that she is currently only worrried about having her MRI done because she is claustrophobic but was given Ativan to take prior  Patient feels she has adequate insurance coverage and denies any financial concerns at this time.     Based on individual needs I will follow up in about 4-6 weeks.   I have provided my direct contact information and welcome them to contact me if their needs as discussed above change. They were appreciative for the call.

## 2024-09-09 NOTE — PATIENT INSTRUCTIONS
Medicare Preventive Visit Patient Instructions  Thank you for completing your Welcome to Medicare Visit or Medicare Annual Wellness Visit today. Your next wellness visit will be due in one year (9/10/2025).  The screening/preventive services that you may require over the next 5-10 years are detailed below. Some tests may not apply to you based off risk factors and/or age. Screening tests ordered at today's visit but not completed yet may show as past due. Also, please note that scanned in results may not display below.  Preventive Screenings:  Service Recommendations Previous Testing/Comments   Colorectal Cancer Screening  * Colonoscopy    * Fecal Occult Blood Test (FOBT)/Fecal Immunochemical Test (FIT)  * Fecal DNA/Cologuard Test  * Flexible Sigmoidoscopy Age: 45-75 years old   Colonoscopy: every 10 years (may be performed more frequently if at higher risk)  OR  FOBT/FIT: every 1 year  OR  Cologuard: every 3 years  OR  Sigmoidoscopy: every 5 years  Screening may be recommended earlier than age 45 if at higher risk for colorectal cancer. Also, an individualized decision between you and your healthcare provider will decide whether screening between the ages of 76-85 would be appropriate. Colonoscopy: Not on file  FOBT/FIT: Not on file  Cologuard: 09/22/2023  Sigmoidoscopy: Not on file          Breast Cancer Screening Age: 40+ years old  Frequency: every 1-2 years  Not required if history of left and right mastectomy Mammogram: 07/19/2024        Cervical Cancer Screening Between the ages of 21-29, pap smear recommended once every 3 years.   Between the ages of 30-65, can perform pap smear with HPV co-testing every 5 years.   Recommendations may differ for women with a history of total hysterectomy, cervical cancer, or abnormal pap smears in past. Pap Smear: Not on file        Hepatitis C Screening Once for adults born between 1945 and 1965  More frequently in patients at high risk for Hepatitis C Hep C Antibody:  07/27/2020        Diabetes Screening 1-2 times per year if you're at risk for diabetes or have pre-diabetes Fasting glucose: 88 mg/dL (8/26/2024)  A1C: No results in last 5 years (No results in last 5 years)      Cholesterol Screening Once every 5 years if you don't have a lipid disorder. May order more often based on risk factors. Lipid panel: 08/26/2024          Other Preventive Screenings Covered by Medicare:  Abdominal Aortic Aneurysm (AAA) Screening: covered once if your at risk. You're considered to be at risk if you have a family history of AAA.  Lung Cancer Screening: covers low dose CT scan once per year if you meet all of the following conditions: (1) Age 55-77; (2) No signs or symptoms of lung cancer; (3) Current smoker or have quit smoking within the last 15 years; (4) You have a tobacco smoking history of at least 20 pack years (packs per day multiplied by number of years you smoked); (5) You get a written order from a healthcare provider.  Glaucoma Screening: covered annually if you're considered high risk: (1) You have diabetes OR (2) Family history of glaucoma OR (3)  aged 50 and older OR (4)  American aged 65 and older  Osteoporosis Screening: covered every 2 years if you meet one of the following conditions: (1) You're estrogen deficient and at risk for osteoporosis based off medical history and other findings; (2) Have a vertebral abnormality; (3) On glucocorticoid therapy for more than 3 months; (4) Have primary hyperparathyroidism; (5) On osteoporosis medications and need to assess response to drug therapy.   Last bone density test (DXA Scan): 11/13/2023.  HIV Screening: covered annually if you're between the age of 15-65. Also covered annually if you are younger than 15 and older than 65 with risk factors for HIV infection. For pregnant patients, it is covered up to 3 times per pregnancy.    Immunizations:  Immunization Recommendations   Influenza Vaccine Annual  influenza vaccination during flu season is recommended for all persons aged >= 6 months who do not have contraindications   Pneumococcal Vaccine   * Pneumococcal conjugate vaccine = PCV13 (Prevnar 13), PCV15 (Vaxneuvance), PCV20 (Prevnar 20)  * Pneumococcal polysaccharide vaccine = PPSV23 (Pneumovax) Adults 19-65 yo with certain risk factors or if 65+ yo  If never received any pneumonia vaccine: recommend Prevnar 20 (PCV20)  Give PCV20 if previously received 1 dose of PCV13 or PPSV23   Hepatitis B Vaccine 3 dose series if at intermediate or high risk (ex: diabetes, end stage renal disease, liver disease)   Respiratory syncytial virus (RSV) Vaccine - COVERED BY MEDICARE PART D  * RSVPreF3 (Arexvy) CDC recommends that adults 60 years of age and older may receive a single dose of RSV vaccine using shared clinical decision-making (SCDM)   Tetanus (Td) Vaccine - COST NOT COVERED BY MEDICARE PART B Following completion of primary series, a booster dose should be given every 10 years to maintain immunity against tetanus. Td may also be given as tetanus wound prophylaxis.   Tdap Vaccine - COST NOT COVERED BY MEDICARE PART B Recommended at least once for all adults. For pregnant patients, recommended with each pregnancy.   Shingles Vaccine (Shingrix) - COST NOT COVERED BY MEDICARE PART B  2 shot series recommended in those 19 years and older who have or will have weakened immune systems or those 50 years and older     Health Maintenance Due:      Topic Date Due   • Breast Cancer Screening: Mammogram  07/19/2025   • Colorectal Cancer Screening  09/22/2026   • Hepatitis C Screening  Completed     Immunizations Due:      Topic Date Due   • COVID-19 Vaccine (6 - 2023-24 season) 09/01/2024   • Influenza Vaccine (1) 09/01/2024     Advance Directives   What are advance directives?  Advance directives are legal documents that state your wishes and plans for medical care. These plans are made ahead of time in case you lose your  ability to make decisions for yourself. Advance directives can apply to any medical decision, such as the treatments you want, and if you want to donate organs.   What are the types of advance directives?  There are many types of advance directives, and each state has rules about how to use them. You may choose a combination of any of the following:  Living will:  This is a written record of the treatment you want. You can also choose which treatments you do not want, which to limit, and which to stop at a certain time. This includes surgery, medicine, IV fluid, and tube feedings.   Durable power of  for healthcare (DPAHC):  This is a written record that states who you want to make healthcare choices for you when you are unable to make them for yourself. This person, called a proxy, is usually a family member or a friend. You may choose more than 1 proxy.  Do not resuscitate (DNR) order:  A DNR order is used in case your heart stops beating or you stop breathing. It is a request not to have certain forms of treatment, such as CPR. A DNR order may be included in other types of advance directives.  Medical directive:  This covers the care that you want if you are in a coma, near death, or unable to make decisions for yourself. You can list the treatments you want for each condition. Treatment may include pain medicine, surgery, blood transfusions, dialysis, IV or tube feedings, and a ventilator (breathing machine).  Values history:  This document has questions about your views, beliefs, and how you feel and think about life. This information can help others choose the care that you would choose.  Why are advance directives important?  An advance directive helps you control your care. Although spoken wishes may be used, it is better to have your wishes written down. Spoken wishes can be misunderstood, or not followed. Treatments may be given even if you do not want them. An advance directive may make it easier  for your family to make difficult choices about your care.   Weight Management   Why it is important to manage your weight:  Being overweight increases your risk of health conditions such as heart disease, high blood pressure, type 2 diabetes, and certain types of cancer. It can also increase your risk for osteoarthritis, sleep apnea, and other respiratory problems. Aim for a slow, steady weight loss. Even a small amount of weight loss can lower your risk of health problems.  How to lose weight safely:  A safe and healthy way to lose weight is to eat fewer calories and get regular exercise. You can lose up about 1 pound a week by decreasing the number of calories you eat by 500 calories each day.   Healthy meal plan for weight management:  A healthy meal plan includes a variety of foods, contains fewer calories, and helps you stay healthy. A healthy meal plan includes the following:  Eat whole-grain foods more often.  A healthy meal plan should contain fiber. Fiber is the part of grains, fruits, and vegetables that is not broken down by your body. Whole-grain foods are healthy and provide extra fiber in your diet. Some examples of whole-grain foods are whole-wheat breads and pastas, oatmeal, brown rice, and bulgur.  Eat a variety of vegetables every day.  Include dark, leafy greens such as spinach, kale, camille greens, and mustard greens. Eat yellow and orange vegetables such as carrots, sweet potatoes, and winter squash.   Eat a variety of fruits every day.  Choose fresh or canned fruit (canned in its own juice or light syrup) instead of juice. Fruit juice has very little or no fiber.  Eat low-fat dairy foods.  Drink fat-free (skim) milk or 1% milk. Eat fat-free yogurt and low-fat cottage cheese. Try low-fat cheeses such as mozzarella and other reduced-fat cheeses.  Choose meat and other protein foods that are low in fat.  Choose beans or other legumes such as split peas or lentils. Choose fish, skinless poultry  (chicken or turkey), or lean cuts of red meat (beef or pork). Before you cook meat or poultry, cut off any visible fat.   Use less fat and oil.  Try baking foods instead of frying them. Add less fat, such as margarine, sour cream, regular salad dressing and mayonnaise to foods. Eat fewer high-fat foods. Some examples of high-fat foods include french fries, doughnuts, ice cream, and cakes.  Eat fewer sweets.  Limit foods and drinks that are high in sugar. This includes candy, cookies, regular soda, and sweetened drinks.  Exercise:  Exercise at least 30 minutes per day on most days of the week. Some examples of exercise include walking, biking, dancing, and swimming. You can also fit in more physical activity by taking the stairs instead of the elevator or parking farther away from stores. Ask your healthcare provider about the best exercise plan for you.      © Copyright [employer] 2018 Information is for End User's use only and may not be sold, redistributed or otherwise used for commercial purposes. All illustrations and images included in CareNotes® are the copyrighted property of [employer]M., Inc[employer] or [employer]

## 2024-09-10 ENCOUNTER — HOSPITAL ENCOUNTER (OUTPATIENT)
Dept: RADIOLOGY | Facility: HOSPITAL | Age: 74
Discharge: HOME | End: 2024-09-10
Attending: SURGERY
Payer: MEDICARE

## 2024-09-10 DIAGNOSIS — C50.919 INVASIVE LOBULAR CARCINOMA OF BREAST IN FEMALE (HCC): ICD-10-CM

## 2024-09-10 PROCEDURE — A9585 GADOBUTROL INJECTION: HCPCS | Performed by: SURGERY

## 2024-09-10 PROCEDURE — C8937 CAD BREAST MRI: HCPCS

## 2024-09-10 PROCEDURE — C8908 MRI W/O FOL W/CONT, BREAST,: HCPCS

## 2024-09-10 RX ORDER — GADOBUTROL 604.72 MG/ML
9 INJECTION INTRAVENOUS
Status: COMPLETED | OUTPATIENT
Start: 2024-09-10 | End: 2024-09-10

## 2024-09-10 RX ADMIN — GADOBUTROL 9 ML: 604.72 INJECTION INTRAVENOUS at 16:14

## 2024-09-12 ENCOUNTER — TELEPHONE (OUTPATIENT)
Age: 74
End: 2024-09-12

## 2024-09-12 ENCOUNTER — TELEPHONE (OUTPATIENT)
Dept: SURGICAL ONCOLOGY | Facility: CLINIC | Age: 74
End: 2024-09-12

## 2024-09-12 DIAGNOSIS — C50.919 INVASIVE LOBULAR CARCINOMA OF BREAST IN FEMALE (HCC): Primary | ICD-10-CM

## 2024-09-12 NOTE — TELEPHONE ENCOUNTER
Called patient, reviewed need for mri guided biopsy. Patient stated she is claustrophobic and may need medication to get her through biopsy. Advised Dr. Walden could refill but will wait to do so until biopsy is scheduled. Patient advised she has a f/u scheduled with dr. Walden on Wednesday advised to keep that appointment until biopsy is scheduled. I will then have staff reach out to her to r/s her appointment for about a week after biopsy for results. Patient stated understanding. Patient also aware that if anti-anxiety meds are given then patient will need a ride to her biopsy appointment. Patient stated understanding. All questions answered at this time. Patient appreciative.

## 2024-09-12 NOTE — TELEPHONE ENCOUNTER
Samaria called in stating she is trying to schedule her US breast, [facility] and they were supposed to call her back. She said she has not heard back from them.    [facility] and spoke with Louise who placed me on hold to get a status update from the nurses there. She told me the nurses have been messaging Janis CHACON in regards to this matter and they would be contacting the patient to schedule once they heard back from Janis.    This information was relayed to patient.    She is concerned that this will not be completed in time for her follow up on 9/18/24. Please call her if this follow up needs to be rescheduled.

## 2024-09-13 ENCOUNTER — PATIENT OUTREACH (OUTPATIENT)
Dept: CASE MANAGEMENT | Facility: HOSPITAL | Age: 74
End: 2024-09-13

## 2024-09-13 NOTE — PROGRESS NOTES
Biopsychosocial and Barriers Assessment    Type of Cancer: breast  Treatment plan: TBD  Noted barriers to care: none  Cultural/[employer]ist concerns: none noted  Hair Loss/ Wig resources needed: not discussed today    DT completed: 9/9  DT score: 5/10  Issues noted: anxiety    Marital status/Lives with: , lives with her son and his family  Pt's support system: family  Mental Health history: anxiety d/t claustrophobia  Substance Abuse: none noted    Employment/income source: retired   Concerns with bills (treatment vs household): none noted at this time  Noted issues with home: none noted    Narrative note:      MSW s/w pt by phone this morning after receiving a referral from her PN.  DT was completed and indicated anxiety, however pt confirmed for me today on the phone that this is related to all of the testing she is having done right now and feeling claustrophobic.  She had an MRI earlier this week and tells me that it went much better than she anticipated.  She feels this is due to taking anxiety medication proactively, not being fully inside of the machine, and being able to lay on her side.  She is very complimentary of the two techs that were working with her that day.  She has additional testing that she anticipates she will get called to schedule today, and she has many questions about the process and if she will feel claustrophobic or not.  Dr. Walden's RN notes that he is willing to reorder her the anxiety medication if needed for additional testing.    At this point, pt denies any other needs.  She says that she is trying not to get too far ahead of herself with thinking about her treatment options and is just focusing on the day to day at this time.  She took my direct number and agreed to call me after her next appt if she felt that she wanted to talk, needed support, or had any other questions, concerns, or needs.  Pt noted that she was very appreciative of the outreach this morning and thanked me  for the call.  I will remain available to her as needed moving forward.

## 2024-09-16 ENCOUNTER — TELEPHONE (OUTPATIENT)
Age: 74
End: 2024-09-16

## 2024-09-16 DIAGNOSIS — C50.919 INVASIVE LOBULAR CARCINOMA OF BREAST IN FEMALE (HCC): ICD-10-CM

## 2024-09-16 NOTE — TELEPHONE ENCOUNTER
Called patient back to discuss that she should be receiving a call within the next few days regarding her MRI biopsy and other imaging in order to schedule. Also dicussed that appointment scheduled for Wednesday will be pushed back until we know what day her MRI biopsy will be completed.    Patient thankful for the information. No further questions at this time.

## 2024-09-16 NOTE — TELEPHONE ENCOUNTER
Patient had a MRI 9/10/24 & they stated that she needed further testing.  She wants to know when someone will call her to set up that testing.  Someone told her that she would hear from someone by Friday, but she didn't & she's getting anxious.  Dr Walden also wants her to have DARRON clip.    She said that she also needs orders for US right brest

## 2024-09-17 ENCOUNTER — TELEPHONE (OUTPATIENT)
Dept: CARDIAC SURGERY | Facility: CLINIC | Age: 74
End: 2024-09-17

## 2024-09-17 ENCOUNTER — TELEPHONE (OUTPATIENT)
Dept: GENETICS | Facility: CLINIC | Age: 74
End: 2024-09-17

## 2024-09-17 PROBLEM — C50.211 MALIGNANT NEOPLASM OF UPPER-INNER QUADRANT OF RIGHT FEMALE BREAST (HCC): Status: ACTIVE | Noted: 2024-09-04

## 2024-09-17 PROBLEM — Z17.0 MALIGNANT NEOPLASM OF UPPER-INNER QUADRANT OF RIGHT BREAST IN FEMALE, ESTROGEN RECEPTOR POSITIVE (HCC): Status: ACTIVE | Noted: 2024-09-04

## 2024-09-17 RX ORDER — LORAZEPAM 0.5 MG/1
TABLET ORAL
Qty: 2 TABLET | Refills: 0 | Status: SHIPPED | OUTPATIENT
Start: 2024-09-17

## 2024-09-17 NOTE — TELEPHONE ENCOUNTER
Genetic Test Result Note    Summary:    Result Disclosure:   Today I spoke with Samaria over the phone to review the results of her genetic test for hereditary cancer. She underwent genetic testing through our program on 9/4/2024 due to her recent diagnosis of breast cancer.    Test Performed: [employer] BRCAplus STAT Panel (13 genes): TEREZA, BARD1, BRCA1, BRCA2, CDH1, CHEK2, NF1, PALB2, PTEN, RAD51C, RAD51D, STK11, TP53 with reflex to [facility]: Cancer+RNAinsight (59 genes): APC, TEREZA, AXIN2, BAP1, BARD1, BMPR1A, BRCA1, BRCA2, BRIP1, CDH1, CDK4, CDKN1B, CDKN2A, CHEK2, CTNNA1, DICER1, EGLN1, EPCAM, FH, FLCN, GREM1, HOXB13, KIF1B, KIT, MAX, MEN1, MET, MITF, MLH1, MSH2, MSH3, MSH6, MUTYH, NF1, NTHL1, PALB2, PDGFRA PMS2, POLD1, POLE, POT1, PTEN, RAD51C, RAD51D, RB1, RET, SDHA, SDHAF2, SDHB, SDHC, SDHD, SMAD4, SMARCA4, STK11, [id number], TP53, TSC1, TSC2, VHL     Result: Negative - No Clinically Significant Variants Detected     Assessment:   A negative result significantly reduces the likelihood that Samaria has a hereditary cancer syndrome. However, this testing is unable to completely rule out the presence of hereditary cancer. It remains possible that:  There is a variant in an area of a gene which was not tested or there is a variant not detectable due to technical limitations of this test.     There is a variant in another gene that was not included in this test or in a gene not known to be linked to cancer or tumors.   A family member has a genetic variant that the patient did not inherit.   The cancer in the family is sporadic and is related to non-hereditary factors.     Risks and Testing for Family Members:  Samaria was made aware that all of her first-degree relatives are at increased risk to develop breast cancer based on her recent diagnosis. We recommend that her first-degree relatives make their healthcare providers aware of the family history and discuss their options for screening and risk-reduction.    At  this time we do not recommend testing for Samaria 's children based on her negative test result.  Samaria 's children still need to consider the history of cancer on the other side of their family when determining their risks.     If Samaria has any affected family members with a cancer diagnosis, especially at a young age, they may still consider genetic testing. Relatives who wish to pursue genetic testing can reach out to the Cancer Risk and Genetics Program at [phone] to schedule an appointment or visit [email or web address] to identify a local genetic counselor.         Plan:     Negative Result: This result does not have surgical implications and surgical options should be discussed with her healthcare provider. Samaria's breast surgeon, Dr. Walden will be made aware of her results

## 2024-09-17 NOTE — TELEPHONE ENCOUNTER
----- Message from Janis HOLLAND sent at 9/16/2024  4:12 PM EDT -----  Please call this patient and change appointment with dr. Campa to about 1 week after 9/27 (when MRI biopsies are scheduled). Please let her know I sent ativan to dr. campa for him to sign d/t her claustrophobia. Please tell her not to take it until she signs consent for the biopsies. Once she has signed consent then she can take the 1 and take the 2nd one if needed. Please tell her she will need a  for the procedure due to the ativan. Let me know if she has additional questions,  Thanks

## 2024-09-17 NOTE — TELEPHONE ENCOUNTER
Called patient and changed her follow up appointment with Dr. Walden to 10/7/24 at 1:45.  She was given the instruction regarding the Ativan. Janis was notified.

## 2024-09-27 ENCOUNTER — HOSPITAL ENCOUNTER (OUTPATIENT)
Dept: RADIOLOGY | Facility: HOSPITAL | Age: 74
Discharge: HOME | End: 2024-09-27
Attending: SURGERY
Payer: MEDICARE

## 2024-09-27 DIAGNOSIS — C50.919 INVASIVE LOBULAR CARCINOMA OF BREAST IN FEMALE (HCC): ICD-10-CM

## 2024-09-27 PROCEDURE — A4648 IMPLANTABLE TISSUE MARKER: HCPCS

## 2024-09-27 PROCEDURE — 88360 TUMOR IMMUNOHISTOCHEM/MANUAL: CPT | Performed by: PATHOLOGY

## 2024-09-27 PROCEDURE — A9585 GADOBUTROL INJECTION: HCPCS | Performed by: SURGERY

## 2024-09-27 PROCEDURE — 88305 TISSUE EXAM BY PATHOLOGIST: CPT | Performed by: PATHOLOGY

## 2024-09-27 PROCEDURE — 88342 IMHCHEM/IMCYTCHM 1ST ANTB: CPT | Performed by: PATHOLOGY

## 2024-09-27 PROCEDURE — 19085 BX BREAST 1ST LESION MR IMAG: CPT

## 2024-09-27 PROCEDURE — 88341 IMHCHEM/IMCYTCHM EA ADD ANTB: CPT | Performed by: PATHOLOGY

## 2024-09-27 RX ORDER — GADOBUTROL 604.72 MG/ML
9 INJECTION INTRAVENOUS
Status: COMPLETED | OUTPATIENT
Start: 2024-09-27 | End: 2024-09-27

## 2024-09-27 RX ORDER — LIDOCAINE HYDROCHLORIDE 10 MG/ML
5 INJECTION, SOLUTION EPIDURAL; INFILTRATION; INTRACAUDAL; PERINEURAL ONCE
Status: COMPLETED | OUTPATIENT
Start: 2024-09-27 | End: 2024-09-27

## 2024-09-27 RX ORDER — LIDOCAINE HYDROCHLORIDE AND EPINEPHRINE BITARTRATE 20; .01 MG/ML; MG/ML
20 INJECTION, SOLUTION SUBCUTANEOUS ONCE
Status: COMPLETED | OUTPATIENT
Start: 2024-09-27 | End: 2024-09-27

## 2024-09-27 RX ADMIN — GADOBUTROL 9 ML: 604.72 INJECTION INTRAVENOUS at 08:25

## 2024-09-27 RX ADMIN — LIDOCAINE HYDROCHLORIDE,EPINEPHRINE BITARTRATE 20 ML: 20; .01 INJECTION, SOLUTION INFILTRATION; PERINEURAL at 08:43

## 2024-09-27 RX ADMIN — LIDOCAINE HYDROCHLORIDE 5 ML: 10 INJECTION, SOLUTION EPIDURAL; INFILTRATION; INTRACAUDAL; PERINEURAL at 08:43

## 2024-10-01 PROCEDURE — 88342 IMHCHEM/IMCYTCHM 1ST ANTB: CPT | Performed by: PATHOLOGY

## 2024-10-01 PROCEDURE — 88305 TISSUE EXAM BY PATHOLOGIST: CPT | Performed by: PATHOLOGY

## 2024-10-01 PROCEDURE — 88360 TUMOR IMMUNOHISTOCHEM/MANUAL: CPT | Performed by: PATHOLOGY

## 2024-10-01 PROCEDURE — 88341 IMHCHEM/IMCYTCHM EA ADD ANTB: CPT | Performed by: PATHOLOGY

## 2024-10-03 ENCOUNTER — PATIENT OUTREACH (OUTPATIENT)
Dept: HEMATOLOGY ONCOLOGY | Facility: CLINIC | Age: 74
End: 2024-10-03

## 2024-10-03 NOTE — PROGRESS NOTES
"Patient called in.  States she had an MRI guided breast biopsy on 9/27/24.  [facility]  Thought the radiologist who performed the procedure was going to call her with the results.  Patient states she is awaiting a phone call.   Per the notes from the addendum 10/2/24, it states \"The above findings will be discussed with the patient by her breast surgeon, Dr. Walden. \"  Made patient aware.  Patient states she has a follow up appointment on 10/7/24 with Dr Walden.  States she will discuss with him at that time.  Offered to have someone call her sooner but patient declined.      Patient did want to discuss her negative experience with the biopsy on 9/27.  Provided the phone number for Patient Experience.  Patient states she does not want to complain, but does not want others to have the negative experience she had.  Support offered throughout the conversation.  Patient knows to reach out with further questions.  "

## 2024-10-07 ENCOUNTER — OFFICE VISIT (OUTPATIENT)
Dept: SURGICAL ONCOLOGY | Facility: CLINIC | Age: 74
End: 2024-10-07
Payer: MEDICARE

## 2024-10-07 VITALS
HEIGHT: 65 IN | SYSTOLIC BLOOD PRESSURE: 156 MMHG | BODY MASS INDEX: 33.41 KG/M2 | OXYGEN SATURATION: 97 % | DIASTOLIC BLOOD PRESSURE: 88 MMHG | WEIGHT: 200.5 LBS | TEMPERATURE: 98 F | HEART RATE: 68 BPM

## 2024-10-07 DIAGNOSIS — Z01.818 PRE-OP EXAMINATION: ICD-10-CM

## 2024-10-07 DIAGNOSIS — C50.211 MALIGNANT NEOPLASM OF UPPER-INNER QUADRANT OF RIGHT BREAST IN FEMALE, ESTROGEN RECEPTOR POSITIVE (HCC): Primary | ICD-10-CM

## 2024-10-07 DIAGNOSIS — Z17.0 MALIGNANT NEOPLASM OF UPPER-INNER QUADRANT OF RIGHT BREAST IN FEMALE, ESTROGEN RECEPTOR POSITIVE (HCC): Primary | ICD-10-CM

## 2024-10-07 PROCEDURE — 99215 OFFICE O/P EST HI 40 MIN: CPT | Performed by: SURGERY

## 2024-10-07 NOTE — PROGRESS NOTES
Surgical Oncology Follow Up  [facility]  [facility] [address] [address]    Samarai Barger  1950  [id number]      Chief Complaint   Patient presents with   • Follow-up     2 Week Follow up - MRI        Assessment & Plan:   This is a 74-year-old female with right breast multifocal multicentric breast cancer s/p staging workup.  CT chest abdomen pelvis were reviewed and options to obtain an MRI now versus repeat CT abdomen in 3 months were also reviewed and discussed.  Patient and her son prefers to wait and obtain CT abdomen with contrast in 3 months.  And I think it is a very reasonable as she has gone through so many test recently.  We also reviewed the bone scan and no evidence of distant disease.  Surgical options were reviewed and discussed.  Surgical consent was obtained for right mastectomy with dual tracer technique sentinel lymph node biopsy and possible axillary dissection and left total mastectomy after explaining benefit procedure alternative and possible complications.  She was also referred to plastic surgery to discuss the reconstruction options.  All patient's and her son's questions were answered to their satisfaction    Cancer History:     Oncology History   Malignant neoplasm of upper-inner quadrant of right breast in female, estrogen receptor positive (HCC)   8/29/2024 Biopsy    Right breast stereotactic biopsy  1 o'clock  Invasive lobular carcinoma  Histologic grade 1   ER 90  CA 60  HER2 0    The pathology results indicate a Malignant finding with invasive lobular carcinoma and lobular carcinoma in situ (lcis). Radiology and pathology are concordant.  Right malignancy appears unifocal. Suspicious architectural distortion cover an area of 1 cm. Clip was displaced medially by 0.7 cm and inferiorly by 1 cm.   Ultrasound of the right axilla was not previously performed. Recent imaging of the contralateral left breast  dated July 19, 2024 was reviewed and shows no suspicious findings.  Surgical Consultation for the right breast.  Patient should also potentially undergo right axillary ultrasound as not previously performed.  Given the lobular nature of the carcinoma, breast MRI should be considered      9/17/2024 Genetic Testing    [facility]  A total of 59 genes were evaluated, including: APC, TEREZA, BARD 1, BMPR1A, BRCA1, BRCA2, BRIP1, CDH1, CDK4, CDKN1B, CKDN2A, CHEK2, DICER1, FH, FLCN, KIF1B, MAX, MEN1, MET, MLH1, MSH2, MSH6, MUTYH, NBN, NF1, NTHL1, PALB2, PMS2, POT1, PTEN, RAD51C, RAD51D, RB1, RET, SDHA, SDHAF2, SDHB, SDHC, SDHD, SMAD4, SMARCA4, STK11, TFDF576, TP53, TSC1, TSC2, VHL (sequencing and depletion/duplication); AXIN2, CTNNA1, EGLN1, HOXB13, KIT, MITF, MSH3, PDGFRA, POLD1, AND POLE (sequencing only); EPCAM, and GREM1 (depletion/duplication only)  Negative result. No pathogenic sequence variants or deletions/duplications identified     9/27/2024 Biopsy    Right breast MRI guided biopsy   12 o'clock 7cm from nipple  Invasive mammary carcinoma with ductal and lobular features  Cerrillos grade 1 of 3  Lymph-vascular invasion not identified  ER >95  CA 1-5%  HER2 1+    Radiology and pathology are concordant. Right malignancy appears multifocal. Suspicious masses cover an area of 3.0 cm (AP) by 0.8 cm (transverse) measured on MRI guided breast biopsy performed 9/27/2024.  This does include the second focus not sampled.  Ultrasound of the right axilla was not performed but there is no suspicious adenopathy on breast MRI performed 9/10/2024.  Recent imaging of the contralateral left breast including screening mammogram dated 7/19/2024 and breast MRI performed 9/10/2024 was reviewed and shows no suspicious findings.           Interval History:   Follow-up with right breast cancer stage Ib s/p staging workup.    Review of Systems:   Review of Systems   Constitutional:  Negative for chills and fever.   HENT:  Negative for ear pain  and sore throat.    Eyes:  Negative for pain and visual disturbance.   Respiratory:  Negative for cough and shortness of breath.    Cardiovascular:  Negative for chest pain and palpitations.   Gastrointestinal:  Negative for abdominal pain and vomiting.   Genitourinary:  Negative for dysuria and hematuria.   Musculoskeletal:  Negative for arthralgias and back pain.   Skin:  Negative for color change and rash.   Neurological:  Negative for seizures and syncope.   All other systems reviewed and are negative.    Past Medical History     Patient Active Problem List   Diagnosis   • Mixed hyperlipidemia   • Class 1 obesity due to excess calories without serious comorbidity in adult   • Malignant neoplasm of upper-inner quadrant of right breast in female, estrogen receptor positive (HCC)     Past Medical History:   Diagnosis Date   • Bradycardia 4/3/2020   • Lightheaded 1/17/2022   • Vertigo      Past Surgical History:   Procedure Laterality Date   • MAMMO STEREOTACTIC BREAST BIOPSY RIGHT (ALL INC) Right 08/29/2024   • MAMMO STEREOTACTIC BREAST BIOPSY RIGHT (ALL INC) Right 8/29/2024   • MRI BREAST BIOPSY RIGHT (ALL INCLUSIVE) Right 9/27/2024     Family History   Problem Relation Age of Onset   • Dementia Mother    • Heart attack Father    • No Known Problems Brother    • No Known Problems Brother    • No Known Problems Son    • Hyperlipidemia Son    • No Known Problems Son    • No Known Problems Daughter    • No Known Problems Maternal Grandmother    • No Known Problems Maternal Grandfather    • No Known Problems Paternal Grandmother    • No Known Problems Paternal Grandfather    • Brain cancer Maternal Aunt    • Coronary artery disease Maternal Uncle      Social History     Socioeconomic History   • Marital status:      Spouse name: Not on file   • Number of children: Not on file   • Years of education: Not on file   • Highest education level: Not on file   Occupational History   • Not on file   Tobacco Use   •  Smoking status: Never   • Smokeless tobacco: Never   Vaping Use   • Vaping status: Never Used   Substance and Sexual Activity   • Alcohol use: Not Currently     Comment: 1 per month   • Drug use: Never   • Sexual activity: Not Currently     Birth control/protection: Post-menopausal   Other Topics Concern   • Not on file   Social History Narrative   • Not on file     Social Determinants of Health     Financial Resource Strain: Low Risk  (8/28/2023)    Overall Financial Resource Strain (CARDIA)    • Difficulty of Paying Living Expenses: Not hard at all   Food Insecurity: No Food Insecurity (9/2/2024)    Hunger Vital Sign    • Worried About Running Out of Food in the Last Year: Never true    • Ran Out of Food in the Last Year: Never true   Transportation Needs: No Transportation Needs (9/2/2024)    PRAPARE - Transportation    • Lack of Transportation (Medical): No    • Lack of Transportation (Non-Medical): No   Physical Activity: Not on file   Stress: Not on file   Social Connections: Not on file   Intimate Partner Violence: Not on file   Housing Stability: Low Risk  (9/2/2024)    Housing Stability Vital Sign    • Unable to Pay for Housing in the Last Year: No    • Number of Times Moved in the Last Year: 0    • Homeless in the Last Year: No       Current Outpatient Medications:   •  Calcium Ascorbate (VITAMIN C) 500 mg tablet, Take 500 mg by mouth daily, Disp: , Rfl:   •  Calcium Carb-Cholecalciferol (CALCIUM 1000 + D PO), Take 1 tablet by mouth daily, Disp: , Rfl:   •  LORazepam (Ativan) 0.5 mg tablet, Take one tablet 30 min before the MRI, then one tablet immediately before the MRI. Do not drive., Disp: 2 tablet, Rfl: 0  •  Lysine 500 MG CAPS, Take 1 capsule by mouth daily, Disp: , Rfl:   •  Multiple Vitamin (MULTIVITAMIN ADULT PO), Take 1 tablet by mouth daily, Disp: , Rfl:   •  rosuvastatin (CRESTOR) 10 MG tablet, Take 1 tablet (10 mg total) by mouth daily, Disp: 100 tablet, Rfl: 1  •  Vitamin D, Cholecalciferol,  50 MCG (2000 UT) CAPS, Take 1 tablet by mouth daily, Disp: , Rfl:   •  vitamin E, tocopherol, 400 units capsule, Take 400 Units by mouth daily, Disp: , Rfl:   Allergies   Allergen Reactions   • Atorvastatin Arthralgia       Physical Exam:     Vitals:    10/07/24 1306   BP: 156/88   Pulse: 68   Temp: 98 °F (36.7 °C)   SpO2: 97%     Physical Exam  Constitutional:       Appearance: Normal appearance.   HENT:      Head: Normocephalic and atraumatic.      Nose: Nose normal.      Mouth/Throat:      Mouth: Mucous membranes are moist.   Eyes:      Pupils: Pupils are equal, round, and reactive to light.   Cardiovascular:      Rate and Rhythm: Normal rate.      Pulses: Normal pulses.      Heart sounds: Normal heart sounds.   Pulmonary:      Effort: Pulmonary effort is normal.      Breath sounds: Normal breath sounds.   Chest:          Comments: Bilateral breast no discrete palpable mass or masses nipple discharge nipple retraction or skin changes.  Right breast is bruised from recent biopsies.  Bilateral axilla and supraclavicular examination no palpable adenopathy.  Patient was examined seated as well as supine position.  Abdominal:      General: Bowel sounds are normal.      Palpations: Abdomen is soft.   Musculoskeletal:         General: Normal range of motion.      Cervical back: Normal range of motion and neck supple.   Skin:     General: Skin is warm.   Neurological:      General: No focal deficit present.      Mental Status: She is alert and oriented to person, place, and time.   Psychiatric:         Mood and Affect: Mood normal.         Behavior: Behavior normal.         Thought Content: Thought content normal.         Judgment: Judgment normal.       Results & Discussion:     Result Text   BONE SCAN  WHOLE BODY     INDICATION: C50.919: Malignant neoplasm of unspecified site of unspecified female breast. Invasive lobular carcinoma of the right breast.     PREVIOUS FILM CORRELATION:    CT chest abdomen pelvis  9/6/2024.     TECHNIQUE:   This study was performed following the intravenous administration of 23.8 mCi Tc-99m labeled MDP.  Delayed, anterior and posterior whole body images were acquired, 2-3 hours after radiopharmaceutical administration. Additional delayed   oblique static images acquired of the bilateral ribs and pelvis.     FINDINGS:     Scattered mild exophytic foci of uptake along the thoracolumbar spine corresponding to degenerative changes on CT.     Foci of increased uptake at the bilateral humeral heads, knees, and feet corresponding with degenerative change.     There is no scintigraphic evidence of osseous metastasis.     Fairly symmetric physiologic renal uptake.        IMPRESSION:     1.  No scintigraphic evidence of osseous metastasis.        CT CHEST, ABDOMEN AND PELVIS WITH IV CONTRAST     INDICATION: C50.919: Malignant neoplasm of unspecified site of unspecified female breast.     As per review of electronic medical record, patient with recently diagnosed right-sided breast cancer.     COMPARISON: No similar prior studies available for comparison.     TECHNIQUE: CT examination of the chest, abdomen and pelvis was performed. In addition to images of the abdomen and pelvis during the portal venous phase, images were also obtained during a delayed phase through the upper abdominal viscera. Multiplanar 2D   reformatted images were created from the source data.     This examination, like all CT scans performed in the [facility], was performed utilizing techniques to minimize radiation dose exposure, including the use of iterative reconstruction and automated exposure control. Radiation dose length   product (DLP) for this visit:  1150 mGy-cm     IV Contrast: 100 mL of iohexol (OMNIPAQUE)  Enteric Contrast: Enteric contrast was not administered.     FINDINGS:     CHEST     BRONCHOPULMONARY:  Clear central airways. Mild basilar predominant subpleural reticulation.     There is a  micronodule in the right middle lobe (series 3 image 171).     There is a 7 mm lentiform shaped juxtapleural left upper lobe nodule posteriorly (series 3 image 58). Given the triangular shape and juxtapleural location this likely represents an intrapulmonary lymph node.     PLEURA:  No pleural effusions. No pneumothorax.     HEART/GREAT VESSELS: The heart is normal in size. No pericardial effusion.     Normal caliber thoracic aorta.     MEDIASTINUM/LYMPH NODES:  No axillary, internal thoracic (formerly internal mammary), mediastinal or hilar lymphadenopathy.     CHEST WALL AND LOWER NECK: There is a 4.0 x 1.2 x 2.7 cm (CC x AP x transverse) collection in the posterior upper right breast, with a biopsy clip centrally. This collection is just superficial to the right pectoralis major muscle.     1 cm slightly hypodense subcutaneous lesion abutting the skin surface in the upper back in the midline, likely a sebaceous cyst.     ABDOMEN     LIVER/BILIARY TREE:  Normal liver morphology. The liver is enlarged, measuring 23.1 cm in greatest craniocaudad  dimension.     Several simple cysts are seen in the liver. There are also several subcentimeter hypodense lesions in the liver, too small to accurately characterize with CT technique, however statistically most likely are cysts.     No biliary ductal dilation.     GALLBLADDER: Several gallstones within the gallbladder. No pericholecystic inflammatory change.     SPLEEN: Unremarkable.     PANCREAS:  Unremarkable. Normal caliber main pancreatic duct.     ADRENAL GLANDS:  Unremarkable.     KIDNEYS/URETERS: Symmetric renal enhancement.  No intrarenal or ureteral calculi. No hydronephrosis. There are subcentimeter hypodense lesions in both kidneys, too small to accurately characterize with CT technique, however statistically likely cysts.     STOMACH AND BOWEL:  Evaluation of the gastrointestinal tract is somewhat limited by underdistention and lack of oral contrast. No bowel  obstruction or convincing inflammation.  Scattered colonic diverticuli, however no evidence of diverticulitis.     APPENDIX:  The appendix is not visualized, however there are no secondary findings of appendicitis.     ABDOMINOPELVIC CAVITY:  No ascites or pneumoperitoneum. Slight haziness of the mesentery around nonenlarged lymph nodes, likely sclerosing mesenteritis.     LYMPH NODES: No abdominal or pelvic lymphadenopathy.     VESSELS: Normal caliber aorta. Patent major branch vessels. Scattered atherosclerotic calcifications in the abdominal aorta and its major branches.     PELVIS     REPRODUCTIVE ORGANS: Likely to fibroid seen within the uterus. The CT appearance of the uterus is otherwise within normal limits.     URINARY BLADDER:  The urinary bladder is underdistended, somewhat limiting evaluation, however grossly within normal limits.     ABDOMINAL WALL/INGUINAL REGIONS: No ventral abdominal wall hernia.     MUSCULOSKELETAL:  No focal aggressive osseous lesions. Degenerative changes of the spine.     IMPRESSION:     1) 4.0 x 2.7 x 1.2 cm collection in the posterior upper right breast just superficial to the right pectoralis major muscle, with a biopsy clip centrally, likely related to the prior biopsy.     2) No convincing evidence of metastatic disease in the chest, abdomen and pelvis.     3) Nonspecific right middle lobe micronodule. Although felt to be unlikely to be a metastasis, given lack of prior studies to establish chronicity, recommend repeat chest CT in 3 months.     4) Several simple cysts in the liver, as well as several subcentimeter hypodense lesions too small to accurate characterize with CT technique. Statistically these likely represent additional cysts, however consider confirmation with abdominal MRI if   there are no contraindications versus 3-month follow-up contrast-enhanced CT abdomen.     I did review films of CT chest abdomen pelvis and bone scan and discussed with patient in  detail.  I did discussed in detail nature of breast cancer stage Ib and further management. Surgical consent was obtained after explaining benefits, alternative, procedure and possible complications with regards above mentioned procedure. All her questions regarding the visit  as well as the  surgery were answered to  the patient's satisfaction. she understands and  agrees . All patient questions were answered.       Advance Care Planning/Advance Directives:  I Sha Walden MD discussed the disease status with Samaria Barger  today 10/07/24  treatment plans and follow-up with the patient.

## 2024-10-08 NOTE — PROGRESS NOTES
Message rec'd from Dr. Kyler Bruce's office regarding recommendation for;    __X___ RIGHT ______LEFT      __X___SAVI  placement x2.    Procedure explained to patient by Dr. Walden, additional questions answered at that time    __X___Verbalized understanding.      Blood thinners:  _____yes __X___no    Date stopped: ___N/A____    All teaching points discussed during office visit, pt with no questions at that time, pt adv to arrive at 1230 for 1300 insertion    Pt given name/# for any further questions/needs

## 2024-10-10 ENCOUNTER — APPOINTMENT (OUTPATIENT)
Dept: RADIOLOGY | Facility: HOSPITAL | Age: 74
End: 2024-10-10
Payer: MEDICARE

## 2024-10-10 ENCOUNTER — OFFICE VISIT (OUTPATIENT)
Dept: LAB | Facility: HOSPITAL | Age: 74
End: 2024-10-10
Payer: MEDICARE

## 2024-10-10 ENCOUNTER — HOSPITAL ENCOUNTER (OUTPATIENT)
Dept: ULTRASOUND IMAGING | Facility: CLINIC | Age: 74
End: 2024-10-10
Payer: MEDICARE

## 2024-10-10 ENCOUNTER — HOSPITAL ENCOUNTER (OUTPATIENT)
Dept: MAMMOGRAPHY | Facility: CLINIC | Age: 74
Discharge: HOME | End: 2024-10-10
Payer: MEDICARE

## 2024-10-10 VITALS — HEART RATE: 60 BPM | SYSTOLIC BLOOD PRESSURE: 121 MMHG | DIASTOLIC BLOOD PRESSURE: 78 MMHG

## 2024-10-10 DIAGNOSIS — C50.211 MALIGNANT NEOPLASM OF UPPER-INNER QUADRANT OF RIGHT BREAST IN FEMALE, ESTROGEN RECEPTOR POSITIVE (HCC): ICD-10-CM

## 2024-10-10 DIAGNOSIS — Z01.818 PRE-OP EXAMINATION: ICD-10-CM

## 2024-10-10 DIAGNOSIS — Z17.0 MALIGNANT NEOPLASM OF UPPER-INNER QUADRANT OF RIGHT BREAST IN FEMALE, ESTROGEN RECEPTOR POSITIVE (HCC): ICD-10-CM

## 2024-10-10 DIAGNOSIS — R92.8 ABNORMAL MAMMOGRAM: ICD-10-CM

## 2024-10-10 DIAGNOSIS — C50.919 INVASIVE LOBULAR CARCINOMA OF BREAST IN FEMALE (HCC): ICD-10-CM

## 2024-10-10 LAB
ATRIAL RATE: 62 BPM
P AXIS: 39 DEGREES
PR INTERVAL: 188 MS
QRS AXIS: 4 DEGREES
QRSD INTERVAL: 88 MS
QT INTERVAL: 438 MS
QTC INTERVAL: 444 MS
T WAVE AXIS: -2 DEGREES
VENTRICULAR RATE: 62 BPM

## 2024-10-10 PROCEDURE — 93010 ELECTROCARDIOGRAM REPORT: CPT | Performed by: INTERNAL MEDICINE

## 2024-10-10 PROCEDURE — 19282 PERQ DEVICE BREAST EA IMAG: CPT

## 2024-10-10 PROCEDURE — 93005 ELECTROCARDIOGRAM TRACING: CPT

## 2024-10-10 PROCEDURE — 76642 ULTRASOUND BREAST LIMITED: CPT

## 2024-10-10 PROCEDURE — 19281 PERQ DEVICE BREAST 1ST IMAG: CPT

## 2024-10-10 RX ORDER — LIDOCAINE HYDROCHLORIDE 10 MG/ML
5 INJECTION, SOLUTION EPIDURAL; INFILTRATION; INTRACAUDAL; PERINEURAL ONCE
Status: COMPLETED | OUTPATIENT
Start: 2024-10-10 | End: 2024-10-10

## 2024-10-10 RX ADMIN — LIDOCAINE HYDROCHLORIDE 5 ML: 10 INJECTION, SOLUTION EPIDURAL; INFILTRATION; INTRACAUDAL; PERINEURAL at 13:25

## 2024-10-10 NOTE — PROGRESS NOTES
Procedure type:    _____ultrasound guided __x___stereotactic    Breast:    _____Left __x___Right    Location: 12 o'clock    Needle: n/a    # of passes: n/a    Clip: 5.0 geri     Performed by: Dr. Terrell Kennedy    Pressure held for 5 minutes by: Janet Becker    Steronel Strips:    ___X__yes _____no    Band aid:    __X___yes_____no    Tolerated procedure:    __X___yes _____no

## 2024-10-10 NOTE — PROGRESS NOTES
Procedure type:    _____ultrasound guided ___x__stereotactic    Breast:    _____Left __x___Right    Location: 1 o'clock    Needle: n/a    # of passes: n/a    Clip: 7.5 geri     Performed by: Dr. Terrell Kennedy    Pressure held for 5 minutes by: Janet Becker    Steronel Strips:    ___X__yes _____no    Band aid:    __X___yes_____no    Tolerated procedure:    __X___yes _____no

## 2024-10-18 ENCOUNTER — OFFICE VISIT (OUTPATIENT)
Age: 74
End: 2024-10-18
Payer: MEDICARE

## 2024-10-18 VITALS
WEIGHT: 194 LBS | DIASTOLIC BLOOD PRESSURE: 87 MMHG | SYSTOLIC BLOOD PRESSURE: 141 MMHG | HEIGHT: 65 IN | TEMPERATURE: 97.9 F | BODY MASS INDEX: 32.32 KG/M2 | HEART RATE: 65 BPM

## 2024-10-18 DIAGNOSIS — Z01.818 PRE-OP EXAMINATION: ICD-10-CM

## 2024-10-18 DIAGNOSIS — C50.211 MALIGNANT NEOPLASM OF UPPER-INNER QUADRANT OF RIGHT BREAST IN FEMALE, ESTROGEN RECEPTOR POSITIVE (HCC): ICD-10-CM

## 2024-10-18 DIAGNOSIS — Z17.0 MALIGNANT NEOPLASM OF UPPER-INNER QUADRANT OF RIGHT BREAST IN FEMALE, ESTROGEN RECEPTOR POSITIVE (HCC): ICD-10-CM

## 2024-10-18 PROCEDURE — 99204 OFFICE O/P NEW MOD 45 MIN: CPT | Performed by: PLASTIC SURGERY

## 2024-10-19 NOTE — PROGRESS NOTES
Assessment & Plan   74-year-old female with right-sided breast cancer  1.)  Patient would be an excellent candidate for either autologous or implant  2.)  At this time patient states that she feels she would like to proceed with mastectomy alone and possible delayed reconstruction  3.)  I discussed with her that this is reasonable and we would be available for her reconstructive efforts as needed  4.)  Patient to follow-up as needed    Discussion- Discussion--discussed with patient her options for reconstruction, discussed with the patient the options for autologous versus implant based reconstruction.  I discussed with her that breast reconstruction is typically performed in stages with the 1st step being placement of a tissue expander at the time of mastectomy.  I discussed with her the risks, benefits, alternatives of tissue expander placement including the risk of bleeding, infection, scarring, poor wound healing, demonstrating underlying structures, need for further surgery, need for multiple procedures, mastectomy flap necrosis, partial mastectomy flap necrosis, the off-label use of acellular dermal matrix, the risk of breast implant associated anaplastic large cell lymphoma, the need for the expansion process, the risk of seroma, the risk of DVT, risk of asymmetry, the need for multiple procedures, the need for revision, poor aesthetic result, asymmetry.  I discussed with her the use of drains, the use of eusebio 90 minutes negative pressure wound therapy dressings, the expected recovery time, the expected hospital stay, I discussed with her the reconstructive expansion process.  I discussed with her that at the completion of expansion we would need at least 3 months after the completion of expansion no post mastectomy treatment is needed.  I discussed with her the need for chemotherapy radiation with push that time when back.  I discussed with her the next step for reconstruction options include the use of  implant based reconstruction, I discussed with her the risks, benefits, alternatives exchange procedure, the implants do not typically fair as well with radiation, I discussed with her the use of silicone implants, the risk of leak, the clinical significance of implant leak, I discussed with her the screening for MRI recommendations.  I discussed with her the option of autologous based reconstruction.  I discussed with her the nature of tram flap surgery versus CHELSEA flap surgery, and discussion of complex nature of micro surgery, the risks, benefits and alternate is of abdominally based autologous reconstruction including the above with the additional risk of micro surgical failure, flap failure, need for ICU stay, need for multiple procedures, need for revision, we discussed the option of nipple reconstruction.  All the patient's questions were answered to her satisfaction.  Patient would be an excellent candidate for either autologous or implant-based reconstruction, at this time the patient states that she feels that she would prefer delayed reconstruction if she decides ultimately to proceed with reconstruction.  Discussed with the patient this is perfectly reasonable and we will be available for her reconstructive efforts as needed.  Patient can follow-up as needed    Counseling dominated visit, total counseling time 35 minutes, total visit time 45 minutes including documentation, review of her chart and coordination of care.      Subjective   Patient ID: Samaria Barger is a 74 y.o. female.    Vitals:    10/18/24 1008   BP: 141/87   Pulse: 65   Temp: 97.9 °F (36.6 °C)     HPI  Patient is a 74-year-old female who is here today to discuss breast reconstruction options.  Patient states that she first discovered the lesion on mammography.  She otherwise does not have any major medical conditions, she does not have a strong family history of breast cancer, she is a non-smoker, she does not take steroids or blood  thinners.  She otherwise has no complaints.        The following portions of the patient's history were reviewed and updated as appropriate: allergies, current medications, past family history, past medical history, past social history, past surgical history, and problem list.    Review of Systems   All other systems reviewed and are negative.      Objective   Physical Exam   Constitutional  She appears well-developed and well-nourished.     Eyes  Pupils are equal, round, and reactive to light. System normal.     General -             Right: Right eye extraocular movements are normal.             Left: Left eye extraocular movements are normal.       Skin  Skin is warm.     Psychiatric  She has a normal mood and affect. Her behavior is normal. Judgment and thought content normal.   Bilateral breast test-grade 3 ptosis, no palpable masses appreciated, no adenopathy    Past Medical History:   Diagnosis Date    Bradycardia 4/3/2020    Lightheaded 1/17/2022    Vertigo      Past Surgical History:   Procedure Laterality Date    MAMMO NEEDLE LOCALIZATION LEFT (ALL INC) Right 10/10/2024    MAMMO NEEDLE LOCALIZATION LEFT (ALL INC) EACH ADD Right 10/10/2024    MAMMO STEREOTACTIC BREAST BIOPSY RIGHT (ALL INC) Right 08/29/2024    MAMMO STEREOTACTIC BREAST BIOPSY RIGHT (ALL INC) Right 8/29/2024    MRI BREAST BIOPSY RIGHT (ALL INCLUSIVE) Right 9/27/2024     Current Outpatient Medications   Medication Instructions    Calcium Ascorbate (VITAMIN C) 500 mg, Oral, Daily    Calcium Carb-Cholecalciferol (CALCIUM 1000 + D PO) 1 tablet, Oral, Daily    LORazepam (Ativan) 0.5 mg tablet Take one tablet 30 min before the MRI, then one tablet immediately before the MRI. Do not drive.    Lysine 500 MG CAPS 1 capsule, Oral, Daily    Multiple Vitamin (MULTIVITAMIN ADULT PO) 1 tablet, Oral, Daily    rosuvastatin (CRESTOR) 10 mg, Oral, Daily    Vitamin D, Cholecalciferol, 50 MCG (2000 UT) CAPS 1 tablet, Oral, Daily    vitamin E (tocopherol) 400  Units, Oral, Daily       Social History     Social History Narrative    Not on file     Social History     Tobacco Use   Smoking Status Never   Smokeless Tobacco Never

## 2024-10-23 ENCOUNTER — APPOINTMENT (OUTPATIENT)
Dept: LAB | Facility: HOSPITAL | Age: 74
End: 2024-10-23
Payer: MEDICARE

## 2024-10-23 ENCOUNTER — OFFICE VISIT (OUTPATIENT)
Dept: SURGICAL ONCOLOGY | Facility: CLINIC | Age: 74
End: 2024-10-23
Payer: MEDICARE

## 2024-10-23 VITALS
WEIGHT: 205 LBS | OXYGEN SATURATION: 97 % | TEMPERATURE: 97.9 F | HEIGHT: 65 IN | HEART RATE: 88 BPM | BODY MASS INDEX: 34.16 KG/M2 | DIASTOLIC BLOOD PRESSURE: 72 MMHG | SYSTOLIC BLOOD PRESSURE: 134 MMHG

## 2024-10-23 DIAGNOSIS — C50.211 MALIGNANT NEOPLASM OF UPPER-INNER QUADRANT OF RIGHT BREAST IN FEMALE, ESTROGEN RECEPTOR POSITIVE (HCC): ICD-10-CM

## 2024-10-23 DIAGNOSIS — Z01.818 PRE-OP EXAMINATION: ICD-10-CM

## 2024-10-23 DIAGNOSIS — G89.18 POST-OP PAIN: ICD-10-CM

## 2024-10-23 DIAGNOSIS — Z17.0 MALIGNANT NEOPLASM OF UPPER-INNER QUADRANT OF RIGHT BREAST IN FEMALE, ESTROGEN RECEPTOR POSITIVE (HCC): Primary | ICD-10-CM

## 2024-10-23 DIAGNOSIS — Z17.0 MALIGNANT NEOPLASM OF UPPER-INNER QUADRANT OF RIGHT BREAST IN FEMALE, ESTROGEN RECEPTOR POSITIVE (HCC): ICD-10-CM

## 2024-10-23 DIAGNOSIS — C50.211 MALIGNANT NEOPLASM OF UPPER-INNER QUADRANT OF RIGHT BREAST IN FEMALE, ESTROGEN RECEPTOR POSITIVE (HCC): Primary | ICD-10-CM

## 2024-10-23 LAB
ALBUMIN SERPL BCG-MCNC: 4.4 G/DL (ref 3.5–5)
ALP SERPL-CCNC: 55 U/L (ref 34–104)
ALT SERPL W P-5'-P-CCNC: 22 U/L (ref 7–52)
ANION GAP SERPL CALCULATED.3IONS-SCNC: 4 MMOL/L (ref 4–13)
AST SERPL W P-5'-P-CCNC: 19 U/L (ref 13–39)
BASOPHILS # BLD AUTO: 0.05 THOUSANDS/ΜL (ref 0–0.1)
BASOPHILS NFR BLD AUTO: 1 % (ref 0–1)
BILIRUB SERPL-MCNC: 0.42 MG/DL (ref 0.2–1)
BUN SERPL-MCNC: 18 MG/DL (ref 5–25)
CALCIUM SERPL-MCNC: 9.3 MG/DL (ref 8.4–10.2)
CHLORIDE SERPL-SCNC: 106 MMOL/L (ref 96–108)
CO2 SERPL-SCNC: 30 MMOL/L (ref 21–32)
CREAT SERPL-MCNC: 0.87 MG/DL (ref 0.6–1.3)
EOSINOPHIL # BLD AUTO: 0.08 THOUSAND/ΜL (ref 0–0.61)
EOSINOPHIL NFR BLD AUTO: 1 % (ref 0–6)
ERYTHROCYTE [DISTWIDTH] IN BLOOD BY AUTOMATED COUNT: 13.3 % (ref 11.6–15.1)
GFR SERPL CREATININE-BSD FRML MDRD: 66 ML/MIN/1.73SQ M
GLUCOSE SERPL-MCNC: 91 MG/DL (ref 65–140)
HCT VFR BLD AUTO: 43.5 % (ref 34.8–46.1)
HGB BLD-MCNC: 13.5 G/DL (ref 11.5–15.4)
IMM GRANULOCYTES # BLD AUTO: 0.02 THOUSAND/UL (ref 0–0.2)
IMM GRANULOCYTES NFR BLD AUTO: 0 % (ref 0–2)
LYMPHOCYTES # BLD AUTO: 2.34 THOUSANDS/ΜL (ref 0.6–4.47)
LYMPHOCYTES NFR BLD AUTO: 37 % (ref 14–44)
MCH RBC QN AUTO: 28.7 PG (ref 26.8–34.3)
MCHC RBC AUTO-ENTMCNC: 31 G/DL (ref 31.4–37.4)
MCV RBC AUTO: 92 FL (ref 82–98)
MONOCYTES # BLD AUTO: 0.55 THOUSAND/ΜL (ref 0.17–1.22)
MONOCYTES NFR BLD AUTO: 9 % (ref 4–12)
NEUTROPHILS # BLD AUTO: 3.23 THOUSANDS/ΜL (ref 1.85–7.62)
NEUTS SEG NFR BLD AUTO: 52 % (ref 43–75)
NRBC BLD AUTO-RTO: 0 /100 WBCS
PLATELET # BLD AUTO: 332 THOUSANDS/UL (ref 149–390)
PMV BLD AUTO: 9.2 FL (ref 8.9–12.7)
POTASSIUM SERPL-SCNC: 4.5 MMOL/L (ref 3.5–5.3)
PROT SERPL-MCNC: 7.4 G/DL (ref 6.4–8.4)
RBC # BLD AUTO: 4.71 MILLION/UL (ref 3.81–5.12)
SODIUM SERPL-SCNC: 140 MMOL/L (ref 135–147)
WBC # BLD AUTO: 6.27 THOUSAND/UL (ref 4.31–10.16)

## 2024-10-23 PROCEDURE — 85025 COMPLETE CBC W/AUTO DIFF WBC: CPT

## 2024-10-23 PROCEDURE — 80053 COMPREHEN METABOLIC PANEL: CPT

## 2024-10-23 PROCEDURE — 99215 OFFICE O/P EST HI 40 MIN: CPT | Performed by: SURGERY

## 2024-10-23 PROCEDURE — 36415 COLL VENOUS BLD VENIPUNCTURE: CPT

## 2024-10-23 RX ORDER — ACETAMINOPHEN AND CODEINE PHOSPHATE 300; 30 MG/1; MG/1
1 TABLET ORAL EVERY 4 HOURS PRN
Qty: 30 TABLET | Refills: 0 | Status: SHIPPED | OUTPATIENT
Start: 2024-10-23

## 2024-10-23 NOTE — H&P (VIEW-ONLY)
Surgical Oncology Follow Up  [facility]  [facility] [place]  [address] [id number]    Samaria Barger  1950  [id number]      Chief Complaint   Patient presents with   • Follow-up     2 Week Follow Up        Assessment & Plan:   74-year-old female with multifocal right breast cancer   ER ER/HI positive HER2 negative tumor.  She is overall doing well.  She had plastic surgery consultation and decided against reconstruction.  Again we discussed the surgery in detail surgical consent has been obtained for right breast mastectomy with sentinel lymph node biopsy dual tracer technique possible axillary dissection as well as left mastectomy.  Will proceed with surgery.  Cancer History:     Oncology History   Malignant neoplasm of upper-inner quadrant of right breast in female, estrogen receptor positive (HCC)   8/29/2024 Biopsy    Right breast stereotactic biopsy  1 o'clock  Invasive lobular carcinoma  Histologic grade 1   ER 90  HI 60  HER2 0    The pathology results indicate a Malignant finding with invasive lobular carcinoma and lobular carcinoma in situ (lcis). Radiology and pathology are concordant.  Right malignancy appears unifocal. Suspicious architectural distortion cover an area of 1 cm. Clip was displaced medially by 0.7 cm and inferiorly by 1 cm.   Ultrasound of the right axilla was not previously performed. Recent imaging of the contralateral left breast dated July 19, 2024 was reviewed and shows no suspicious findings.  Surgical Consultation for the right breast.  Patient should also potentially undergo right axillary ultrasound as not previously performed.  Given the lobular nature of the carcinoma, breast MRI should be considered      9/17/2024 Genetic Testing    [facility]  A total of 59 genes were evaluated, including: APC, TEREZA, BARD 1, BMPR1A, BRCA1, BRCA2, BRIP1, CDH1, CDK4, CDKN1B, CKDN2A, CHEK2, DICER1, FH, FLCN, KIF1B, MAX, MEN1, MET,  MLH1, MSH2, MSH6, MUTYH, NBN, NF1, NTHL1, PALB2, PMS2, POT1, PTEN, RAD51C, RAD51D, RB1, RET, SDHA, SDHAF2, SDHB, SDHC, SDHD, SMAD4, SMARCA4, STK11, FLGZ107, TP53, TSC1, TSC2, VHL (sequencing and depletion/duplication); AXIN2, CTNNA1, EGLN1, HOXB13, KIT, MITF, MSH3, PDGFRA, POLD1, AND POLE (sequencing only); EPCAM, and GREM1 (depletion/duplication only)  Negative result. No pathogenic sequence variants or deletions/duplications identified     9/27/2024 Biopsy    Right breast MRI guided biopsy   12 o'clock 7cm from nipple  Invasive mammary carcinoma with ductal and lobular features  [place] grade 1 of 3  Lymph-vascular invasion not identified  ER >95  W[place] 1-5%  HER2 1+    Radiology and pathology are concordant. Right malignancy appears multifocal. Suspicious masses cover an area of 3.0 cm (AP) by 0.8 cm (transverse) measured on MRI guided breast biopsy performed 9/27/2024.  This does include the second focus not sampled.  Ultrasound of the right axilla was not performed but there is no suspicious adenopathy on breast MRI performed 9/10/2024.  Recent imaging of the contralateral left breast including screening mammogram dated 7/19/2024 and breast MRI performed 9/10/2024 was reviewed and shows no suspicious findings.     9/27/2024 -  Cancer Staged    Staging form: Breast, AJCC 8th Edition  - Clinical stage from 9/27/2024: Stage IB (cT2, cN0, cM0, G1, ER+, WY+, HER2-) - Signed by Sha Walden MD on 10/7/2024  Stage prefix: Initial diagnosis  Histologic grading system: 3 grade system             Interval History:   Follow-up with right breast cancer    Review of Systems:   Review of Systems   Constitutional:  Negative for chills and fever.   HENT:  Negative for ear pain and sore throat.    Eyes:  Negative for pain and visual disturbance.   Respiratory:  Negative for cough and shortness of breath.    Cardiovascular:  Negative for chest pain and palpitations.   Gastrointestinal:  Negative for abdominal pain  and vomiting.   Genitourinary:  Negative for dysuria and hematuria.   Musculoskeletal:  Negative for arthralgias and back pain.   Skin:  Negative for color change and rash.   Neurological:  Negative for seizures and syncope.   All other systems reviewed and are negative.    Past Medical History     Patient Active Problem List   Diagnosis   • Mixed hyperlipidemia   • Class 1 obesity due to excess calories without serious comorbidity in adult   • Malignant neoplasm of upper-inner quadrant of right breast in female, estrogen receptor positive (HCC)     Past Medical History:   Diagnosis Date   • Bradycardia 4/3/2020   • Lightheaded 1/17/2022   • Vertigo      Past Surgical History:   Procedure Laterality Date   • MAMMO NEEDLE LOCALIZATION LEFT (ALL INC) Right 10/10/2024   • MAMMO NEEDLE LOCALIZATION LEFT (ALL INC) EACH ADD Right 10/10/2024   • MAMMO STEREOTACTIC BREAST BIOPSY RIGHT (ALL INC) Right 08/29/2024   • MAMMO STEREOTACTIC BREAST BIOPSY RIGHT (ALL INC) Right 8/29/2024   • MRI BREAST BIOPSY RIGHT (ALL INCLUSIVE) Right 9/27/2024     Family History   Problem Relation Age of Onset   • Dementia Mother    • Heart attack Father    • No Known Problems Brother    • No Known Problems Brother    • No Known Problems Son    • Hyperlipidemia Son    • No Known Problems Son    • No Known Problems Daughter    • No Known Problems Maternal Grandmother    • No Known Problems Maternal Grandfather    • No Known Problems Paternal Grandmother    • No Known Problems Paternal Grandfather    • Brain cancer Maternal Aunt    • Coronary artery disease Maternal Uncle      Social History     Socioeconomic History   • Marital status:      Spouse name: Not on file   • Number of children: Not on file   • Years of education: Not on file   • Highest education level: Not on file   Occupational History   • Not on file   Tobacco Use   • Smoking status: Never   • Smokeless tobacco: Never   Vaping Use   • Vaping status: Never Used   Substance and  Sexual Activity   • Alcohol use: Not Currently     Comment: 1 per month   • Drug use: Never   • Sexual activity: Not Currently     Birth control/protection: Post-menopausal   Other Topics Concern   • Not on file   Social History Narrative   • Not on file     Social Determinants of Health     Financial Resource Strain: Low Risk  (8/28/2023)    Overall Financial Resource Strain (CARDIA)    • Difficulty of Paying Living Expenses: Not hard at all   Food Insecurity: No Food Insecurity (9/2/2024)    Nursing - Inadequate Food Risk Classification    • Worried About Running Out of Food in the Last Year: Never true    • Ran Out of Food in the Last Year: Never true    • Ran Out of Food in the Last Year: Not on file   Transportation Needs: No Transportation Needs (9/2/2024)    PRAPARE - Transportation    • Lack of Transportation (Medical): No    • Lack of Transportation (Non-Medical): No   Physical Activity: Not on file   Stress: Not on file   Social Connections: Not on file   Intimate Partner Violence: Not on file   Housing Stability: Low Risk  (9/2/2024)    Housing Stability Vital Sign    • Unable to Pay for Housing in the Last Year: No    • Number of Times Moved in the Last Year: 0    • Homeless in the Last Year: No       Current Outpatient Medications:   •  Calcium Ascorbate (VITAMIN C) 500 mg tablet, Take 500 mg by mouth daily, Disp: , Rfl:   •  Calcium Carb-Cholecalciferol (CALCIUM 1000 + D PO), Take 1 tablet by mouth daily, Disp: , Rfl:   •  Lysine 500 MG CAPS, Take 1 capsule by mouth daily, Disp: , Rfl:   •  Multiple Vitamin (MULTIVITAMIN ADULT PO), Take 1 tablet by mouth daily, Disp: , Rfl:   •  rosuvastatin (CRESTOR) 10 MG tablet, Take 1 tablet (10 mg total) by mouth daily, Disp: 100 tablet, Rfl: 1  •  Vitamin D, Cholecalciferol, 50 MCG (2000 UT) CAPS, Take 1 tablet by mouth daily, Disp: , Rfl:   •  vitamin E, tocopherol, 400 units capsule, Take 400 Units by mouth daily, Disp: , Rfl:   •  LORazepam (Ativan) 0.5 mg  tablet, Take one tablet 30 min before the MRI, then one tablet immediately before the MRI. Do not drive. (Patient not taking: Reported on 10/18/2024), Disp: 2 tablet, Rfl: 0  Allergies   Allergen Reactions   • Atorvastatin Arthralgia       Physical Exam:     Vitals:    10/23/24 1429   BP: 134/72   Pulse: 88   Temp: 97.9 °F (36.6 °C)   SpO2: 97%     Physical Exam  Constitutional:       Appearance: Normal appearance.   HENT:      Head: Normocephalic and atraumatic.      Nose: Nose normal.      Mouth/Throat:      Mouth: Mucous membranes are moist.   Eyes:      Pupils: Pupils are equal, round, and reactive to light.   Cardiovascular:      Rate and Rhythm: Normal rate.      Pulses: Normal pulses.      Heart sounds: Normal heart sounds.   Pulmonary:      Effort: Pulmonary effort is normal.      Breath sounds: Normal breath sounds.   Chest:          Comments: Lateral breast examination no palpable mass masses nipple discharge nipple retraction or skin changes.  Bilateral axillary and supraclavicular examination no palpable adenopathy.  Patient was examined seated as well as supine position.  Abdominal:      General: Bowel sounds are normal.      Palpations: Abdomen is soft.   Musculoskeletal:         General: Normal range of motion.      Cervical back: Normal range of motion and neck supple.   Skin:     General: Skin is warm.   Neurological:      General: No focal deficit present.      Mental Status: She is alert and oriented to person, place, and time.   Psychiatric:         Mood and Affect: Mood normal.         Behavior: Behavior normal.         Thought Content: Thought content normal.         Judgment: Judgment normal.       Results & Discussion:   I did review pathology in detail invasive ductal as well as a lobular features.  We also discussed receptor status.  Surgical consent was obtained after explaining benefits, alternative, procedure and possible complications with regards above mentioned procedure. All her  questions regarding the visit  as well as the  surgery were answered to  the patient's satisfaction. she understands and  agrees . All patient questions were answered.       Advance Care Planning/Advance Directives:  I Sha Walden MD discussed the disease status with Samaria Barger  today 10/23/24  treatment plans and follow-up with the patient.

## 2024-10-23 NOTE — PROGRESS NOTES
Surgical Oncology Follow Up  [facility]  [facility] [place]  [address] [place] [address]    Samaria Barger  1950  [id number]      Chief Complaint   Patient presents with   • Follow-up     2 Week Follow Up        Assessment & Plan:   74-year-old female with multifocal right breast cancer   ER ER/LA positive HER2 negative tumor.  She is overall doing well.  She had plastic surgery consultation and decided against reconstruction.  Again we discussed the surgery in detail surgical consent has been obtained for right breast mastectomy with sentinel lymph node biopsy dual tracer technique possible axillary dissection as well as left mastectomy.  Will proceed with surgery.  Cancer History:     Oncology History   Malignant neoplasm of upper-inner quadrant of right breast in female, estrogen receptor positive (HCC)   8/29/2024 Biopsy    Right breast stereotactic biopsy  1 o'clock  Invasive lobular carcinoma  Histologic grade 1   ER 90  LA 60  HER2 0    The pathology results indicate a Malignant finding with invasive lobular carcinoma and lobular carcinoma in situ (lcis). Radiology and pathology are concordant.  Right malignancy appears unifocal. Suspicious architectural distortion cover an area of 1 cm. Clip was displaced medially by 0.7 cm and inferiorly by 1 cm.   Ultrasound of the right axilla was not previously performed. Recent imaging of the contralateral left breast dated July 19, 2024 was reviewed and shows no suspicious findings.  Surgical Consultation for the right breast.  Patient should also potentially undergo right axillary ultrasound as not previously performed.  Given the lobular nature of the carcinoma, breast MRI should be considered      9/17/2024 Genetic Testing    [facility]  A total of 59 genes were evaluated, including: APC, TEREZA, BARD 1, BMPR1A, BRCA1, BRCA2, BRIP1, CDH1, CDK4, CDKN1B, CKDN2A, CHEK2, DICER1, FH, FLCN, KIF1B, MAX, MEN1, MET,  MLH1, MSH2, MSH6, MUTYH, NBN, NF1, NTHL1, PALB2, PMS2, POT1, PTEN, RAD51C, RAD51D, RB1, RET, SDHA, SDHAF2, SDHB, SDHC, SDHD, SMAD4, SMARCA4, STK11, RDZT716, TP53, TSC1, TSC2, VHL (sequencing and depletion/duplication); AXIN2, CTNNA1, EGLN1, HOXB13, KIT, MITF, MSH3, PDGFRA, POLD1, AND POLE (sequencing only); EPCAM, and GREM1 (depletion/duplication only)  Negative result. No pathogenic sequence variants or deletions/duplications identified     9/27/2024 Biopsy    Right breast MRI guided biopsy   12 o'clock 7cm from nipple  Invasive mammary carcinoma with ductal and lobular features  [place] grade 1 of 3  Lymph-vascular invasion not identified  ER >95  AZ 1-5%  HER2 1+    Radiology and pathology are concordant. Right malignancy appears multifocal. Suspicious masses cover an area of 3.0 cm (AP) by 0.8 cm (transverse) measured on MRI guided breast biopsy performed 9/27/2024.  This does include the second focus not sampled.  Ultrasound of the right axilla was not performed but there is no suspicious adenopathy on breast MRI performed 9/10/2024.  Recent imaging of the contralateral left breast including screening mammogram dated 7/19/2024 and breast MRI performed 9/10/2024 was reviewed and shows no suspicious findings.     9/27/2024 -  Cancer Staged    Staging form: Breast, AJCC 8th Edition  - Clinical stage from 9/27/2024: Stage IB (cT2, cN0, cM0, G1, ER+, AZ+, HER2-) - Signed by Sha Walden MD on 10/7/2024  Stage prefix: Initial diagnosis  Histologic grading system: 3 grade system             Interval History:   Follow-up with right breast cancer    Review of Systems:   Review of Systems   Constitutional:  Negative for chills and fever.   HENT:  Negative for ear pain and sore throat.    Eyes:  Negative for pain and visual disturbance.   Respiratory:  Negative for cough and shortness of breath.    Cardiovascular:  Negative for chest pain and palpitations.   Gastrointestinal:  Negative for abdominal pain  and vomiting.   Genitourinary:  Negative for dysuria and hematuria.   Musculoskeletal:  Negative for arthralgias and back pain.   Skin:  Negative for color change and rash.   Neurological:  Negative for seizures and syncope.   All other systems reviewed and are negative.    Past Medical History     Patient Active Problem List   Diagnosis   • Mixed hyperlipidemia   • Class 1 obesity due to excess calories without serious comorbidity in adult   • Malignant neoplasm of upper-inner quadrant of right breast in female, estrogen receptor positive (HCC)     Past Medical History:   Diagnosis Date   • Bradycardia 4/3/2020   • Lightheaded 1/17/2022   • Vertigo      Past Surgical History:   Procedure Laterality Date   • MAMMO NEEDLE LOCALIZATION LEFT (ALL INC) Right 10/10/2024   • MAMMO NEEDLE LOCALIZATION LEFT (ALL INC) EACH ADD Right 10/10/2024   • MAMMO STEREOTACTIC BREAST BIOPSY RIGHT (ALL INC) Right 08/29/2024   • MAMMO STEREOTACTIC BREAST BIOPSY RIGHT (ALL INC) Right 8/29/2024   • MRI BREAST BIOPSY RIGHT (ALL INCLUSIVE) Right 9/27/2024     Family History   Problem Relation Age of Onset   • Dementia Mother    • Heart attack Father    • No Known Problems Brother    • No Known Problems Brother    • No Known Problems Son    • Hyperlipidemia Son    • No Known Problems Son    • No Known Problems Daughter    • No Known Problems Maternal Grandmother    • No Known Problems Maternal Grandfather    • No Known Problems Paternal Grandmother    • No Known Problems Paternal Grandfather    • Brain cancer Maternal Aunt    • Coronary artery disease Maternal Uncle      Social History     Socioeconomic History   • Marital status:      Spouse name: Not on file   • Number of children: Not on file   • Years of education: Not on file   • Highest education level: Not on file   Occupational History   • Not on file   Tobacco Use   • Smoking status: Never   • Smokeless tobacco: Never   Vaping Use   • Vaping status: Never Used   Substance and  Sexual Activity   • Alcohol use: Not Currently     Comment: 1 per month   • Drug use: Never   • Sexual activity: Not Currently     Birth control/protection: Post-menopausal   Other Topics Concern   • Not on file   Social History Narrative   • Not on file     Social Determinants of Health     Financial Resource Strain: Low Risk  (8/28/2023)    Overall Financial Resource Strain (CARDIA)    • Difficulty of Paying Living Expenses: Not hard at all   Food Insecurity: No Food Insecurity (9/2/2024)    Nursing - Inadequate Food Risk Classification    • Worried About Running Out of Food in the Last Year: Never true    • Ran Out of Food in the Last Year: Never true    • Ran Out of Food in the Last Year: Not on file   Transportation Needs: No Transportation Needs (9/2/2024)    PRAPARE - Transportation    • Lack of Transportation (Medical): No    • Lack of Transportation (Non-Medical): No   Physical Activity: Not on file   Stress: Not on file   Social Connections: Not on file   Intimate Partner Violence: Not on file   Housing Stability: Low Risk  (9/2/2024)    Housing Stability Vital Sign    • Unable to Pay for Housing in the Last Year: No    • Number of Times Moved in the Last Year: 0    • Homeless in the Last Year: No       Current Outpatient Medications:   •  Calcium Ascorbate (VITAMIN C) 500 mg tablet, Take 500 mg by mouth daily, Disp: , Rfl:   •  Calcium Carb-Cholecalciferol (CALCIUM 1000 + D PO), Take 1 tablet by mouth daily, Disp: , Rfl:   •  Lysine 500 MG CAPS, Take 1 capsule by mouth daily, Disp: , Rfl:   •  Multiple Vitamin (MULTIVITAMIN ADULT PO), Take 1 tablet by mouth daily, Disp: , Rfl:   •  rosuvastatin (CRESTOR) 10 MG tablet, Take 1 tablet (10 mg total) by mouth daily, Disp: 100 tablet, Rfl: 1  •  Vitamin D, Cholecalciferol, 50 MCG (2000 UT) CAPS, Take 1 tablet by mouth daily, Disp: , Rfl:   •  vitamin E, tocopherol, 400 units capsule, Take 400 Units by mouth daily, Disp: , Rfl:   •  LORazepam (Ativan) 0.5 mg  tablet, Take one tablet 30 min before the MRI, then one tablet immediately before the MRI. Do not drive. (Patient not taking: Reported on 10/18/2024), Disp: 2 tablet, Rfl: 0  Allergies   Allergen Reactions   • Atorvastatin Arthralgia       Physical Exam:     Vitals:    10/23/24 1429   BP: 134/72   Pulse: 88   Temp: 97.9 °F (36.6 °C)   SpO2: 97%     Physical Exam  Constitutional:       Appearance: Normal appearance.   HENT:      Head: Normocephalic and atraumatic.      Nose: Nose normal.      Mouth/Throat:      Mouth: Mucous membranes are moist.   Eyes:      Pupils: Pupils are equal, round, and reactive to light.   Cardiovascular:      Rate and Rhythm: Normal rate.      Pulses: Normal pulses.      Heart sounds: Normal heart sounds.   Pulmonary:      Effort: Pulmonary effort is normal.      Breath sounds: Normal breath sounds.   Chest:          Comments: Lateral breast examination no palpable mass masses nipple discharge nipple retraction or skin changes.  Bilateral axillary and supraclavicular examination no palpable adenopathy.  Patient was examined seated as well as supine position.  Abdominal:      General: Bowel sounds are normal.      Palpations: Abdomen is soft.   Musculoskeletal:         General: Normal range of motion.      Cervical back: Normal range of motion and neck supple.   Skin:     General: Skin is warm.   Neurological:      General: No focal deficit present.      Mental Status: She is alert and oriented to person, place, and time.   Psychiatric:         Mood and Affect: Mood normal.         Behavior: Behavior normal.         Thought Content: Thought content normal.         Judgment: Judgment normal.       Results & Discussion:   I did review pathology in detail invasive ductal as well as a lobular features.  We also discussed receptor status.  Surgical consent was obtained after explaining benefits, alternative, procedure and possible complications with regards above mentioned procedure. All her  questions regarding the visit  as well as the  surgery were answered to  the patient's satisfaction. she understands and  agrees . All patient questions were answered.       Advance Care Planning/Advance Directives:  I Sha Walden MD discussed the disease status with Samaria Barger  today 10/23/24  treatment plans and follow-up with the patient.

## 2024-10-24 ENCOUNTER — HOME HEALTH ADMISSION (OUTPATIENT)
Dept: HOME HEALTH SERVICES | Facility: HOME HEALTHCARE | Age: 74
End: 2024-10-24
Payer: MEDICARE

## 2024-10-24 ENCOUNTER — PATIENT OUTREACH (OUTPATIENT)
Dept: CASE MANAGEMENT | Facility: HOSPITAL | Age: 74
End: 2024-10-24

## 2024-10-24 ENCOUNTER — CLINICAL SUPPORT (OUTPATIENT)
Dept: OBGYN CLINIC | Facility: OTHER | Age: 74
End: 2024-10-24

## 2024-10-24 ENCOUNTER — TELEPHONE (OUTPATIENT)
Dept: OBGYN CLINIC | Facility: OTHER | Age: 74
End: 2024-10-24

## 2024-10-24 DIAGNOSIS — Z17.0 MALIGNANT NEOPLASM OF UPPER-INNER QUADRANT OF RIGHT BREAST IN FEMALE, ESTROGEN RECEPTOR POSITIVE (HCC): Primary | ICD-10-CM

## 2024-10-24 DIAGNOSIS — C50.211 MALIGNANT NEOPLASM OF UPPER-INNER QUADRANT OF RIGHT BREAST IN FEMALE, ESTROGEN RECEPTOR POSITIVE (HCC): Primary | ICD-10-CM

## 2024-10-24 LAB
DME PARACHUTE DELIVERY DATE REQUESTED: NORMAL
DME PARACHUTE ITEM DESCRIPTION: NORMAL
DME PARACHUTE ORDER STATUS: NORMAL
DME PARACHUTE SUPPLIER NAME: NORMAL
DME PARACHUTE SUPPLIER PHONE: NORMAL

## 2024-10-24 NOTE — PROGRESS NOTES
Mastectomy Bra Fitting Order Details    Samaria Barger  1950  [id number]    Reason For Visit  Post op bra     Precautions   History of breast cancer     Subjective  Samaria arrived to be fitted for a second post op garment. She is scheduled for a bilateral mastectomy on 10/28/2024 without reconstruction.     Surgery Type: Mastectomy    Lymph node removal : unknown     Date of surgery 10/28/2024    Surgical side bilateral    Objective  Reviewed post op garment options, drain management system and prosthesis for 6-8 weeks post op.     Assessment  Band - 18 x 2 = 36 + 4 = 40     Plan  Ship to home day after surgery. Samaria was instructed on the wear and care of her products.     Order Details   Astrid camisole XL C/D x 2 -

## 2024-10-24 NOTE — PROGRESS NOTES
Pt will have a mastectomy 10/28, [facility] has accepted for post op drain care.  Inpt CM supervisor and office RN are aware via email.  No other needs noted at this time.

## 2024-10-24 NOTE — TELEPHONE ENCOUNTER
Mack Mera to schedule at the [facility] dean at the [place] location. Call back number [phone]

## 2024-10-25 NOTE — PRE-PROCEDURE INSTRUCTIONS
Pre-Surgery Instructions:   Medication Instructions    Calcium Ascorbate (VITAMIN C) 500 mg tablet Last dose 10/24    Calcium Carb-Cholecalciferol (CALCIUM 1000 + D PO) Last dose 10/24    Lysine 500 MG CAPS Last dose 10/24    Multiple Vitamin (MULTIVITAMIN ADULT PO) Last dose 10/24    rosuvastatin (CRESTOR) 10 MG tablet Take day of surgery.    Vitamin D, Cholecalciferol, 50 MCG (2000 UT) CAPS Last dose 10/24    vitamin E, tocopherol, 400 units capsule Last dose 10/24        Medication instructions for day surgery reviewed. Please use only a sip of water to take your instructed medications. Avoid all over the counter vitamins, supplements and NSAIDS for one week prior to surgery per anesthesia guidelines. Tylenol is ok to take as needed.     You will receive a call one business day prior to surgery with an arrival time and hospital directions. If your surgery is scheduled on a Monday, the hospital will be calling you on the Friday prior to your surgery. If you have not heard from anyone by 8pm, please call the hospital supervisor through the hospital  at [phone]. ([place] [phone] or [place] [phone]).    Do not eat or drink anything after midnight the night before your surgery, including candy, mints, lifesavers, or chewing gum. Do not drink alcohol 24hrs before your surgery. Try not to smoke at least 24hrs before your surgery.       Follow the pre surgery showering instructions as listed in the “My Surgical Experience Booklet” or otherwise provided by your surgeon's office. Do not use a blade to shave the surgical area 1 week before surgery. It is okay to use a clean electric clippers up to 24 hours before surgery. Do not apply any lotions, creams, including makeup, cologne, deodorant, or perfumes after showering on the day of your surgery. Do not use dry shampoo, hair spray, hair gel, or any type of hair products.     No contact lenses, eye make-up, or artificial eyelashes. Remove nail  polish, including gel polish, and any artificial, gel, or acrylic nails if possible. Remove all jewelry including rings and body piercing jewelry.     Wear causal clothing that is easy to take on and off. Consider your type of surgery.    Keep any valuables, jewelry, piercings at home. Please bring any specially ordered equipment (sling, braces) if indicated.    Arrange for a responsible person to drive you to and from the hospital on the day of your surgery. Please confirm the visitor policy for the day of your procedure when you receive your phone call with an arrival time.     Call the surgeon's office with any new illnesses, exposures, or additional questions prior to surgery.    Please reference your “My Surgical Experience Booklet” for additional information to prepare for your upcoming surgery.

## 2024-10-26 ENCOUNTER — ANESTHESIA EVENT (OUTPATIENT)
Dept: PERIOP | Facility: HOSPITAL | Age: 74
End: 2024-10-26
Payer: MEDICARE

## 2024-10-28 ENCOUNTER — APPOINTMENT (OUTPATIENT)
Dept: RADIOLOGY | Facility: HOSPITAL | Age: 74
End: 2024-10-28
Payer: MEDICARE

## 2024-10-28 ENCOUNTER — HOSPITAL ENCOUNTER (OUTPATIENT)
Facility: HOSPITAL | Age: 74
Setting detail: OUTPATIENT SURGERY
Discharge: HOME | End: 2024-10-29
Attending: SURGERY | Admitting: SURGERY
Payer: MEDICARE

## 2024-10-28 ENCOUNTER — ANESTHESIA (OUTPATIENT)
Dept: PERIOP | Facility: HOSPITAL | Age: 74
End: 2024-10-28
Payer: MEDICARE

## 2024-10-28 ENCOUNTER — HOSPITAL ENCOUNTER (OUTPATIENT)
Dept: NUCLEAR MEDICINE | Facility: HOSPITAL | Age: 74
Discharge: HOME | End: 2024-10-28
Attending: SURGERY
Payer: MEDICARE

## 2024-10-28 VITALS
OXYGEN SATURATION: 94 % | DIASTOLIC BLOOD PRESSURE: 60 MMHG | WEIGHT: 200.18 LBS | RESPIRATION RATE: 16 BRPM | TEMPERATURE: 97.5 F | HEIGHT: 65 IN | BODY MASS INDEX: 33.35 KG/M2 | SYSTOLIC BLOOD PRESSURE: 121 MMHG | HEART RATE: 80 BPM

## 2024-10-28 DIAGNOSIS — C50.211 MALIGNANT NEOPLASM OF UPPER-INNER QUADRANT OF RIGHT BREAST IN FEMALE, ESTROGEN RECEPTOR POSITIVE (HCC): ICD-10-CM

## 2024-10-28 DIAGNOSIS — Z17.0 MALIGNANT NEOPLASM OF UPPER-INNER QUADRANT OF RIGHT BREAST IN FEMALE, ESTROGEN RECEPTOR POSITIVE (HCC): ICD-10-CM

## 2024-10-28 PROCEDURE — 38792 RA TRACER ID OF SENTINL NODE: CPT | Performed by: SURGERY

## 2024-10-28 PROCEDURE — 19307 MAST MOD RAD: CPT | Performed by: PHYSICIAN ASSISTANT

## 2024-10-28 PROCEDURE — 19303 MAST SIMPLE COMPLETE: CPT | Performed by: PHYSICIAN ASSISTANT

## 2024-10-28 PROCEDURE — 88342 IMHCHEM/IMCYTCHM 1ST ANTB: CPT | Performed by: STUDENT IN AN ORGANIZED HEALTH CARE EDUCATION/TRAINING PROGRAM

## 2024-10-28 PROCEDURE — A9541 TC99M SULFUR COLLOID: HCPCS

## 2024-10-28 PROCEDURE — 88307 TISSUE EXAM BY PATHOLOGIST: CPT | Performed by: STUDENT IN AN ORGANIZED HEALTH CARE EDUCATION/TRAINING PROGRAM

## 2024-10-28 PROCEDURE — 88341 IMHCHEM/IMCYTCHM EA ADD ANTB: CPT | Performed by: STUDENT IN AN ORGANIZED HEALTH CARE EDUCATION/TRAINING PROGRAM

## 2024-10-28 PROCEDURE — 19303 MAST SIMPLE COMPLETE: CPT | Performed by: SURGERY

## 2024-10-28 PROCEDURE — 38792 RA TRACER ID OF SENTINL NODE: CPT

## 2024-10-28 PROCEDURE — 19307 MAST MOD RAD: CPT | Performed by: SURGERY

## 2024-10-28 PROCEDURE — 38900 IO MAP OF SENT LYMPH NODE: CPT | Performed by: SURGERY

## 2024-10-28 RX ORDER — KETOROLAC TROMETHAMINE 30 MG/ML
INJECTION, SOLUTION INTRAMUSCULAR; INTRAVENOUS AS NEEDED
Status: DISCONTINUED | OUTPATIENT
Start: 2024-10-28 | End: 2024-10-28

## 2024-10-28 RX ORDER — EPHEDRINE SULFATE 50 MG/ML
INJECTION INTRAVENOUS AS NEEDED
Status: DISCONTINUED | OUTPATIENT
Start: 2024-10-28 | End: 2024-10-28

## 2024-10-28 RX ORDER — ACETAMINOPHEN 10 MG/ML
INJECTION, SOLUTION INTRAVENOUS AS NEEDED
Status: DISCONTINUED | OUTPATIENT
Start: 2024-10-28 | End: 2024-10-28

## 2024-10-28 RX ORDER — FENTANYL CITRATE/PF 50 MCG/ML
50 SYRINGE (ML) INJECTION
Status: DISCONTINUED | OUTPATIENT
Start: 2024-10-28 | End: 2024-10-28 | Stop reason: HOSPADM

## 2024-10-28 RX ORDER — PROPOFOL 10 MG/ML
INJECTION, EMULSION INTRAVENOUS CONTINUOUS PRN
Status: DISCONTINUED | OUTPATIENT
Start: 2024-10-28 | End: 2024-10-28

## 2024-10-28 RX ORDER — DEXAMETHASONE SODIUM PHOSPHATE 10 MG/ML
INJECTION, SOLUTION INTRAMUSCULAR; INTRAVENOUS AS NEEDED
Status: DISCONTINUED | OUTPATIENT
Start: 2024-10-28 | End: 2024-10-28

## 2024-10-28 RX ORDER — HYDROMORPHONE HCL/PF 1 MG/ML
SYRINGE (ML) INJECTION AS NEEDED
Status: DISCONTINUED | OUTPATIENT
Start: 2024-10-28 | End: 2024-10-28

## 2024-10-28 RX ORDER — OXYCODONE HYDROCHLORIDE 5 MG/1
5 TABLET ORAL EVERY 4 HOURS PRN
Status: DISCONTINUED | OUTPATIENT
Start: 2024-10-28 | End: 2024-10-29 | Stop reason: HOSPADM

## 2024-10-28 RX ORDER — PROPOFOL 10 MG/ML
INJECTION, EMULSION INTRAVENOUS AS NEEDED
Status: DISCONTINUED | OUTPATIENT
Start: 2024-10-28 | End: 2024-10-28

## 2024-10-28 RX ORDER — CEFAZOLIN SODIUM 2 G/50ML
2000 SOLUTION INTRAVENOUS ONCE
Status: COMPLETED | OUTPATIENT
Start: 2024-10-28 | End: 2024-10-28

## 2024-10-28 RX ORDER — TRAMADOL HYDROCHLORIDE 50 MG/1
50 TABLET ORAL EVERY 6 HOURS PRN
Status: DISCONTINUED | OUTPATIENT
Start: 2024-10-28 | End: 2024-10-29 | Stop reason: HOSPADM

## 2024-10-28 RX ORDER — SODIUM CHLORIDE, SODIUM LACTATE, POTASSIUM CHLORIDE, CALCIUM CHLORIDE 600; 310; 30; 20 MG/100ML; MG/100ML; MG/100ML; MG/100ML
100 INJECTION, SOLUTION INTRAVENOUS CONTINUOUS
Status: DISCONTINUED | OUTPATIENT
Start: 2024-10-28 | End: 2024-10-29 | Stop reason: HOSPADM

## 2024-10-28 RX ORDER — MIDAZOLAM HYDROCHLORIDE 2 MG/2ML
INJECTION, SOLUTION INTRAMUSCULAR; INTRAVENOUS AS NEEDED
Status: DISCONTINUED | OUTPATIENT
Start: 2024-10-28 | End: 2024-10-28

## 2024-10-28 RX ORDER — SUCCINYLCHOLINE/SOD CL,ISO/PF 100 MG/5ML
SYRINGE (ML) INTRAVENOUS AS NEEDED
Status: DISCONTINUED | OUTPATIENT
Start: 2024-10-28 | End: 2024-10-28

## 2024-10-28 RX ORDER — LANOLIN ALCOHOL/MO/W.PET/CERES
3 CREAM (GRAM) TOPICAL
Status: DISCONTINUED | OUTPATIENT
Start: 2024-10-28 | End: 2024-10-29 | Stop reason: HOSPADM

## 2024-10-28 RX ORDER — HYDRALAZINE HYDROCHLORIDE 20 MG/ML
5 INJECTION INTRAMUSCULAR; INTRAVENOUS EVERY 6 HOURS PRN
Status: DISCONTINUED | OUTPATIENT
Start: 2024-10-28 | End: 2024-10-29 | Stop reason: HOSPADM

## 2024-10-28 RX ORDER — FENTANYL CITRATE 50 UG/ML
INJECTION, SOLUTION INTRAMUSCULAR; INTRAVENOUS AS NEEDED
Status: DISCONTINUED | OUTPATIENT
Start: 2024-10-28 | End: 2024-10-28

## 2024-10-28 RX ORDER — SODIUM CHLORIDE, SODIUM LACTATE, POTASSIUM CHLORIDE, CALCIUM CHLORIDE 600; 310; 30; 20 MG/100ML; MG/100ML; MG/100ML; MG/100ML
INJECTION, SOLUTION INTRAVENOUS CONTINUOUS PRN
Status: DISCONTINUED | OUTPATIENT
Start: 2024-10-28 | End: 2024-10-28

## 2024-10-28 RX ORDER — ONDANSETRON 2 MG/ML
INJECTION INTRAMUSCULAR; INTRAVENOUS AS NEEDED
Status: DISCONTINUED | OUTPATIENT
Start: 2024-10-28 | End: 2024-10-28

## 2024-10-28 RX ORDER — LIDOCAINE HYDROCHLORIDE 10 MG/ML
INJECTION, SOLUTION EPIDURAL; INFILTRATION; INTRACAUDAL; PERINEURAL AS NEEDED
Status: DISCONTINUED | OUTPATIENT
Start: 2024-10-28 | End: 2024-10-28

## 2024-10-28 RX ORDER — ONDANSETRON 2 MG/ML
4 INJECTION INTRAMUSCULAR; INTRAVENOUS EVERY 6 HOURS PRN
Status: DISCONTINUED | OUTPATIENT
Start: 2024-10-28 | End: 2024-10-29 | Stop reason: HOSPADM

## 2024-10-28 RX ORDER — ONDANSETRON 2 MG/ML
4 INJECTION INTRAMUSCULAR; INTRAVENOUS ONCE AS NEEDED
Status: DISCONTINUED | OUTPATIENT
Start: 2024-10-28 | End: 2024-10-28 | Stop reason: HOSPADM

## 2024-10-28 RX ORDER — HYDROMORPHONE HCL IN WATER/PF 6 MG/30 ML
0.2 PATIENT CONTROLLED ANALGESIA SYRINGE INTRAVENOUS
Status: DISCONTINUED | OUTPATIENT
Start: 2024-10-28 | End: 2024-10-29 | Stop reason: HOSPADM

## 2024-10-28 RX ORDER — ACETAMINOPHEN 325 MG/1
650 TABLET ORAL EVERY 6 HOURS SCHEDULED
Status: DISCONTINUED | OUTPATIENT
Start: 2024-10-28 | End: 2024-10-29 | Stop reason: HOSPADM

## 2024-10-28 RX ADMIN — FENTANYL CITRATE 50 MCG: 50 INJECTION INTRAMUSCULAR; INTRAVENOUS at 14:11

## 2024-10-28 RX ADMIN — MIDAZOLAM 0.5 MG: 1 INJECTION INTRAMUSCULAR; INTRAVENOUS at 14:00

## 2024-10-28 RX ADMIN — PROPOFOL 150 MG: 10 INJECTION, EMULSION INTRAVENOUS at 14:13

## 2024-10-28 RX ADMIN — EPHEDRINE SULFATE 5 MG: 50 INJECTION, SOLUTION INTRAVENOUS at 14:31

## 2024-10-28 RX ADMIN — SODIUM CHLORIDE, SODIUM LACTATE, POTASSIUM CHLORIDE, AND CALCIUM CHLORIDE 100 ML/HR: .6; .31; .03; .02 INJECTION, SOLUTION INTRAVENOUS at 18:23

## 2024-10-28 RX ADMIN — PROPOFOL 30 MCG/KG/MIN: 10 INJECTION, EMULSION INTRAVENOUS at 14:15

## 2024-10-28 RX ADMIN — LIDOCAINE HYDROCHLORIDE 50 MG: 10 INJECTION, SOLUTION EPIDURAL; INFILTRATION; INTRACAUDAL at 14:11

## 2024-10-28 RX ADMIN — FENTANYL CITRATE 50 MCG: 50 INJECTION INTRAMUSCULAR; INTRAVENOUS at 14:19

## 2024-10-28 RX ADMIN — MIDAZOLAM 1 MG: 1 INJECTION INTRAMUSCULAR; INTRAVENOUS at 15:00

## 2024-10-28 RX ADMIN — ACETAMINOPHEN 650 MG: 325 TABLET, FILM COATED ORAL at 18:19

## 2024-10-28 RX ADMIN — ACETAMINOPHEN 1000 MG: 10 INJECTION INTRAVENOUS at 14:45

## 2024-10-28 RX ADMIN — KETOROLAC TROMETHAMINE 15 MG: 30 INJECTION, SOLUTION INTRAMUSCULAR at 15:52

## 2024-10-28 RX ADMIN — PROPOFOL 50 MG: 10 INJECTION, EMULSION INTRAVENOUS at 14:33

## 2024-10-28 RX ADMIN — HYDROMORPHONE HYDROCHLORIDE 0.5 MG: 1 INJECTION, SOLUTION INTRAMUSCULAR; INTRAVENOUS; SUBCUTANEOUS at 15:22

## 2024-10-28 RX ADMIN — TRAMADOL HYDROCHLORIDE 50 MG: 50 TABLET, COATED ORAL at 21:53

## 2024-10-28 RX ADMIN — DEXAMETHASONE SODIUM PHOSPHATE 10 MG: 10 INJECTION, SOLUTION INTRAMUSCULAR; INTRAVENOUS at 14:15

## 2024-10-28 RX ADMIN — CEFAZOLIN SODIUM 2000 MG: 2 SOLUTION INTRAVENOUS at 14:08

## 2024-10-28 RX ADMIN — MIDAZOLAM 0.5 MG: 1 INJECTION INTRAMUSCULAR; INTRAVENOUS at 14:08

## 2024-10-28 RX ADMIN — DEXMEDETOMIDINE HYDROCHLORIDE 4 MCG: 100 INJECTION, SOLUTION, CONCENTRATE INTRAVENOUS at 15:24

## 2024-10-28 RX ADMIN — FENTANYL CITRATE 50 MCG: 50 INJECTION INTRAMUSCULAR; INTRAVENOUS at 16:45

## 2024-10-28 RX ADMIN — PROPOFOL 40 MG: 10 INJECTION, EMULSION INTRAVENOUS at 15:23

## 2024-10-28 RX ADMIN — HYDROMORPHONE HYDROCHLORIDE 0.5 MG: 1 INJECTION, SOLUTION INTRAMUSCULAR; INTRAVENOUS; SUBCUTANEOUS at 14:50

## 2024-10-28 RX ADMIN — ONDANSETRON 4 MG: 2 INJECTION INTRAMUSCULAR; INTRAVENOUS at 14:48

## 2024-10-28 RX ADMIN — Medication 3 MG: at 21:53

## 2024-10-28 RX ADMIN — ONDANSETRON 4 MG: 2 INJECTION INTRAMUSCULAR; INTRAVENOUS at 21:53

## 2024-10-28 RX ADMIN — SODIUM CHLORIDE, SODIUM LACTATE, POTASSIUM CHLORIDE, AND CALCIUM CHLORIDE: .6; .31; .03; .02 INJECTION, SOLUTION INTRAVENOUS at 14:00

## 2024-10-28 RX ADMIN — Medication 100 MG: at 14:13

## 2024-10-28 RX ADMIN — DEXMEDETOMIDINE HYDROCHLORIDE 4 MCG: 100 INJECTION, SOLUTION, CONCENTRATE INTRAVENOUS at 14:45

## 2024-10-28 NOTE — OP NOTE
OPERATIVE REPORT  PATIENT NAME: Samaria Barger    :  1950  MRN: [id number]  Pt Location: [facility] OR ROOM 05    SURGERY DATE: 10/28/2024    Surgeons and Role:     * Sha Shabazz MD - Primary     * Jayshree Haile PA-C - Assisting    Preop Diagnosis:  Malignant neoplasm of upper-inner quadrant of right breast in female, estrogen receptor positive (HCC) [C50.211, Z17.0]    Post-Op Diagnosis Codes:     * Malignant neoplasm of upper-inner quadrant of right breast in female, estrogen receptor positive (HCC) [C50.211, Z17.0]    Procedure(s):  Bilateral - RIGHT BREAST MASTECTOMY. LEFT TOTAL MASTECTOMY. DARRON CLIPS IN THE BREASTS  Right - LYMPHATIC MAPPING WITH BLUE AND RADIOACTIVE DYES. SENTINEL LYMPH NODE BIOPSY. INJECTION IN OR AT 1330 BY DR SHABAZZ    Specimen(s):  ID Type Source Tests Collected by Time Destination   1 : right axillary sentinel lymph node #1 hot and blue Tissue Lymph Node, [place] TISSUE EXAM Sha Shabazz MD 10/28/2024 1449    2 : RIGHT BREAST MASTECTOMY SUTURE [place] AXILLARY TAIL Tissue Breast, Right TISSUE EXAM Sha Shabazz MD 10/28/2024 1450    3 : RIGHT AXILLARY PALPABLE LYMPH NODE Tissue Axillary lymph node TISSUE EXAM Sha Shabazz MD 10/28/2024 1451    4 : LEFT BREAST MASTECTOMY SUTURE [place] AXILLARY TAIL Tissue Breast, Left TISSUE EXAM Sha Shabazz MD 10/28/2024 1535        Estimated Blood Loss:   Minimal    Drains:  Closed/Suction Drain Inferior;Right Breast Bulb 19 Fr. (Active)   Number of days: 0       Closed/Suction Drain Inferior;Right Breast Bulb 19 Fr. (Active)   Number of days: 0       Anesthesia Type:   General    Operative Indications:  Malignant neoplasm of upper-inner quadrant of right breast in female, estrogen receptor positive (HCC) [C50.211, Z17.0]      Operative Findings:  Right breast Darron clips the specimen      Complications:   None    Procedure and Technique:  The patient was brought to the operation room and placed  under general anesthesia.  Attention was paid to ensure appropriate padding and correct positioning.  The right  breast was injected intradermally with 0.3cc of methylene blue, radioactive dye  and then  both breast  were prepped and draped in a sterile fashion.  I initiated a time-out, identifying the patient, the correct side and the above procedure.  All parties agreed with the time out.     Right mastectomy with SLN biopsy:   The incision was sharply incised.  Thin flaps were then elevated using electrocautery starting superiorly and then continuing medially, inferiorly and finally laterally.  The tail of Valentine was then dissected away from the axilla proper leaving the breast tethered to the underlying musculature.      The sentinel lymph node was identified and removed.  The node was grossly normal.  There were no other radioactive, blue or enlarged lymph nodes in the axilla.   The breast was then removed from the muscles in a sub-facial plane.  The breast was oriented with suture axillary  tail.the breast was sent to pathology in formalin for permanent pathologic evaluation. Meticulous hemostasis was maintained with electrocautery.  The wound was extensively irrigated and two 19mm, slotted, round AKOSUA drains were placed and brought out through separate incisions and secured with nylon sutures.  The wound was then closed in two layers - an inner layer of 2-0 vicryl and a subcuticular closure with 4-0 Monocryl.  Exofin were applied.  The counts were correct x2.    Left Mastectomy:   The  planned incision was sharply incised.  Thin flaps were then elevated using electrocautery starting superiorly and then continuing medially, inferiorly and finally laterally.  The tail of Valentine was then dissected away from the axilla proper leaving the breast tethered to the underlying musculature.  The breast was then removed from the muscles in a sub-facial plane. The breast was oriented with suture axillary tail.The breast was  sent to pathology in formalin for permanent pathologic evaluation.  Meticulous hemostasis was maintained with electrocautery.  The wound was extensively irrigated and two 19mm, slotted, round AKOSUA drains were placed and brought out through separate incisions and secured with nylon sutures.  The wound was then closed in two layers - an inner layer of 2-0 vicryl and a subcuticular closure with 4-0 Monocryl.  Exofin were applied.    The counts were correct x 2.       I was present for the entire procedure. and A physician assistant was required during the procedure for retraction, tissue handling, dissection and suturing.    Patient Disposition:  PACU     [place] Node Biopsy for Breast Cancer - Right  Operation performed with curative intent. Yes   Tracer(s) used to identify sentinel nodes in the upfront surgery (non-neoadjuvant) setting (select all that apply). Dye and Radioactive tracer   Tracer(s) used to identify sentinel nodes in the neoadjuvant setting (select all that apply). N/A   All nodes (colored or non-colored) present at the end of a dye-filled lymphatic channel were removed. Yes   All significantly radioactive nodes were removed. Yes   All palpably suspicious nodes were removed. Yes   Biopsy-proven positive nodes marked with clips prior to chemotherapy were identified and removed. N/A                 SIGNATURE: Sha Walden MD  DATE: October 28, 2024  TIME: 3:47 PM

## 2024-10-28 NOTE — PLAN OF CARE
Problem: PAIN - ADULT  Goal: Verbalizes/displays adequate comfort level or baseline comfort level  Description: Interventions:  - Encourage patient to monitor pain and request assistance  - Assess pain using appropriate pain scale  - Administer analgesics based on type and severity of pain and evaluate response  - Implement non-pharmacological measures as appropriate and evaluate response  - Consider cultural and social influences on pain and pain management  - Notify physician/advanced practitioner if interventions unsuccessful or patient reports new pain  Outcome: Progressing     Problem: INFECTION - ADULT  Goal: Absence or prevention of progression during hospitalization  Description: INTERVENTIONS:  - Assess and monitor for signs and symptoms of infection  - Monitor lab/diagnostic results  - Monitor all insertion sites, i.e. indwelling lines, tubes, and drains  - Monitor endotracheal if appropriate and nasal secretions for changes in amount and color  - [place] appropriate cooling/warming therapies per order  - Administer medications as ordered  - Instruct and encourage patient and family to use good hand hygiene technique  - Identify and instruct in appropriate isolation precautions for identified infection/condition  Outcome: Progressing  Goal: Absence of fever/infection during neutropenic period  Description: INTERVENTIONS:  - Monitor WBC    Outcome: Progressing     Problem: SAFETY ADULT  Goal: Patient will remain free of falls  Description: INTERVENTIONS:  - Educate patient/family on patient safety including physical limitations  - Instruct patient to call for assistance with activity   - Consult OT/PT to assist with strengthening/mobility   - Keep Call bell within reach  - Keep bed low and locked with side rails adjusted as appropriate  - Keep care items and personal belongings within reach  - Initiate and maintain comfort rounds  - Make Fall Risk Sign visible to staff  - Offer Toileting every 2 Hours,  in advance of need  - Initiate/Maintain bed alarm  - Obtain necessary fall risk management equipment: call bell within reach  - Apply yellow socks and bracelet for high fall risk patients  - Consider moving patient to room near nurses station  Outcome: Progressing  Goal: Maintain or return to baseline ADL function  Description: INTERVENTIONS:  -  Assess patient's ability to carry out ADLs; assess patient's baseline for ADL function and identify physical deficits which impact ability to perform ADLs (bathing, care of mouth/teeth, toileting, grooming, dressing, etc.)  - Assess/evaluate cause of self-care deficits   - Assess range of motion  - Assess patient's mobility; develop plan if impaired  - Assess patient's need for assistive devices and provide as appropriate  - Encourage maximum independence but intervene and supervise when necessary  - Involve family in performance of ADLs  - Assess for home care needs following discharge   - Consider OT consult to assist with ADL evaluation and planning for discharge  - Provide patient education as appropriate  Outcome: Progressing  Goal: Maintains/Returns to pre admission functional level  Description: INTERVENTIONS:  - Perform AM-PAC 6 Click Basic Mobility/ Daily Activity assessment daily.  - Set and communicate daily mobility goal to care team and patient/family/caregiver.   - Collaborate with rehabilitation services on mobility goals if consulted  - Perform Range of Motion 3 times a day.  - Reposition patient every 2 hours.  - Dangle patient 3 times a day  - Stand patient 3 times a day  - Ambulate patient 3 times a day  - Out of bed to chair 3 times a day   - Out of bed for meals 3 times a day  - Out of bed for toileting  - Record patient progress and toleration of activity level   Outcome: Progressing     Problem: DISCHARGE PLANNING  Goal: Discharge to home or other facility with appropriate resources  Description: INTERVENTIONS:  - Identify barriers to discharge  w/patient and caregiver  - Arrange for needed discharge resources and transportation as appropriate  - Identify discharge learning needs (meds, wound care, etc.)  - Arrange for interpretive services to assist at discharge as needed  - Refer to Case Management Department for coordinating discharge planning if the patient needs post-hospital services based on physician/advanced practitioner order or complex needs related to functional status, cognitive ability, or social support system  Outcome: Progressing     Problem: Knowledge Deficit  Goal: Patient/family/caregiver demonstrates understanding of disease process, treatment plan, medications, and discharge instructions  Description: Complete learning assessment and assess knowledge base.  Interventions:  - Provide teaching at level of understanding  - Provide teaching via preferred learning methods  Outcome: Progressing

## 2024-10-28 NOTE — ANESTHESIA POSTPROCEDURE EVALUATION
Post-Op Assessment Note    CV Status:  Stable  Pain Score: 1    Pain management: adequate       Mental Status:  Alert and awake   Hydration Status:  Euvolemic and stable   PONV Controlled:  Controlled   Airway Patency:  Patent     Post Op Vitals Reviewed: Yes    No anethesia notable event occurred.    Staff: Anesthesiologist           Last Filed PACU Vitals:  Vitals Value Taken Time   Temp 97.6 °F (36.4 °C) 10/28/24 1628   Pulse 77 10/28/24 1702   /68 10/28/24 1700   Resp 10 10/28/24 1702   SpO2 94 % 10/28/24 1702   Vitals shown include unfiled device data.    Modified Allison:  Activity: 2 (10/28/2024  5:00 PM)  Respiration: 2 (10/28/2024  5:00 PM)  Circulation: 2 (10/28/2024  5:00 PM)  Consciousness: 1 (10/28/2024  5:00 PM)  Oxygen Saturation: 1 (10/28/2024  5:00 PM)  Modified Allison Score: 8 (10/28/2024  5:00 PM)

## 2024-10-28 NOTE — ANESTHESIA PREPROCEDURE EVALUATION
Procedure:  RIGHT BREAST MASTECTOMY, LEFT TOTAL MASTECTOMY, DARRON CLIPS IN THE BREASTS (Bilateral: Breast)  LYMPHATIC MAPPING WITH BLUE AND RADIOACTIVE DYES, SENTINEL LYMPH NODE BIOPSY, INJECTION IN OR AT 1330 BY DR SHABAZZ (Right: Axilla)  POSSIBLE AXILLARY DISSECTION (Right: Axilla)    Relevant Problems   ANESTHESIA (within normal limits)      CARDIO   (+) Mixed hyperlipidemia      ENDO (within normal limits)      GYN   (+) Malignant neoplasm of upper-inner quadrant of right breast in female, estrogen receptor positive (HCC)      PULMONARY (within normal limits)        Physical Exam    Airway    Mallampati score: II  TM Distance: >3 FB  Neck ROM: full     Dental   No notable dental hx     Cardiovascular  Rhythm: regular, Rate: normal    Pulmonary   Breath sounds clear to auscultation    Other Findings  post-pubertal.      Anesthesia Plan  ASA Score- 2     Anesthesia Type- general with ASA Monitors.         Additional Monitors:     Airway Plan:            Plan Factors-Exercise tolerance (METS): >4 METS.    Chart reviewed. EKG reviewed.  Existing labs reviewed. Patient summary reviewed.    Patient is not a current smoker.              Induction- intravenous.    Postoperative Plan- Plan for postoperative opioid use. Planned trial extubation        Informed Consent- Anesthetic plan and risks discussed with patient.  I personally reviewed this patient with the CRNA. Discussed and agreed on the Anesthesia Plan with the CRNA..

## 2024-10-28 NOTE — INTERVAL H&P NOTE
H&P reviewed. After examining the patient I find no changes in the patients condition since the H&P had been written.    Vitals:    10/28/24 1126   BP: 155/73   Pulse: 97   Resp: 20   Temp: 98 °F (36.7 °C)   SpO2: 94%

## 2024-10-29 ENCOUNTER — ANCILLARY PROCEDURE (OUTPATIENT)
Dept: LAB | Facility: HOSPITAL | Age: 74
End: 2024-10-29
Attending: SURGERY
Payer: MEDICARE

## 2024-10-29 ENCOUNTER — TELEPHONE (OUTPATIENT)
Age: 74
End: 2024-10-29

## 2024-10-29 PROCEDURE — 99024 POSTOP FOLLOW-UP VISIT: CPT | Performed by: SURGERY

## 2024-10-29 RX ADMIN — ACETAMINOPHEN 650 MG: 325 TABLET, FILM COATED ORAL at 00:53

## 2024-10-29 RX ADMIN — ACETAMINOPHEN 650 MG: 325 TABLET, FILM COATED ORAL at 06:10

## 2024-10-29 RX ADMIN — SODIUM CHLORIDE, SODIUM LACTATE, POTASSIUM CHLORIDE, AND CALCIUM CHLORIDE 100 ML/HR: .6; .31; .03; .02 INJECTION, SOLUTION INTRAVENOUS at 04:11

## 2024-10-29 NOTE — PROGRESS NOTES
"Progress Note - General Surgery   Samaria Barger 74 y.o. female MRN: [id number]  Unit/Bed#: -Josh Encounter: [id number]    Assessment/Plan  POD 1 Right Mastectomy, [place] Lymph Node Bx,  Left Total Mastectomy     Pt was seen and examined by Dr. Walden and discharge was determined appropriate.    -discharge to home.   -all instructions were given in writing and pre hospitalization education was provided in the office.    Chief Complaint: none       Objective/Exam: Blood pressure 121/60, pulse 80, temperature 97.5 °F (36.4 °C), resp. rate 16, height 5' 5\" (1.651 m), weight 90.8 kg (200 lb 2.8 oz), SpO2 94%.    Wound Culure: No results found for: \"WOUNDCULT\"    Exam per Dr. Walden   General Appearance:    Alert and orientated x 3, cooperative, no distress   Lungs:     Clear to auscultation bilaterally, respirations unlabored    Heart:    Regular rate and rhythm   Abdomen:    Soft  S/p bilateral mastectomy   AKOSUA drains x 2, s/s drainage  Dressing c/d/i          Extremities:   Extremities normal,  no cyanosis or edema   Pulses:   2+ and symmetric all extremities, no calf tenderness   Skin:   Skin color, texture, turgor normal, no rashes or lesions   Neurologic:   CNII-XII intact, normal strength, sensation and reflexes     Throughout, affect appropriate       ,      Intake/Output Summary (Last 24 hours) at 10/29/2024 0949  Last data filed at 10/29/2024 0700  Gross per 24 hour   Intake 920 ml   Output 20 ml   Net 900 ml       Invasive Devices       Peripheral Intravenous Line  Duration             Peripheral IV 10/28/24 Left Hand <1 day              Drain  Duration             Closed/Suction Drain Inferior;Left Breast Bulb 19 Fr. <1 day    Closed/Suction Drain Inferior;Left Breast Bulb 19 Fr. <1 day    Closed/Suction Drain Inferior;Right Breast Bulb 19 Fr. <1 day    Closed/Suction Drain Inferior;Right Breast Bulb 19 Fr. <1 day                                            Labs: CBC with diff: " "@RESUFAST(WBC,HGB,HCT,MCV,PLT,ADJUSTEDWBC,   RBC,MCH,MCHC,RDW,MPV,NRBC,TOTALCELLSCOUNTED,SEGS%,GRANS%,LYMPHS%,EOS%,BASO%,ABNEUT,ABGRANS,ABLYMPHS,ABMOMOS,ABEOS,ABBASO)@,   BMP/CMP:  Lab Results   Component Value Date    K 4.5 10/23/2024     10/23/2024    CO2 30 10/23/2024    BUN 18 10/23/2024    CREATININE 0.87 10/23/2024    CALCIUM 9.3 10/23/2024    AST 19 10/23/2024    ALT 22 10/23/2024    ALKPHOS 55 10/23/2024    EGFR 66 10/23/2024   ,   Lipid Panel: No results found for: \"CHOL\",   Coags:   Lab Results   Component Value Date    PTT 31 04/01/2020    INR 1.10 04/01/2020   ,     Blood Culture: No results found for: \"BLOODCX\",   Urinalysis: No results found for: \"COLORU\", \"CLARITYU\", \"SPECGRAV\", \"PHUR\", \"LEUKOCYTESUR\", \"NITRITE\", \"PROTEINUA\", \"GLUCOSEU\", \"KETONESU\", \"BILIRUBINUR\", \"BLOODU\",   Urine Culture: No results found for: \"URINECX\",         Imaging: NM sentinal node inj only    Result Date: 10/28/2024  Impression: Injection of 0.489 mCi Tc-99m filtered sulfur colloid  into the right breast in the OR. Workstation performed: [id number]         Tiffany Giordano PA-C  10/29/2024      "

## 2024-10-29 NOTE — PLAN OF CARE
Problem: PAIN - ADULT  Goal: Verbalizes/displays adequate comfort level or baseline comfort level  Description: Interventions:  - Encourage patient to monitor pain and request assistance  - Assess pain using appropriate pain scale  - Administer analgesics based on type and severity of pain and evaluate response  - Implement non-pharmacological measures as appropriate and evaluate response  - Consider cultural and social influences on pain and pain management  - Notify physician/advanced practitioner if interventions unsuccessful or patient reports new pain  Outcome: Progressing     Problem: INFECTION - ADULT  Goal: Absence or prevention of progression during hospitalization  Description: INTERVENTIONS:  - Assess and monitor for signs and symptoms of infection  - Monitor lab/diagnostic results  - Monitor all insertion sites, i.e. indwelling lines, tubes, and drains  - Monitor endotracheal if appropriate and nasal secretions for changes in amount and color  - [place] appropriate cooling/warming therapies per order  - Administer medications as ordered  - Instruct and encourage patient and family to use good hand hygiene technique  - Identify and instruct in appropriate isolation precautions for identified infection/condition  Outcome: Progressing  Goal: Absence of fever/infection during neutropenic period  Description: INTERVENTIONS:  - Monitor WBC    Outcome: Progressing     Problem: SAFETY ADULT  Goal: Patient will remain free of falls  Description: INTERVENTIONS:  - Educate patient/family on patient safety including physical limitations  - Instruct patient to call for assistance with activity   - Consult OT/PT to assist with strengthening/mobility   - Keep Call bell within reach  - Keep bed low and locked with side rails adjusted as appropriate  - Keep care items and personal belongings within reach  - Initiate and maintain comfort rounds  - Make Fall Risk Sign visible to staff  - Offer Toileting every  Hours,  in advance of need  - Initiate/Maintain alarm  - Obtain necessary fall risk management equipment:   - Apply yellow socks and bracelet for high fall risk patients  - Consider moving patient to room near nurses station  Outcome: Progressing  Goal: Maintain or return to baseline ADL function  Description: INTERVENTIONS:  -  Assess patient's ability to carry out ADLs; assess patient's baseline for ADL function and identify physical deficits which impact ability to perform ADLs (bathing, care of mouth/teeth, toileting, grooming, dressing, etc.)  - Assess/evaluate cause of self-care deficits   - Assess range of motion  - Assess patient's mobility; develop plan if impaired  - Assess patient's need for assistive devices and provide as appropriate  - Encourage maximum independence but intervene and supervise when necessary  - Involve family in performance of ADLs  - Assess for home care needs following discharge   - Consider OT consult to assist with ADL evaluation and planning for discharge  - Provide patient education as appropriate  Outcome: Progressing  Goal: Maintains/Returns to pre admission functional level  Description: INTERVENTIONS:  - Perform AM-PAC 6 Click Basic Mobility/ Daily Activity assessment daily.  - Set and communicate daily mobility goal to care team and patient/family/caregiver.   - Collaborate with rehabilitation services on mobility goals if consulted  - Perform Range of Motion  times a day.  - Reposition patient every  hours.  - Dangle patient  times a day  - Stand patient  times a day  - Ambulate patient  times a day  - Out of bed to chair  times a day   - Out of bed for meals times a day  - Out of bed for toileting  - Record patient progress and toleration of activity level   Outcome: Progressing     Problem: DISCHARGE PLANNING  Goal: Discharge to home or other facility with appropriate resources  Description: INTERVENTIONS:  - Identify barriers to discharge w/patient and caregiver  - Arrange for  needed discharge resources and transportation as appropriate  - Identify discharge learning needs (meds, wound care, etc.)  - Arrange for interpretive services to assist at discharge as needed  - Refer to Case Management Department for coordinating discharge planning if the patient needs post-hospital services based on physician/advanced practitioner order or complex needs related to functional status, cognitive ability, or social support system  Outcome: Progressing

## 2024-10-29 NOTE — TELEPHONE ENCOUNTER
Spoke with patient who wanted to check her appointment for [facility] home visit tomorrow as she is being discharged today. I confirmed with [facility] scheduling and let her know that [facility] Yancey will be seeing her tomorrow and will call her this afternoon to schedule an exact time. She verbalized understanding and will await call.

## 2024-10-29 NOTE — PLAN OF CARE
Problem: PAIN - ADULT  Goal: Verbalizes/displays adequate comfort level or baseline comfort level  Description: Interventions:  - Encourage patient to monitor pain and request assistance  - Assess pain using appropriate pain scale  - Administer analgesics based on type and severity of pain and evaluate response  - Implement non-pharmacological measures as appropriate and evaluate response  - Consider cultural and social influences on pain and pain management  - Notify physician/advanced practitioner if interventions unsuccessful or patient reports new pain  Outcome: Progressing     Problem: INFECTION - ADULT  Goal: Absence of fever/infection during neutropenic period  Description: INTERVENTIONS:  - Monitor WBC    Outcome: Progressing     Problem: DISCHARGE PLANNING  Goal: Discharge to home or other facility with appropriate resources  Description: INTERVENTIONS:  - Identify barriers to discharge w/patient and caregiver  - Arrange for needed discharge resources and transportation as appropriate  - Identify discharge learning needs (meds, wound care, etc.)  - Arrange for interpretive services to assist at discharge as needed  - Refer to Case Management Department for coordinating discharge planning if the patient needs post-hospital services based on physician/advanced practitioner order or complex needs related to functional status, cognitive ability, or social support system  Outcome: Progressing     Problem: Knowledge Deficit  Goal: Patient/family/caregiver demonstrates understanding of disease process, treatment plan, medications, and discharge instructions  Description: Complete learning assessment and assess knowledge base.  Interventions:  - Provide teaching at level of understanding  - Provide teaching via preferred learning methods  Outcome: Progressing     Problem: Prexisting or High Potential for Compromised Skin Integrity  Goal: Skin integrity is maintained or improved  Description: INTERVENTIONS:  -  Identify patients at risk for skin breakdown  - Assess and monitor skin integrity  - Assess and monitor nutrition and hydration status  - Monitor labs   - Assess for incontinence   - Turn and reposition patient  - Assist with mobility/ambulation  - Relieve pressure over bony prominences  - Avoid friction and shearing  - Provide appropriate hygiene as needed including keeping skin clean and dry  - Evaluate need for skin moisturizer/barrier cream  - Collaborate with interdisciplinary team   - Patient/family teaching  - Consider wound care consult   Outcome: Progressing

## 2024-10-29 NOTE — DISCHARGE INSTR - AVS FIRST PAGE
Thank you, it has been a pleasure taking care of you.     Call the Surgical office to make appointment for 2 weeks   Call your PCP for appointment in 1 week, please have this appointment completed prior to surgery follow up in case there were any changes to your medication while inpatient.    Meds:  If you do not need strong pain medicine you may take Tylenol 650 mg every 4 hours and ibuprofen 200 mg up to 800 mg every 8 hours as needed for pain. Space these medications apart by 4 hours. Do not take ibuprofen if you have reflux or GERD or gastric ulcer disease.Do not take Tylenol if you have liver disease.   If your pain is not relieved with these two medications then take the prescription pain medications.   Do not take acetaminophen (Tylenol) WITH  pain meds that contain acetaminophen. Take one or the other.  Do not exceed more than 4000 mg of acetaminophen in 24 hours  or 3000 mg if you have liver disease.     No driving until seen in the office in 2 weeks   No driving while taking pain meds.   No heavy lifting or strenuous exercise until you are cleared in the surgery office in 2 weeks   You may shower starting tomorrow. You can get the skin glue dressing wet, pat it dry. Do not pick off the skin glue or the mesh that it glued to your skin, it will flake off.    Empty the drain daily and record the output. Bring this to the office for your appointment       Call the office if you have increased pain not relieved with pain medicine.  Call the office if you have a fever,redness, the wound opens up, you have pus draining from your incision.   Take colace 100 mg daily to avoid constipation.   Any medications given at discharge you will need to have your PCP refill them.        Deep Vein Thrombosis        Report to ED or Call your local emergency number (911 in the [place]) if:   You feel lightheaded, short of breath, and have chest pain.     You cough up blood.     When should I call my doctor?   Your arm or leg feels  warm, tender, and painful. It may look swollen and red.    WHAT YOU NEED TO KNOW:   What is a deep vein thrombosis (DVT)? A DVT is a blood clot that forms in a deep vein of the body. The deep veins in the legs, thighs, and hips are the most common sites for DVT. A DVT can also occur in a deep vein within your arms. The clot prevents the normal flow of blood in the vein. The blood backs up and causes pain and swelling. The DVT can break into smaller pieces and travel to your lungs and cause a blockage called a pulmonary embolism (PE). A PE can become life-threatening.     What increases my risk for a DVT? After you have a DVT, your risk for another increases. A DVT can happen to anyone, but any of the following may increase your risk:  A family history of blood clots     Limited activity caused by bed rest, a leg cast, or sitting for long periods     Injury to a deep vein, or surgery     A blood disorder that makes your blood clot faster than normal, such as factor V Leiden mutation     Age older than 60 years     Hormone replacement therapy     Birth control pills, especially in women who smoke or are older than 35 years     Pregnancy, and for 6 weeks after childbirth     Cancer or heart failure     A catheter placed in a large vein     Smoking cigarettes     Obesity or varicose veins     What are the signs and symptoms of a DVT?   Swelling     Redness     Warmth, pain, or tenderness     How is a DVT diagnosed?   A D-dimer blood test may be done to check for signs of a blood clot.     An ultrasound uses sound waves to show pictures on a monitor. An ultrasound may be done to show a clot in your vein.     Contrast venography is an x-ray of a vein. Contrast liquid is used to make the vein easier to see on the x-ray. Tell a healthcare provider if you have ever had an allergic reaction to contrast liquid.            What can I do to prevent a DVT?  Walking and being mobile is very important after surgery to prevent a  blood clot from forming even if you are having surgical discomfort. You need to walk.  If you are on a car ride, you must stop, get out and walk around every hour,  flex your ankle frequently while riding in the car. If you must travel by airplane you will need to get up and walk the isle every hour, flex your ankles frequently while sitting. Compression socks 20-30 mm Hg are recommended as well.  You can buy these in a pharmacy.     Exercise regularly to help increase your blood flow. Walking is a good low-impact exercise. Talk to your healthcare provider about the best exercise plan for you.          Change your body position or move around often. Move and stretch in your seat several times each hour if you travel by car or work at a desk. In an airplane, get up and walk every hour. Move your legs by tightening and releasing your leg muscles while sitting. You can move your legs while sitting by raising and lowering your heels. Keep your toes on the floor while you do this. You can also raise and lower your toes while keeping your heels on the floor.                  Maintain a healthy weight. Ask your healthcare provider what a healthy weight is for you. Ask him or her to help you create a weight loss plan if you are overweight.     Do not smoke. Nicotine and other chemicals in cigarettes and cigars can damage blood vessels and make it more difficult to manage your DVT. Ask your healthcare provider for information if you currently smoke and need help to quit. E-cigarettes or smokeless tobacco still contain nicotine. Talk to your healthcare provider before you use these products.     Ask about birth control if you are a woman who takes the pill. A birth control pill increases the risk for PE in certain women. The risk is higher if you are also older than 35, smoke cigarettes, or have a blood clotting disorder. Talk to your healthcare provider about other ways to prevent pregnancy, such as a cervical cap or  intrauterine device (IUD).

## 2024-10-30 ENCOUNTER — HOME CARE VISIT (OUTPATIENT)
Dept: HOME HEALTH SERVICES | Facility: HOME HEALTHCARE | Age: 74
End: 2024-10-30
Payer: MEDICARE

## 2024-10-30 VITALS
SYSTOLIC BLOOD PRESSURE: 124 MMHG | TEMPERATURE: 98 F | OXYGEN SATURATION: 98 % | HEART RATE: 88 BPM | DIASTOLIC BLOOD PRESSURE: 66 MMHG | RESPIRATION RATE: 16 BRPM

## 2024-10-30 PROCEDURE — G0299 HHS/HOSPICE OF RN EA 15 MIN: HCPCS

## 2024-10-30 PROCEDURE — 10330081 VN NO-PAY CLAIM PROCEDURE

## 2024-10-30 PROCEDURE — 400013 VN SOC

## 2024-10-30 NOTE — CASE COMMUNICATION
Medication discrepancies or Major drug interactions  Abnormal clinical findings no bp right arm  This report is informational only, no response is needed  [facility] has Admitted your patient to Home Health service with the following disciplines: SN  Patient stated goals of care get drains removed  Potential barriers to goal achievement easily fatigued sob with exertion  Primary focus of home health care:Wound  Anticipated visit pat kerri and next visit date 11/1 2w3 1w2  Thank you for allowing us to participate in the care of your patient.      Naye Holman RN A

## 2024-10-31 ENCOUNTER — TELEPHONE (OUTPATIENT)
Dept: SURGICAL ONCOLOGY | Facility: CLINIC | Age: 74
End: 2024-10-31

## 2024-10-31 NOTE — TELEPHONE ENCOUNTER
How does the incision look? WNL    Do you have fever or chills? No    Are you having any pain? No    Do you have a drain(s)? Yes 4 drains    Are there any concerns with your drain? No,  nurse there yesterday and coming today.    Verify post-op appointment date and time  [x]    Do you have any other questions or concerns? Patient will be showering today with the assistance of family. Patient asking HH nurse to bring shower chair with her today in case she feels woozy. Patient feels well overall. Patient does report some constipation but she will take Colace. Advised patient to stay hydrated. Patient will call if constipation continues or if she has any further questions or concerns. Patient has good family support and  RN. Otherwise she will see Dr. Walden at post-op.    **NOTE TO TRIAGER: If patient requires further triage, based upon the answers above, move to appropriate triage protocol.

## 2024-11-01 ENCOUNTER — HOME CARE VISIT (OUTPATIENT)
Dept: HOME HEALTH SERVICES | Facility: HOME HEALTHCARE | Age: 74
End: 2024-11-01
Payer: MEDICARE

## 2024-11-01 LAB
DME PARACHUTE DELIVERY DATE ACTUAL: NORMAL
DME PARACHUTE DELIVERY DATE REQUESTED: NORMAL
DME PARACHUTE ITEM DESCRIPTION: NORMAL
DME PARACHUTE ORDER STATUS: NORMAL
DME PARACHUTE SUPPLIER NAME: NORMAL
DME PARACHUTE SUPPLIER PHONE: NORMAL

## 2024-11-01 PROCEDURE — G0299 HHS/HOSPICE OF RN EA 15 MIN: HCPCS

## 2024-11-03 VITALS
DIASTOLIC BLOOD PRESSURE: 60 MMHG | HEART RATE: 82 BPM | RESPIRATION RATE: 16 BRPM | TEMPERATURE: 98 F | SYSTOLIC BLOOD PRESSURE: 122 MMHG | OXYGEN SATURATION: 98 %

## 2024-11-04 ENCOUNTER — HOME CARE VISIT (OUTPATIENT)
Dept: HOME HEALTH SERVICES | Facility: HOME HEALTHCARE | Age: 74
End: 2024-11-04
Payer: MEDICARE

## 2024-11-04 PROCEDURE — G0156 HHCP-SVS OF AIDE,EA 15 MIN: HCPCS

## 2024-11-04 PROCEDURE — 88341 IMHCHEM/IMCYTCHM EA ADD ANTB: CPT | Performed by: STUDENT IN AN ORGANIZED HEALTH CARE EDUCATION/TRAINING PROGRAM

## 2024-11-04 PROCEDURE — 88342 IMHCHEM/IMCYTCHM 1ST ANTB: CPT | Performed by: STUDENT IN AN ORGANIZED HEALTH CARE EDUCATION/TRAINING PROGRAM

## 2024-11-04 PROCEDURE — G0180 MD CERTIFICATION HHA PATIENT: HCPCS | Performed by: SURGERY

## 2024-11-04 PROCEDURE — 88307 TISSUE EXAM BY PATHOLOGIST: CPT | Performed by: STUDENT IN AN ORGANIZED HEALTH CARE EDUCATION/TRAINING PROGRAM

## 2024-11-05 ENCOUNTER — HOME CARE VISIT (OUTPATIENT)
Dept: HOME HEALTH SERVICES | Facility: HOME HEALTHCARE | Age: 74
End: 2024-11-05
Payer: MEDICARE

## 2024-11-05 ENCOUNTER — TELEPHONE (OUTPATIENT)
Age: 74
End: 2024-11-05

## 2024-11-05 VITALS
SYSTOLIC BLOOD PRESSURE: 124 MMHG | RESPIRATION RATE: 16 BRPM | TEMPERATURE: 98 F | OXYGEN SATURATION: 99 % | HEART RATE: 82 BPM | DIASTOLIC BLOOD PRESSURE: 66 MMHG

## 2024-11-05 PROCEDURE — G0299 HHS/HOSPICE OF RN EA 15 MIN: HCPCS

## 2024-11-05 NOTE — TELEPHONE ENCOUNTER
FYI:  Call from GADIEL/Naye reports that last 3 days each of the 4 AKOSUA drains is putting out less than 10cc/day each drain.Otherwise pt healing well no other issues noted.    Pt has sched f/u 11/20.Can reach out to pt if needing sooner appt.  Pls advise

## 2024-11-06 ENCOUNTER — HOME CARE VISIT (OUTPATIENT)
Dept: HOME HEALTH SERVICES | Facility: HOME HEALTHCARE | Age: 74
End: 2024-11-06
Payer: MEDICARE

## 2024-11-06 PROCEDURE — G0152 HHCP-SERV OF OT,EA 15 MIN: HCPCS

## 2024-11-06 NOTE — TELEPHONE ENCOUNTER
Called patient and scheduled her for a follow up with Dr. Walden to check her drain on 11/7 at 1:45 pm

## 2024-11-07 ENCOUNTER — HOME CARE VISIT (OUTPATIENT)
Dept: HOME HEALTH SERVICES | Facility: HOME HEALTHCARE | Age: 74
End: 2024-11-07
Payer: MEDICARE

## 2024-11-07 ENCOUNTER — OFFICE VISIT (OUTPATIENT)
Dept: SURGICAL ONCOLOGY | Facility: CLINIC | Age: 74
End: 2024-11-07

## 2024-11-07 VITALS
SYSTOLIC BLOOD PRESSURE: 122 MMHG | BODY MASS INDEX: 33.32 KG/M2 | OXYGEN SATURATION: 95 % | HEART RATE: 80 BPM | WEIGHT: 200 LBS | TEMPERATURE: 98.1 F | DIASTOLIC BLOOD PRESSURE: 80 MMHG | HEIGHT: 65 IN

## 2024-11-07 VITALS — OXYGEN SATURATION: 94 % | DIASTOLIC BLOOD PRESSURE: 62 MMHG | SYSTOLIC BLOOD PRESSURE: 124 MMHG | HEART RATE: 75 BPM

## 2024-11-07 DIAGNOSIS — Z17.0 MALIGNANT NEOPLASM OF UPPER-INNER QUADRANT OF RIGHT BREAST IN FEMALE, ESTROGEN RECEPTOR POSITIVE (HCC): Primary | ICD-10-CM

## 2024-11-07 DIAGNOSIS — Z90.13 STATUS POST MASTECTOMY, BILATERAL: ICD-10-CM

## 2024-11-07 DIAGNOSIS — C50.211 MALIGNANT NEOPLASM OF UPPER-INNER QUADRANT OF RIGHT BREAST IN FEMALE, ESTROGEN RECEPTOR POSITIVE (HCC): Primary | ICD-10-CM

## 2024-11-07 DIAGNOSIS — Z48.03 ENCOUNTER FOR CHANGE OR REMOVAL OF DRAINS: ICD-10-CM

## 2024-11-07 PROCEDURE — G0156 HHCP-SVS OF AIDE,EA 15 MIN: HCPCS

## 2024-11-07 PROCEDURE — 99024 POSTOP FOLLOW-UP VISIT: CPT | Performed by: SURGERY

## 2024-11-07 NOTE — PROGRESS NOTES
"Assessment/Plan  Diagnoses and all orders for this visit:    Malignant neoplasm of upper-inner quadrant of right breast in female, estrogen receptor positive (HCC)    Status post mastectomy, bilateral    Encounter for change or removal of drains    She was brought in for drain checks.  Incisions are healing well.  Drains are draining serosanguineous 1 drain is not draining in the right side however after milking it started draining.  I reassured her to keep the drain until her next visit.  They understand and agreed to do so      No problem-specific Assessment & Plan notes found for this encounter.        Subjective:  @Patient ID: Samaria Barger is a 74 y.o. female.    Objective:    /80 (BP Location: Left arm, Patient Position: Sitting)   Pulse 80   Temp 98.1 °F (36.7 °C) (Temporal)   Ht 5' 5\" (1.651 m)   Wt 90.7 kg (200 lb)   SpO2 95%   BMI 33.28 kg/m²       "

## 2024-11-08 ENCOUNTER — HOME CARE VISIT (OUTPATIENT)
Dept: HOME HEALTH SERVICES | Facility: HOME HEALTHCARE | Age: 74
End: 2024-11-08
Payer: MEDICARE

## 2024-11-08 VITALS
HEART RATE: 77 BPM | TEMPERATURE: 98 F | RESPIRATION RATE: 16 BRPM | DIASTOLIC BLOOD PRESSURE: 66 MMHG | SYSTOLIC BLOOD PRESSURE: 124 MMHG | OXYGEN SATURATION: 98 %

## 2024-11-08 PROCEDURE — G0299 HHS/HOSPICE OF RN EA 15 MIN: HCPCS

## 2024-11-12 ENCOUNTER — HOME CARE VISIT (OUTPATIENT)
Dept: HOME HEALTH SERVICES | Facility: HOME HEALTHCARE | Age: 74
End: 2024-11-12
Payer: MEDICARE

## 2024-11-12 VITALS
TEMPERATURE: 98.3 F | DIASTOLIC BLOOD PRESSURE: 66 MMHG | OXYGEN SATURATION: 99 % | HEART RATE: 82 BPM | SYSTOLIC BLOOD PRESSURE: 124 MMHG | RESPIRATION RATE: 16 BRPM

## 2024-11-12 PROCEDURE — G0152 HHCP-SERV OF OT,EA 15 MIN: HCPCS

## 2024-11-12 PROCEDURE — G0299 HHS/HOSPICE OF RN EA 15 MIN: HCPCS

## 2024-11-13 ENCOUNTER — HOME CARE VISIT (OUTPATIENT)
Dept: HOME HEALTH SERVICES | Facility: HOME HEALTHCARE | Age: 74
End: 2024-11-13
Payer: MEDICARE

## 2024-11-13 VITALS — DIASTOLIC BLOOD PRESSURE: 66 MMHG | SYSTOLIC BLOOD PRESSURE: 124 MMHG | OXYGEN SATURATION: 99 % | HEART RATE: 82 BPM

## 2024-11-13 PROCEDURE — G0156 HHCP-SVS OF AIDE,EA 15 MIN: HCPCS

## 2024-11-15 ENCOUNTER — HOME CARE VISIT (OUTPATIENT)
Dept: HOME HEALTH SERVICES | Facility: HOME HEALTHCARE | Age: 74
End: 2024-11-15
Payer: MEDICARE

## 2024-11-15 ENCOUNTER — TELEPHONE (OUTPATIENT)
Age: 74
End: 2024-11-15

## 2024-11-15 VITALS
RESPIRATION RATE: 16 BRPM | DIASTOLIC BLOOD PRESSURE: 60 MMHG | SYSTOLIC BLOOD PRESSURE: 120 MMHG | TEMPERATURE: 98.5 F | HEART RATE: 68 BPM | OXYGEN SATURATION: 98 %

## 2024-11-15 PROCEDURE — G0156 HHCP-SVS OF AIDE,EA 15 MIN: HCPCS

## 2024-11-15 PROCEDURE — G0300 HHS/HOSPICE OF LPN EA 15 MIN: HCPCS

## 2024-11-15 NOTE — TELEPHONE ENCOUNTER
Pt reports recent double mastectomy by Dr. Walden on 10/28 with four drains placed.  Patient reports initially after surgery drains were producing about 10cc 3 times daily.  Today pt reports home health came to see her today and her incison sites look good, drain sites look good with no signs of infection or complications. She reports less drainage from all four drains, now currently draining about 5cc 3 times daily.  She wanted to know if this is ok.  I informed her that gradual reduction of drainage is to be expected as long as there are no signs of complications and drains continue to drain.  She confirmed her and home health reports everything looks good.  I confirmed with her her follow up appointment with Dr. Walden for 11/21/24 at [place] location at 10am.  She confirmed understanding and thanked me.

## 2024-11-18 ENCOUNTER — HOME CARE VISIT (OUTPATIENT)
Dept: HOME HEALTH SERVICES | Facility: HOME HEALTHCARE | Age: 74
End: 2024-11-18
Payer: MEDICARE

## 2024-11-18 PROCEDURE — G0152 HHCP-SERV OF OT,EA 15 MIN: HCPCS

## 2024-11-20 ENCOUNTER — HOME CARE VISIT (OUTPATIENT)
Dept: HOME HEALTH SERVICES | Facility: HOME HEALTHCARE | Age: 74
End: 2024-11-20
Payer: MEDICARE

## 2024-11-20 PROCEDURE — G0156 HHCP-SVS OF AIDE,EA 15 MIN: HCPCS

## 2024-11-21 ENCOUNTER — OFFICE VISIT (OUTPATIENT)
Dept: SURGICAL ONCOLOGY | Facility: CLINIC | Age: 74
End: 2024-11-21

## 2024-11-21 VITALS
WEIGHT: 200 LBS | BODY MASS INDEX: 33.32 KG/M2 | HEIGHT: 65 IN | HEART RATE: 72 BPM | SYSTOLIC BLOOD PRESSURE: 104 MMHG | OXYGEN SATURATION: 95 % | TEMPERATURE: 98.4 F | DIASTOLIC BLOOD PRESSURE: 70 MMHG

## 2024-11-21 DIAGNOSIS — Z90.13 STATUS POST MASTECTOMY, BILATERAL: ICD-10-CM

## 2024-11-21 DIAGNOSIS — Z48.03 CHANGE OR REMOVAL OF DRAINS: ICD-10-CM

## 2024-11-21 DIAGNOSIS — Z17.0 MALIGNANT NEOPLASM OF UPPER-INNER QUADRANT OF RIGHT BREAST IN FEMALE, ESTROGEN RECEPTOR POSITIVE (HCC): Primary | ICD-10-CM

## 2024-11-21 DIAGNOSIS — C50.211 MALIGNANT NEOPLASM OF UPPER-INNER QUADRANT OF RIGHT BREAST IN FEMALE, ESTROGEN RECEPTOR POSITIVE (HCC): Primary | ICD-10-CM

## 2024-11-21 PROCEDURE — 99024 POSTOP FOLLOW-UP VISIT: CPT | Performed by: SURGERY

## 2024-11-21 NOTE — PROGRESS NOTES
"Assessment/Plan  Diagnoses and all orders for this visit:    Malignant neoplasm of upper-inner quadrant of right breast in female, estrogen receptor positive (HCC)    Status post mastectomy, bilateral    Change or removal of drains       A. [place] lymph node, right axilla, #1 hot and blue, excision:   - One lymph node, negative for metastatic carcinoma (0/1), examined on H&E and confirmed by immunohistochemical staining for cytokeratin (AE1/AE3).      B. Breast, right, mastectomy:   - Multifocal invasive lobular carcinoma with focal ductal features.      - [place] Grade: 2 (3+2+1)      - Number of foci: 4      - Size of individual foci: 11 mm, 8 mm, 3 mm, 3 mm   - All margins are negative for invasive carcinoma (with a clearance of >2 mm).   - Lobular carcinoma in situ (LCIS), classic type, scattered foci.   - The non-neoplastic breast tissue shows moderate usual ductal hyperplasia, apocrine metaplasia, and columnar cell change.   - Nipple skin with Demodex infestation; skin is otherwise unremarkable.   - Biopsy site changes are present (x 2).     C. Lymph node, right axilla, palpable, excision:   - Benign fibroadipose tissue; no lymph node identified.     D. Breast, left, mastectomy:   - Benign breast tissue with moderate usual ductal hyperplasia, intraductal papilloma (2 mm), columnar cell change, and apocrine metaplasia.   - One benign lymph node.          Drains were removed.  Incisions healing well no evidence of surgical site infection.  Pathology was discussed.  Refer her to medical oncologist as well as PT OT for lymphedema prevention.  I will see her in 3 months      No problem-specific Assessment & Plan notes found for this encounter.        Subjective:  @Patient ID: Samaria Barger is a 74 y.o. female.    Objective:    /70 (BP Location: Left arm, Patient Position: Sitting)   Pulse 72   Temp 98.4 °F (36.9 °C) (Temporal)   Ht 5' 5\" (1.651 m)   Wt 90.7 kg (200 lb)   SpO2 95%   BMI 33.28 kg/m² "

## 2024-11-22 ENCOUNTER — HOME CARE VISIT (OUTPATIENT)
Dept: HOME HEALTH SERVICES | Facility: HOME HEALTHCARE | Age: 74
End: 2024-11-22
Payer: MEDICARE

## 2024-11-22 ENCOUNTER — DOCUMENTATION (OUTPATIENT)
Dept: HEMATOLOGY ONCOLOGY | Facility: CLINIC | Age: 74
End: 2024-11-22

## 2024-11-22 ENCOUNTER — TELEPHONE (OUTPATIENT)
Age: 74
End: 2024-11-22

## 2024-11-22 VITALS
TEMPERATURE: 98.5 F | OXYGEN SATURATION: 95 % | HEART RATE: 80 BPM | SYSTOLIC BLOOD PRESSURE: 110 MMHG | DIASTOLIC BLOOD PRESSURE: 60 MMHG | RESPIRATION RATE: 16 BRPM

## 2024-11-22 PROCEDURE — G0300 HHS/HOSPICE OF LPN EA 15 MIN: HCPCS

## 2024-11-22 PROCEDURE — G0156 HHCP-SVS OF AIDE,EA 15 MIN: HCPCS

## 2024-11-22 NOTE — TELEPHONE ENCOUNTER
I called Samaria in response to a referral that was received for patient to establish care with Medical Oncology    Outreach was made to schedule a consultation.    A consultation was scheduled for patient during this call. Patient is scheduled on 1/6/25 at 9:00am with Dr Corbin at the [facility]     Per clinical review, patient should be seen within 7 days.  Patient requested to see Dr Corbin at the [facility].  Patient declined sooner appointments due to her son traveling.  Patient states her son will be coming to the appointment with her.  A message will be sent to the Medical Oncology Leadership pool in regards to patient's appointment being scheduled outside of guidelines.

## 2024-11-22 NOTE — PROGRESS NOTES
Referral to medical oncology received from Dr Walden.  Patient was seen today for a post-op visit.  Chart reviewed by oncology nurse navigator at this time.      Diagnosis: breast cancer, post surgery  After review of chart, instructions for scheduling added to referral and sent to be scheduled as advised.

## 2024-11-25 ENCOUNTER — HOME CARE VISIT (OUTPATIENT)
Dept: HOME HEALTH SERVICES | Facility: HOME HEALTHCARE | Age: 74
End: 2024-11-25
Payer: MEDICARE

## 2024-11-25 ENCOUNTER — PATIENT OUTREACH (OUTPATIENT)
Dept: HEMATOLOGY ONCOLOGY | Facility: CLINIC | Age: 74
End: 2024-11-25

## 2024-11-25 ENCOUNTER — TELEPHONE (OUTPATIENT)
Dept: SURGICAL ONCOLOGY | Facility: CLINIC | Age: 74
End: 2024-11-25

## 2024-11-25 VITALS
TEMPERATURE: 98 F | RESPIRATION RATE: 16 BRPM | SYSTOLIC BLOOD PRESSURE: 124 MMHG | DIASTOLIC BLOOD PRESSURE: 60 MMHG | OXYGEN SATURATION: 98 % | HEART RATE: 89 BPM

## 2024-11-25 PROCEDURE — G0156 HHCP-SVS OF AIDE,EA 15 MIN: HCPCS

## 2024-11-25 PROCEDURE — G0299 HHS/HOSPICE OF RN EA 15 MIN: HCPCS

## 2024-11-25 NOTE — TELEPHONE ENCOUNTER
Called patient and spoke about [facility] as she asked at her last appointment and gave her phone number to contact. All questions answered at this time. Patient appreciative.

## 2024-11-25 NOTE — PROGRESS NOTES
I reached out and spoke to Samaria to follow up with them and to review for any changes in barriers to care and offer supportive services as needed.    Barriers noted previously: N/A    Current barriers and interventions provided: N/A      This patient will no longer require follow up.  I have provided care team contact information to the patient and welcome them to contact me if their needs as discussed above change.  Patient will be seeing Medical Oncology on 12/10/24 and might be starting Aromatase Inhibitor at that time then she would like to schedule an appointment for Survivorship.   Pt is aware who to contact if her needs change. Patient was appreciative of the phone call.

## 2024-11-26 ENCOUNTER — HOME CARE VISIT (OUTPATIENT)
Dept: HOME HEALTH SERVICES | Facility: HOME HEALTHCARE | Age: 74
End: 2024-11-26
Payer: MEDICARE

## 2024-11-26 PROCEDURE — G0152 HHCP-SERV OF OT,EA 15 MIN: HCPCS

## 2024-12-02 ENCOUNTER — TELEPHONE (OUTPATIENT)
Dept: OBGYN CLINIC | Facility: OTHER | Age: 74
End: 2024-12-02

## 2024-12-09 ENCOUNTER — TELEPHONE (OUTPATIENT)
Dept: HEMATOLOGY ONCOLOGY | Facility: CLINIC | Age: 74
End: 2024-12-09

## 2024-12-09 NOTE — TELEPHONE ENCOUNTER
I contacted patient to schedule a Survivorship consult .We discussed the program and some of the benefits it has to offer. Patient stated she was interested in scheduling a consult. I offered her the below appointment and she was agreeable.  Patient stated she has been having a hard time with body image after her bilateral mastectomy. She is reaching out to Cancer Support [facility] and stated she tries little by little to look and feels she is starting to get better with the change.  Patient stated she has a appointment on Wednesday for the Akil Adame to be fitted and optimistic about her visit. She had no questions or concerns at this time. I provided my contact information and encouraged her to call should any arise.     Type of appointment-Survivorship consultation   Provider-Tristian George  [id number]  Time-2:00pm  Location-[place]

## 2024-12-10 ENCOUNTER — CONSULT (OUTPATIENT)
Dept: HEMATOLOGY ONCOLOGY | Facility: CLINIC | Age: 74
End: 2024-12-10
Payer: MEDICARE

## 2024-12-10 VITALS
HEIGHT: 65 IN | OXYGEN SATURATION: 95 % | WEIGHT: 208 LBS | BODY MASS INDEX: 34.66 KG/M2 | RESPIRATION RATE: 18 BRPM | SYSTOLIC BLOOD PRESSURE: 124 MMHG | HEART RATE: 70 BPM | DIASTOLIC BLOOD PRESSURE: 74 MMHG | TEMPERATURE: 97.9 F

## 2024-12-10 DIAGNOSIS — C50.211 MALIGNANT NEOPLASM OF UPPER-INNER QUADRANT OF RIGHT BREAST IN FEMALE, ESTROGEN RECEPTOR POSITIVE (HCC): ICD-10-CM

## 2024-12-10 DIAGNOSIS — Z17.0 MALIGNANT NEOPLASM OF UPPER-INNER QUADRANT OF RIGHT BREAST IN FEMALE, ESTROGEN RECEPTOR POSITIVE (HCC): ICD-10-CM

## 2024-12-10 PROCEDURE — 99205 OFFICE O/P NEW HI 60 MIN: CPT | Performed by: INTERNAL MEDICINE

## 2024-12-10 NOTE — PROGRESS NOTES
Hematology/Oncology Outpatient Office Note  Date of Service: 12/10/2024    [facility] [address]  [facility] [place] [address]  [phone]    Reason for Consultation:   Chief Complaint   Patient presents with    Consult     Referral Physician: Sha Walden*  Primary Care Physician:  Lea Reyes, MD     Oncology history:     Oncology History   Malignant neoplasm of upper-inner quadrant of right breast in female, estrogen receptor positive (HCC)   8/29/2024 Biopsy    Right breast stereotactic biopsy  1 o'clock  Invasive lobular carcinoma  Histologic grade 1   ER 90  AR 60  HER2 0    The pathology results indicate a Malignant finding with invasive lobular carcinoma and lobular carcinoma in situ (lcis). Radiology and pathology are concordant.  Right malignancy appears unifocal. Suspicious architectural distortion cover an area of 1 cm. Clip was displaced medially by 0.7 cm and inferiorly by 1 cm.   Ultrasound of the right axilla was not previously performed. Recent imaging of the contralateral left breast dated July 19, 2024 was reviewed and shows no suspicious findings.  Surgical Consultation for the right breast.  Patient should also potentially undergo right axillary ultrasound as not previously performed.  Given the lobular nature of the carcinoma, breast MRI should be considered      9/17/2024 Genetic Testing    [place]  A total of 59 genes were evaluated, including: APC, TEREZA, BARD 1, BMPR1A, BRCA1, BRCA2, BRIP1, CDH1, CDK4, CDKN1B, CKDN2A, CHEK2, DICER1, FH, FLCN, KIF1B, MAX, MEN1, MET, MLH1, MSH2, MSH6, MUTYH, NBN, NF1, NTHL1, PALB2, PMS2, POT1, PTEN, RAD51C, RAD51D, RB1, RET, SDHA, SDHAF2, SDHB, SDHC, SDHD, SMAD4, SMARCA4, STK11, VKAR495, TP53, TSC1, TSC2, VHL (sequencing and depletion/duplication); AXIN2, CTNNA1, EGLN1, HOXB13, KIT, MITF, MSH3, PDGFRA, POLD1, AND POLE (sequencing only); EPCAM, and GREM1 (depletion/duplication  only)  Negative result. No pathogenic sequence variants or deletions/duplications identified     9/27/2024 Biopsy    Right breast MRI guided biopsy   12 o'clock 7cm from nipple  Invasive mammary carcinoma with ductal and lobular features  [place] grade 1 of 3  Lymph-vascular invasion not identified  ER >95  MO 1-5%  HER2 1+    Radiology and pathology are concordant. Right malignancy appears multifocal. Suspicious masses cover an area of 3.0 cm (AP) by 0.8 cm (transverse) measured on MRI guided breast biopsy performed 9/27/2024.  This does include the second focus not sampled.  Ultrasound of the right axilla was not performed but there is no suspicious adenopathy on breast MRI performed 9/10/2024.  Recent imaging of the contralateral left breast including screening mammogram dated 7/19/2024 and breast MRI performed 9/10/2024 was reviewed and shows no suspicious findings.     9/27/2024 -  Cancer Staged    Staging form: Breast, AJCC 8th Edition  - Clinical stage from 9/27/2024: Stage IB (cT2, cN0, cM0, G1, ER+, MO+, HER2-) - Signed by Sha Walden MD on 10/7/2024  Stage prefix: Initial diagnosis  Histologic grading system: 3 grade system       10/28/2024 Surgery    Bilateral breast mastectomy right sentinel lymph node biopsy  RIGHT   Multifocal invasive lobular carcinoma with focal ductal features   Grade 2  11 mm, 8 mm, 3 mm, 3 mm  Margins negative   0/1 Lymph node    LEFT   Benign breast tissue with moderate usual ductal hyperplasia, intraductal papilloma (2 mm), columnar cell change, and apocrine metaplasia.  One benign lymph node         Primary Diagnosis:  1.  Stage IA (pT1c(m) pN0(sn) cM0) right breast invasive lobular carcinoma.  Grade 2.  Greatest dimension of largest invasive focus 11 mm.  ER/MO positive, HER2 negative.  Diagnosed October 2024.      Previous Hematologic/ Oncologic Treatment:   S/p right breast mastectomy and sentinel lymph node biopsy.  Prophylactic left breast mastectomy with  lymph node biopsy.     Current Hematologic/ Oncologic Treatment:    Plan     History of present illness:   Samaria Barger is a 74 y.o. female noted to have abnormal routine screening mammogram which subsequently led to further diagnostic workup. CT scan of the chest, abdomen, pelvis noted nonspecific right middle lobe micronodule and patient elected to repeat CT chest in 3 months.  She was also noted to have cyst in the liver and is to repeat scans in 3 months.  She underwent right breast mastectomy and left breast mastectomy on 10/28/2024.  Final pathology was consistent with a multifocal invasive lobular carcinoma with focal ductal features on the right, size of individual foci 11 mm, 8 mm, 3 mm, 3 mm, negative margins, lymph node negative for metastatic carcinoma.  Left breast mastectomy noted benign breast tissue with moderate usual ductal hyperplasia, intraductal papilloma, columnar cell change and apocrine metaplasia, 1 lymph node benign.    Menarche: 15 years old  Menopause: 54 years  Age at first pregnancy: 29 years  OCPs/HRT: denies  Denies any family history of breast cancer.    She presented to medical neurology consultation.  Denies any chest pain, palpitations, respiratory symptoms or bone pain.    Review of systems:   Review of Systems   Constitutional: Negative. Negative for fever and malaise/fatigue.   HENT: Negative.     Cardiovascular: Negative.  Negative for chest pain, dyspnea on exertion and palpitations.   Respiratory: Negative.  Negative for shortness of breath.    Hematologic/Lymphatic: Negative.  Does not bruise/bleed easily.   Skin: Negative.    Musculoskeletal: Negative.    Gastrointestinal: Negative.  Negative for abdominal pain.   Neurological: Negative.    Psychiatric/Behavioral: Negative.          A 10-point review of system was performed, pertinent positive and negative were detailed as above. Otherwise, the 10-point review of system was negative.    Past Medical History:   Diagnosis  Date    Anxiety     Bradycardia 04/03/2020    Lightheaded 01/17/2022    Vertigo        Past Surgical History:   Procedure Laterality Date    LYMPH NODE BIOPSY Right 10/28/2024    Procedure: LYMPHATIC MAPPING WITH BLUE AND RADIOACTIVE DYES, SENTINEL LYMPH NODE BIOPSY, INJECTION IN OR AT 1330 BY DR WALDEN;  Surgeon: Sha Walden MD;  Location: [facility];  Service: Surgical Oncology    MAMMO NEEDLE LOCALIZATION LEFT (ALL INC) Right 10/10/2024    MAMMO NEEDLE LOCALIZATION LEFT (ALL INC) EACH ADD Right 10/10/2024    MAMMO STEREOTACTIC BREAST BIOPSY RIGHT (ALL INC) Right 08/29/2024    MAMMO STEREOTACTIC BREAST BIOPSY RIGHT (ALL INC) Right 8/29/2024    MRI BREAST BIOPSY RIGHT (ALL INCLUSIVE) Right 9/27/2024    SIMPLE MASTECTOMY Bilateral 10/28/2024    Procedure: RIGHT BREAST MASTECTOMY, LEFT TOTAL MASTECTOMY, DARRON CLIPS IN THE BREASTS;  Surgeon: Sha Walden MD;  Location: [facility];  Service: Surgical Oncology       Family History   Problem Relation Age of Onset    Dementia Mother     Heart attack Father     No Known Problems Brother     No Known Problems Brother     No Known Problems Son     Hyperlipidemia Son     No Known Problems Son     No Known Problems Daughter     No Known Problems Maternal Grandmother     No Known Problems Maternal Grandfather     No Known Problems Paternal Grandmother     No Known Problems Paternal Grandfather     Brain cancer Maternal Aunt     Coronary artery disease Maternal Uncle        Social History     Socioeconomic History    Marital status:      Spouse name: Not on file    Number of children: Not on file    Years of education: Not on file    Highest education level: Not on file   Occupational History    Not on file   Tobacco Use    Smoking status: Never    Smokeless tobacco: Never   Vaping Use    Vaping status: Never Used   Substance and Sexual Activity    Alcohol use: Not Currently     Comment: 1 per month    Drug use: Never    Sexual activity: Not  Currently     Birth control/protection: Post-menopausal   Other Topics Concern    Not on file   Social History Narrative    Not on file     Social Drivers of Health     Financial Resource Strain: Low Risk  (2023)    Overall Financial Resource Strain (CARDIA)     Difficulty of Paying Living Expenses: Not hard at all   Food Insecurity: No Food Insecurity (10/28/2024)    Nursing - Inadequate Food Risk Classification     Worried About Running Out of Food in the Last Year: Never true     Ran Out of Food in the Last Year: Never true     Ran Out of Food in the Last Year: 1   Transportation Needs: No Transportation Needs (2024)    OASIS : Transportation     Lack of Transportation (Medical): No     Lack of Transportation (Non-Medical): No     Patient Unable or Declines to Respond: No   Physical Activity: Not on file   Stress: Not on file   Social Connections: Not on file   Intimate Partner Violence: Unknown (10/28/2024)    Nursing IPS     Feels Physically and Emotionally Safe: Not on file     Physically Hurt by Someone: Not on file     Humiliated or Emotionally Abused by Someone: Not on file     Physically Hurt by Someone: 2     Hurt or Threatened by Someone: 2   Housing Stability: Unknown (10/28/2024)    Nursing: Inadequate Housing Risk Classification     Has Housing: Not on file     Worried About Losing Housing: Not on file     Unable to Get Utilities: Not on file     Unable to Pay for Housing in the Last Year: 2     Has Housin       Allergies   Allergen Reactions    Atorvastatin Arthralgia       Current Outpatient Medications   Medication Sig Dispense Refill    Calcium Ascorbate (VITAMIN C) 500 mg tablet Take 500 mg by mouth daily      Calcium Carb-Cholecalciferol (CALCIUM 1000 + D PO) Take 1 tablet by mouth daily      LORazepam (Ativan) 0.5 mg tablet Take one tablet 30 min before the MRI, then one tablet immediately before the MRI. Do not drive. 2 tablet 0    Lysine 500 MG CAPS Take 1 capsule by mouth  daily      Multiple Vitamin (MULTIVITAMIN ADULT PO) Take 1 tablet by mouth daily      rosuvastatin (CRESTOR) 10 MG tablet Take 1 tablet (10 mg total) by mouth daily 100 tablet 1    Vitamin D, Cholecalciferol, 50 MCG (2000 UT) CAPS Take 1 tablet by mouth daily      vitamin E, tocopherol, 400 units capsule Take 400 Units by mouth daily      naloxone (NARCAN) 4 mg/0.1 mL nasal spray Administer 1 spray into a nostril. If no response after 2-3 minutes, give another dose in the other nostril using a new spray. (Patient not taking: Reported on 12/10/2024) 1 each 1     No current facility-administered medications for this visit.     I have reviewed the relevant past medical, surgical, social and family history. I have also reviewed allergies and medications for this patient.    Objective:      Vitals:    12/10/24 1338   BP: 124/74   Pulse: 70   Resp: 18   Temp: 97.9 °F (36.6 °C)   SpO2: 95%       Wt Readings from Last 3 Encounters:   12/10/24 94.3 kg (208 lb)   11/21/24 90.7 kg (200 lb)   11/07/24 90.7 kg (200 lb)       Performance status: ECOG PS: 0  Physical Exam  Vitals and nursing note reviewed.   Constitutional:       General: She is not in acute distress.     Appearance: She is well-developed.   HENT:      Head: Normocephalic and atraumatic.   Eyes:      Conjunctiva/sclera: Conjunctivae normal.   Cardiovascular:      Rate and Rhythm: Normal rate.   Pulmonary:      Effort: Pulmonary effort is normal. No respiratory distress.      Breath sounds: Normal breath sounds.   Chest:          Comments: S/p b/l mastectomy  Abdominal:      Palpations: Abdomen is soft.      Tenderness: There is no abdominal tenderness.   Musculoskeletal:         General: No swelling.      Cervical back: Neck supple.   Skin:     General: Skin is warm and dry.   Neurological:      General: No focal deficit present.      Mental Status: She is alert and oriented to person, place, and time.      Gait: Gait normal.   Psychiatric:         Mood and  Affect: Mood normal.       Data Review:  Pathology Result:  Final Diagnosis   Date Value Ref Range Status   10/28/2024   Final    A. [place] lymph node, right axilla, #1 hot and blue, excision:   - One lymph node, negative for metastatic carcinoma (0/1), examined on H&E and confirmed by immunohistochemical staining for cytokeratin (AE1/AE3).     B. Breast, right, mastectomy:   - Multifocal invasive lobular carcinoma with focal ductal features.      - [place] Grade: 2 (3+2+1)      - Number of foci: 4      - Size of individual foci: 11 mm, 8 mm, 3 mm, 3 mm   - All margins are negative for invasive carcinoma (with a clearance of >2 mm).   - Lobular carcinoma in situ (LCIS), classic type, scattered foci.   - The non-neoplastic breast tissue shows moderate usual ductal hyperplasia, apocrine metaplasia, and columnar cell change.   - Nipple skin with Demodex infestation; skin is otherwise unremarkable.   - Biopsy site changes are present (x 2).    C. Lymph node, right axilla, palpable, excision:   - Benign fibroadipose tissue; no lymph node identified.    D. Breast, left, mastectomy:   - Benign breast tissue with moderate usual ductal hyperplasia, intraductal papilloma (2 mm), columnar cell change, and apocrine metaplasia.   - One benign lymph node.       09/27/2024   Final    A. Breast, Right, :  - Invasive mammary carcinoma with ductal and lobular features.   * [place] grade 1 of 3 (total score: 5 of 9)    -- tubule formation < 10%, score 3    -- nuclear grade 1 of 3, score 1    -- mitoses < 3/mm2, (</= 7 mitoses/10HPF), score 1.   * Confirmed by tumor cell immunophenotype:    -- positive: membranous and cytoplasmic stain for p120.    -- negative:  p63, calponin-B, SMMHC, E-Cadherin.     * Invasive carcinoma involves 3 of 6 submitted core biopsies, max. dimension = 8 millimeters.   * Estrogen, progesterone & HER2 receptor studies pending, to be described in a separate receptor report.    * Ductal carcinoma in situ  (DCIS): Not identified;   *Multifocal in situ lobular neoplasia (atypical lobular hyperplasia approaching lobular carcinoma in situ)   * Lymph-vascular invasion: Not identified.    * Microcalcifications: Absent.     Note: Regional breast nurse navigator team is notified of the diagnosis via [employer] Secure Chat on 10/01/2024  at 11.40 am.     08/29/2024   Final    A. Breast, Right, stereo specimen 1- 1:00- 6 cores all submitted:  Invasive lobular carcinoma  Favor histologic grade 1 (tubules: 3, nuclei: 2; mitosis: 1; score 6)     Involves 2 out of 6 core tissue fragments   Greatest dimension 5 mm    Lobular carcinoma in situ, focal    See note         Image Results:  Image result are reviewed and documented in Hematology/Oncology history    11/13/2023: DEXA scan: Low bone mass (osteopenia).  Based on the left femoral neck.    7/19/2024: Bilateral screening mammogram:  RIGHT  1) ARCHITECTURAL DISTORTION [A]: There is questioned architectural distortion involving an asymmetry in the inner posterior right breast.  The asymmetry appears similar to prior mammograms; however, there is questioned distortion which appears slightly more prominent on this examination.   Left  There are no new suspicious masses, grouped microcalcifications or areas of unexplained architectural distortion. The skin and nipple areolar complex are unremarkable.     IMPRESSION:  Additional imaging required.  A breast health care nurse from our facility will be contacting the patient regarding the need for additional imaging.    8/21/2024: Diagnostic right mammogram:  RIGHT  1) ARCHITECTURAL DISTORTION [A]  Mammo diagnostic right w 3d & cad: There is architectural distortion seen in the upper inner quadrant of the inner region of the right breast in the posterior depth.   Right breast ultrasound: Targeted breast ultrasounds performed using dedicated high-resolution probe.  No sonographic correlate is identified for the area of architectural distortion  in the upper inner right breast.     IMPRESSION:  Architectural distortion in the upper inner right breast.  Right tomographic guided core needle biopsy is recommended.        9/6/2024: CT chest/abdomen/pelvis:  1) 4.0 x 2.7 x 1.2 cm collection in the posterior upper right breast just superficial to the right pectoralis major muscle, with a biopsy clip centrally, likely related to the prior biopsy.   2) No convincing evidence of metastatic disease in the chest, abdomen and pelvis.   3) Nonspecific right middle lobe micronodule. Although felt to be unlikely to be a metastasis, given lack of prior studies to establish chronicity, recommend repeat chest CT in 3 months.   4) Several simple cysts in the liver, as well as several subcentimeter hypodense lesions too small to accurate characterize with CT technique. Statistically these likely represent additional cysts, however consider confirmation with abdominal MRI if there are no contraindications versus 3-month follow-up contrast-enhanced CT abdomen.   5) Additional findings as above.    9/9/2024: Bone scan:  1. No scintigraphic evidence of osseous metastasis.     9/10/2024: MRI breast bilateral:  RIGHT  In the upper central right breast at a posterior depth, susceptibility artifact is seen within the anteroinferior aspect of a a small to moderate size hematoma measuring 31 x 12 mm consistent with area of recent biopsy-proven carcinoma.  Of note, at the time of prior biopsy, the marker was noted to be displaced inferiorly by 1 cm.  No discrete suspicious enhancement is seen directly superior to the area of the hematoma however extending up to 1.7 cm anteriorly from the susceptibility artifact and anterior aspect of the hematoma at the 12:00 axis approximately 7 cm from the nipple are 2 similar-appearing foci measuring 4 x 3 x 3 mm more anteriorly (axial 120; sagittal 106) and 4 x 4 x 4 mm (axial 118; sagittal 103), respectively.  A vessel is seen coursing alongside the  medial aspect of the more anterior focus.  These appear hypointense on T2 weighted images.  Kinetic evaluation demonstrates delayed plateau enhancement.   Left  There are no suspicious enhancing masses or suspicious areas of non-mass enhancement.  A subcentimeter benign intramammary lymph node is present at the middle depth at the 3:00 axis.     There is no axillary or internal mammary adenopathy bilaterally.      IMPRESSION:  Biopsy-proven carcinoma in the upper central right breast with embedded susceptibility artifact and expected postbiopsy change.  Two similar-appearing foci at the 12:00 axis of the right breast extending up to 1.7 cm anterior to the patient's site of known carcinoma.  Recommend representative tissue sampling of the more anterior focus as these may represent additional sites of disease.  No MRI evidence of malignancy in the left breast.  No axillary or internal mammary adenopathy    Addendum for clarification of extramammary incidental findings:  There are scattered T2 hyperintense masses throughout the liver without associated visible enhancement corresponding to cysts as characterized on prior CT chest, abdomen and pelvis dated September 6, 2024.    Labs:  Lab data are reviewed and documented in [facility] history.     Lab Results   Component Value Date    HGB 13.5 10/23/2024    HCT 43.5 10/23/2024    MCV 92 10/23/2024     10/23/2024    WBC 6.27 10/23/2024    NRBC 0 10/23/2024     Lab Results   Component Value Date    K 4.5 10/23/2024     10/23/2024    CO2 30 10/23/2024    BUN 18 10/23/2024    CREATININE 0.87 10/23/2024    GLUF 88 08/26/2024    CALCIUM 9.3 10/23/2024    AST 19 10/23/2024    ALT 22 10/23/2024    ALKPHOS 55 10/23/2024    EGFR 66 10/23/2024     Assessment/plan:     1.  Stage IA (pT1c(m) pN0(sn) cM0) right breast invasive lobular carcinoma.  Grade 2.  Greatest dimension of largest invasive focus 11 mm.  ER/HI positive, HER2 negative.  Diagnosed October 2024.      Samaria  Charbel is a 74 y.o. female with stage IA right breast invasive lobular carcinoma, ER/MA positive, HER2 negative. She was noted to have abnormal routine screening mammogram which subsequently led to further diagnostic workup. CT scan of the chest, abdomen, pelvis noted nonspecific right middle lobe micronodule and patient elected to repeat CT chest in 3 months.  She was also noted to have cyst in the liver and is to repeat scans in 3 months.  She underwent right breast mastectomy and left breast mastectomy on 10/28/2024.  Final pathology was consistent with a multifocal invasive lobular carcinoma with focal ductal features on the right, size of individual foci 11 mm, 8 mm, 3 mm, 3 mm, negative margins, lymph node negative for metastatic carcinoma.  Left breast mastectomy noted benign breast tissue with moderate usual ductal hyperplasia, intraductal papilloma, columnar cell change and apocrine metaplasia, 1 lymph node benign.    I discussed with the patient the clinical course leading up to their cancer diagnosis. I reviewed relevant office notes, imaging reports and pathology result as well. I personally reviewed the lab results, the imaging studies, pathology, other specialty/physicians consult notes and recommendations, and outside medical records. I had a lengthy discussion with the patient and shared the work-up findings. We discussed the diagnosis and management plan.  Recommendations reviewed per NCCN guidelines.    Given her tumor size, we will request MammaPrint to determine if there will be benefit from adjuvant chemotherapy.  If results are low risk then we will consider adjuvant endocrine therapy with anastrozole 1 mg daily. We reviewed the side effects of aromatase inhibitors including, but not limited to hot flashes, vaginal dryness, mood swings, weight gain, difficulty sleeping, decreased sexual interest, arthralgias/myalgias and worsening of osteopenia/osteoporosis. Patient signed informed consent after  all of her questions were answered to her satisfaction.  She had a teaching session with RN.  Rx for anastrozole will be sent once MammaPrint results are received.    2.  Osteopenia  Close bone health management per PCP.    No orders of the defined types were placed in this encounter.     Diagnosis ICD-10-CM Associated Orders   1. Malignant neoplasm of upper-inner quadrant of right breast in female, estrogen receptor positive (HCC)  C50.211 Ambulatory referral to Hematology / Oncology    Z17.0           Return in 3 months (on 3/10/2025) for Office Visit.    Liza Woodall, DO 12/10/2024   Hematology & Medical Oncology Physician    Disclaimer: This document was prepared using [employer] Direct technology. If a word or phrase is confusing, or does not make sense, this is likely due to recognition error which was not discovered during this clinician's review. If you believe an error has occurred, please contact me through [employer] service line for [email or web address]?cation.

## 2024-12-11 ENCOUNTER — CLINICAL SUPPORT (OUTPATIENT)
Dept: OBGYN CLINIC | Facility: OTHER | Age: 74
End: 2024-12-11

## 2024-12-11 DIAGNOSIS — C50.211 MALIGNANT NEOPLASM OF UPPER-INNER QUADRANT OF RIGHT BREAST IN FEMALE, ESTROGEN RECEPTOR POSITIVE (HCC): Primary | ICD-10-CM

## 2024-12-11 DIAGNOSIS — Z17.0 MALIGNANT NEOPLASM OF UPPER-INNER QUADRANT OF RIGHT BREAST IN FEMALE, ESTROGEN RECEPTOR POSITIVE (HCC): Primary | ICD-10-CM

## 2024-12-11 NOTE — PROGRESS NOTES
Mastectomy Bra Fitting Order Details    Samaria Barger  1950  [id number]    Reason For Visit  Mastectomy bra and prosthesis     Precautions  History of breast cancer     Subjective  Samaria underwent a bilateral mastectomy without reconstruction. She is feeling well. She is not tender along her incision.     Surgery Type: Mastectomy    Lymph node removal yes    Date of surgery 10/28/2024    Surgical side bilateral    Objective  Samaria has a well healed chest wall. She was educated on prosthesis and bras. Will begin wearing leisure forms for the first few months until she is ready to transition to heavier silicone prosthesis.     Assesment  Band - 18.5 x 2 = 37 + 5 = 42      Plan  Ship remainder of her order to home. She was educated on the wear and care of her products. Will call in the beginning of January to order more bras.     Order Details   B/l mastectomy 10/26/2024  Glorai XL nude x 1 - received on 12/11/2024  Leisure form 132N size 7 x 2 - received on 12/11/2024  Adapt air XTRA light style 326 size 7 x 2 - ship to home

## 2024-12-12 ENCOUNTER — EVALUATION (OUTPATIENT)
Dept: PHYSICAL THERAPY | Facility: REHABILITATION | Age: 74
End: 2024-12-12
Payer: MEDICARE

## 2024-12-12 DIAGNOSIS — C50.211 MALIGNANT NEOPLASM OF UPPER-INNER QUADRANT OF RIGHT BREAST IN FEMALE, ESTROGEN RECEPTOR POSITIVE (HCC): ICD-10-CM

## 2024-12-12 DIAGNOSIS — Z91.89 AT RISK FOR LYMPHEDEMA: Primary | ICD-10-CM

## 2024-12-12 DIAGNOSIS — Z17.0 MALIGNANT NEOPLASM OF UPPER-INNER QUADRANT OF RIGHT BREAST IN FEMALE, ESTROGEN RECEPTOR POSITIVE (HCC): ICD-10-CM

## 2024-12-12 PROCEDURE — 97161 PT EVAL LOW COMPLEX 20 MIN: CPT | Performed by: PHYSICAL THERAPIST

## 2024-12-12 PROCEDURE — 97110 THERAPEUTIC EXERCISES: CPT | Performed by: PHYSICAL THERAPIST

## 2024-12-12 PROCEDURE — 97530 THERAPEUTIC ACTIVITIES: CPT | Performed by: PHYSICAL THERAPIST

## 2024-12-12 NOTE — PROGRESS NOTES
Strength After Breast Cancer Evaluation    Today's Date: 2024  Patient name: Samaria Barger  : 1950  MRN: [id number]  Referring provider: Sha Walden*  Dx:  Encounter Diagnosis     ICD-10-CM    1. At risk for lymphedema  Z91.89       2. Malignant neoplasm of upper-inner quadrant of right breast in female, estrogen receptor positive (HCC)  C50.211 Ambulatory referral to PT/OT lymphedema therapy    Z17.0           Assessment    Assessment details: Samaria Barger is a 74 y.o. female with history of R breast cancer stage 1B s/p bilateral mastectomy 6 weeks ago presenting for rehabilitation of upper quarter function.  She is a good candidate for Strength After Breast Cancer program due to impairments of upper quarter strength and mobility. Upon examination, she does not have UE or breast lymphedema. Skilled rehabilitative exercise program to include aerobic, strengthening of core/UE/LE, and generalized flexibility. She will be educated on monitoring symptoms of pain vs lymphedema and how to progress exercise safely and effectively.     During initial evaluation, education was provided on lymphatic anatomy and physiology, individualized lymphedema risk; signs and symptoms for self-monitoring, lymphedema risk reduction behaviors, and healthy habits. She is in agreement with recommended plan of care and goals for therapy, and demonstrates motivation for active participation in proposed plan of care.   Understanding of Dx/Px/POC: excellent     Prognosis: excellent    Goals  -Patient will demonstrate no worsening of lymphedema volumetrics after 4 weeks of progressive resistance exercise.  - Patient will demonstrate safe and effective progression of strengthening, aerobic, flexibility, and balance exercise in 4 weeks.  -Upon discharge, patient will verbalize how/when to progress/regress strengthening exercise and how/when to follow up with a certified lymphedema therapist for  re-evaluation.    Plan    Frequency: 1x week  Duration in weeks: 4  Plan of Care beginning date: 12/12/2024  Plan of Care expiration date: 1/9/2025  Treatment plan discussed with: patient and referring physician        Subjective/History:  Chief Complaint: post-op mastectomy rehabilitation  Cancer history and treatments  Type of CA: R breast multifocal Invasive mammary carcinoma with ductal and lobular features  Stage: Stage IB (cT2, cN0, cM0, G1, ER+, GA+, HER2-)   stGstrstastdstest:st st1st Date of diagnosis: 8/29/2024; biopsy 9/27/24  Metastasis: no  Surgical excision: 10/28/24 Right breast mastectomy with sentinel lymph node biopsy; Prophylactic left breast mastectomy with lymph node biopsy   Drains were removed after 3 weeks  Lymph node dissection? 0/1 positive  Reconstruction: none  Radiation: not planned  Chemotherapy: TBD, undergoing testing  Hormone therapy: Tamoxifen planned  Care team: I[facility] (surgery)      Lymphedema special questions  Feeling of heaviness, fullness, pressure? no  Sleeping location? 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  Patient goals: resume exercise  Functional self-reported outcome measure: FACT-B: 13    Objective  Lymphedema Exam: Upper Extremity  Hand dominance: right  Chest Wall exam: bilateral  Healing mastectomy incisions and drain ports; full sensation of chest wall, no pain to palpation; moderate restriction at midline of mastectomy scars bilaterally; on L, the steristrips and skin glue are still in  place-- advised patient to gently wash with washcloth or Loofa to remove  Upper extremity Edema location: right - no edema present. Baseline measurements taken  Upper extremity Edema location: left - no edema present. Baseline measurements taken  Lymphedema classification (0 latent, 1 reverse, 2 non-rev, 3 elephantiasis): 0  >> REFER TO FLOW SHEET FOR GIRTH MEASUREMENTS <<    Skin Assessment:  Stemmer's sign? no  Fibrosis (0 normal - 4 >severe): 0  Wound present?: no  Skin quality: WNL    Functional Capacity:  Gait assessment: indep  Transfer status: indep  Ability to don/doff clothing/garments: indep    Upper Quarter MSK exam:  Upper quarter Sensation: Normal  Upper quarter Range of Motion: Normal  Shldr OH flex: 150 PHOEBE painfree  Shldr OH abd: 150 PHOEBE painfree  Shldr ER: T4 on R, T3 on L  Shldr IR: T7 on R, T9 on L  Upper Quarter Strength: Normal  Shldr OH flex: 4+/5 PHOEBE  Shldr OH abd: 4+/5 PHOEBE  Shldr ER: 4/5 PHOEBE  Shldr IR: 5/5 PHOEBE   Elbow flex/ext: 5/5 PHOEBE  ULTT: negative median and ulnar  Shoulder pain provocation tests: negative empty can  Axillary Web Syndrome? No, per palpation and mobility exam       Precautions: h/o breast CA    POC expires Auth Status? (BOMN, approved, pending) Unit limit (Daily) Auth Start date Expiration date PT/OT + Visit Limit?   1/9/25          Date of Service 12/12        Visits used 1        Visits remaining         Compression Rx         N/a                           Manual Ther         volumetrics completed        MLD N/a        Scar mobilization  **                         Ther Ex         SABC aerobic         SABC flexibility Seated thoracic ext at chair        SABC strengthening         Balance exercise  **       Ther Activity & Self Care                                             Pt Education         Postmastectomy rehab 5'        Lymphedema A&P and risk 5'

## 2024-12-19 ENCOUNTER — LAB REQUISITION (OUTPATIENT)
Dept: LAB | Facility: HOSPITAL | Age: 74
End: 2024-12-19

## 2024-12-19 DIAGNOSIS — C50.811 MALIGNANT NEOPLASM OF OVERLAPPING SITES OF RIGHT FEMALE BREAST (HCC): ICD-10-CM

## 2024-12-19 LAB — SCAN RESULT: NORMAL

## 2024-12-23 ENCOUNTER — TELEPHONE (OUTPATIENT)
Dept: OBGYN CLINIC | Facility: OTHER | Age: 74
End: 2024-12-23

## 2024-12-27 ENCOUNTER — RESULTS FOLLOW-UP (OUTPATIENT)
Dept: HEMATOLOGY ONCOLOGY | Facility: CLINIC | Age: 74
End: 2024-12-27

## 2024-12-27 DIAGNOSIS — Z17.0 MALIGNANT NEOPLASM OF RIGHT BREAST IN FEMALE, ESTROGEN RECEPTOR POSITIVE, UNSPECIFIED SITE OF BREAST (HCC): Primary | ICD-10-CM

## 2024-12-27 DIAGNOSIS — Z17.0 MALIGNANT NEOPLASM OF UPPER-INNER QUADRANT OF RIGHT BREAST IN FEMALE, ESTROGEN RECEPTOR POSITIVE (HCC): ICD-10-CM

## 2024-12-27 DIAGNOSIS — C50.211 MALIGNANT NEOPLASM OF UPPER-INNER QUADRANT OF RIGHT BREAST IN FEMALE, ESTROGEN RECEPTOR POSITIVE (HCC): ICD-10-CM

## 2024-12-27 DIAGNOSIS — C50.911 MALIGNANT NEOPLASM OF RIGHT BREAST IN FEMALE, ESTROGEN RECEPTOR POSITIVE, UNSPECIFIED SITE OF BREAST (HCC): Primary | ICD-10-CM

## 2024-12-27 DIAGNOSIS — C50.919 INVASIVE LOBULAR CARCINOMA OF BREAST IN FEMALE (HCC): ICD-10-CM

## 2024-12-27 RX ORDER — ANASTROZOLE 1 MG/1
1 TABLET ORAL DAILY
Qty: 90 TABLET | Refills: 1 | Status: SHIPPED | OUTPATIENT
Start: 2024-12-27

## 2024-12-27 NOTE — TELEPHONE ENCOUNTER
----- Message from Liza Woodall DO sent at 12/27/2024  9:32 AM EST -----  Please notify patient that her MammaPrint results in low risk.  Chemotherapy is not recommended.  We discussed endocrine therapy with anastrozole 1 mg daily during our office visit and if patient is agreeable we will send Rx to pharmacy.

## 2025-01-09 ENCOUNTER — OFFICE VISIT (OUTPATIENT)
Dept: PHYSICAL THERAPY | Facility: REHABILITATION | Age: 75
End: 2025-01-09
Payer: MEDICARE

## 2025-01-09 DIAGNOSIS — C50.211 MALIGNANT NEOPLASM OF UPPER-INNER QUADRANT OF RIGHT BREAST IN FEMALE, ESTROGEN RECEPTOR POSITIVE (HCC): Primary | ICD-10-CM

## 2025-01-09 DIAGNOSIS — Z91.89 AT RISK FOR LYMPHEDEMA: ICD-10-CM

## 2025-01-09 DIAGNOSIS — Z17.0 MALIGNANT NEOPLASM OF UPPER-INNER QUADRANT OF RIGHT BREAST IN FEMALE, ESTROGEN RECEPTOR POSITIVE (HCC): Primary | ICD-10-CM

## 2025-01-09 PROCEDURE — 97110 THERAPEUTIC EXERCISES: CPT | Performed by: PHYSICAL THERAPIST

## 2025-01-09 NOTE — PROGRESS NOTES
Daily Note     Today's date: 2025  Patient name: Samaria Barger  : 1950  MRN: [id number]  Referring provider: Sha Walden Has*  Dx:   Encounter Diagnosis     ICD-10-CM    1. Malignant neoplasm of upper-inner quadrant of right breast in female, estrogen receptor positive (HCC)  C50.211     Z17.0       2. At risk for lymphedema  Z91.89         Subjective: Feeling less chest wall tightness from stretches. Excited to start the [facility] program.    Objective: See treatment diary below    Assessment: Trained patient in TM walking for aerobic exercise, unable to tolerate upright bike due to knee arthritis. Then use of resistance band for upper quarter strengthening, with body weight lower quarter strengthening. Good tolerance and safe technique demonstrated, so HEP implemented.    Plan: Continue per plan of care.      Precautions: h/o breast CA    POC expires Auth Status? (BOMN, approved, pending) Unit limit (Daily) Auth Start date Expiration date PT/OT + Visit Limit?   25          Date of Service        Visits used 1 1       Visits remaining 99 99       Compression Rx         N/a                           Manual Ther         volumetrics completed        MLD N/a        Scar mobilization   **                        Ther Ex         SABC aerobic  TM walking       SABC flexibility Seated thoracic ext at chair        SABC strengthening  Bridge x20  STS x20  HR x20  DL w band x20  Bicep curl x20  Row x20  Chest press x20  Scaption 2x10       Balance exercise   **      Ther Activity & Self Care                                             Pt Education         Postmastectomy rehab 5'        Lymphedema A&P and risk 5'

## 2025-01-15 DIAGNOSIS — E78.2 MIXED HYPERLIPIDEMIA: ICD-10-CM

## 2025-01-15 RX ORDER — ROSUVASTATIN CALCIUM 10 MG/1
10 TABLET, COATED ORAL DAILY
Qty: 90 TABLET | Refills: 1 | Status: SHIPPED | OUTPATIENT
Start: 2025-01-15

## 2025-01-16 ENCOUNTER — OFFICE VISIT (OUTPATIENT)
Dept: PHYSICAL THERAPY | Facility: REHABILITATION | Age: 75
End: 2025-01-16
Payer: MEDICARE

## 2025-01-16 DIAGNOSIS — C50.211 MALIGNANT NEOPLASM OF UPPER-INNER QUADRANT OF RIGHT BREAST IN FEMALE, ESTROGEN RECEPTOR POSITIVE (HCC): Primary | ICD-10-CM

## 2025-01-16 DIAGNOSIS — Z91.89 AT RISK FOR LYMPHEDEMA: ICD-10-CM

## 2025-01-16 DIAGNOSIS — Z17.0 MALIGNANT NEOPLASM OF UPPER-INNER QUADRANT OF RIGHT BREAST IN FEMALE, ESTROGEN RECEPTOR POSITIVE (HCC): Primary | ICD-10-CM

## 2025-01-16 PROCEDURE — 97110 THERAPEUTIC EXERCISES: CPT | Performed by: PHYSICAL THERAPIST

## 2025-01-16 NOTE — PROGRESS NOTES
Daily Note     Today's date: 2025  Patient name: Samaria Barger  : 1950  MRN: [id number]  Referring provider: Sha Walden*  Dx:   Encounter Diagnosis     ICD-10-CM    1. Malignant neoplasm of upper-inner quadrant of right breast in female, estrogen receptor positive (HCC)  C50.211     Z17.0       2. At risk for lymphedema  Z91.89         Subjective: Performing strengthening exercises for 25 minutes daily, with supine Bridge being the most challenging.    Objective: See treatment diary below    Assessment: Trained patient in TM walking for aerobic exercise, unable to tolerate upright bike due to knee arthritis. Then use of resistance band for upper quarter strengthening, with body weight lower quarter strengthening. Good tolerance and safe technique demonstrated, so HEP implemented.    Plan: Continue per plan of care.      Precautions: h/o breast CA    Access Code: [email or web address]  URL: [email or web address]  Date: 2025  Prepared by: Selin Deyimling    Exercises  - Tandem Walking Along Line  - 1 x daily - 2 sets - 30 reps  - March in Place  - 1 x daily - 30 reps  - Single Leg Stance  - 1 x daily - 30 reps    POC expires Auth Status? (BOMN, approved, pending) Unit limit (Daily) Auth Start date Expiration date PT/OT + Visit Limit?   25          Date of Service        Visits used 1 1       Visits remaining 99 99       Compression Rx         N/a                           Manual Ther         volumetrics completed        MLD N/a        Scar mobilization   **                        Ther Ex         SABC aerobic  TM walking 10' 1.7 mph TM walking 5' 1.7mph      SABC flexibility Seated thoracic ext at chair        SABC strengthening  Bridge x20  STS x20  HR x20  DL w band x20  Bicep curl x20  Row x20  Chest press x20  Scaption 2x10 HR x30  STS x20  Lateral arm raise 3# x20  Bicep curl band x20  STACEY Row band x20  Triceps extension weights x20, band x20  Supine bridge x20         Balance exercise   March in place 2x20  Tandem stance 4x 15  EC on foam 2x30 sec      Ther Activity & Self Care                                             Pt Education         Postmastectomy rehab 5'        Lymphedema A&P and risk 5'

## 2025-01-23 ENCOUNTER — OFFICE VISIT (OUTPATIENT)
Dept: PHYSICAL THERAPY | Facility: REHABILITATION | Age: 75
End: 2025-01-23
Payer: MEDICARE

## 2025-01-23 ENCOUNTER — TELEPHONE (OUTPATIENT)
Dept: OBGYN CLINIC | Facility: OTHER | Age: 75
End: 2025-01-23

## 2025-01-23 DIAGNOSIS — Z91.89 AT RISK FOR LYMPHEDEMA: ICD-10-CM

## 2025-01-23 DIAGNOSIS — Z17.0 MALIGNANT NEOPLASM OF UPPER-INNER QUADRANT OF RIGHT BREAST IN FEMALE, ESTROGEN RECEPTOR POSITIVE (HCC): Primary | ICD-10-CM

## 2025-01-23 DIAGNOSIS — C50.211 MALIGNANT NEOPLASM OF UPPER-INNER QUADRANT OF RIGHT BREAST IN FEMALE, ESTROGEN RECEPTOR POSITIVE (HCC): Primary | ICD-10-CM

## 2025-01-23 PROCEDURE — 97110 THERAPEUTIC EXERCISES: CPT | Performed by: PHYSICAL THERAPIST

## 2025-01-23 NOTE — TELEPHONE ENCOUNTER
I called patient and asked if she is interested in placing a re-order for the wear and tear of her bras she said yes.     Patient ordered:   Gloria reyes, XL x3

## 2025-01-23 NOTE — PROGRESS NOTES
Daily Note     Today's date: 2025  Patient name: Samaria Bargre  : 1950  MRN: [id number]  Referring provider: Sha Walden Has*  Dx:   Encounter Diagnosis     ICD-10-CM    1. Malignant neoplasm of upper-inner quadrant of right breast in female, estrogen receptor positive (HCC)  C50.211     Z17.0       2. At risk for lymphedema  Z91.89         Subjective: Performing strengthening exercises for 25 minutes daily, with supine Bridge being the most challenging.    Objective: See treatment diary below    Assessment: Continued with aerobic exercise and strengthening exercises with dumbbells. Reviewed how/when to progress resistance, rosa in a gym setting. Also progressed balance exercises and tandem walk is going well. Very limited ankle and hip strategies with SLS, so fingertip touch for HEP implemented.     Plan: discharge next visit.     Precautions: h/o breast CA    Access Code: [email or web address]  URL: [email or web address]  Date: 2025  Prepared by: Selni Steimling    Exercises  - Tandem Walking Along Line  - 1 x daily - 2 sets - 30 reps  - March in Place  - 1 x daily - 30 reps  - Single Leg Stance  - 1 x daily - 30 reps    POC expires Auth Status? (BOMN, approved, pending) Unit limit (Daily) Auth Start date Expiration date PT/OT + Visit Limit?   25          Date of Service      Visits used 1 1       Visits remaining 99 99       Compression Rx         N/a                           Manual Ther         volumetrics completed        MLD N/a        Scar mobilization   **                        Ther Ex         SABC aerobic  TM walking 10' 1.7 mph TM walking 5' 1.7mph NuStep 10' L3     SABC flexibility Seated thoracic ext at chair        SABC strengthening  Bridge x20  STS x20  HR x20  DL w band x20  Bicep curl x20  Row x20  Chest press x20  Scaption 2x10 HR x30  STS x20  Lateral arm raise 3# x20  Bicep curl band x20  STACEY Row band x20  Triceps extension weights x20, band  "x20  Supine bridge x20   HR x30  Lateral arm raise 5# 2x10  Bicep curl 7# x20  STACEY triceps ext 7# x10; OH triceps 5# x10  Deadlift 20# KB 2x10     Balance exercise   March in place 2x20  Tandem stance 4x 15  EC on foam 2x30 sec Tandem walk 4x20 ft  Sidestepping 4x20 ft  SLS 5\" x10 each     Ther Activity & Self Care                                             Pt Education         Postmastectomy rehab 5'        Lymphedema A&P and risk 5'                                        "

## 2025-02-05 ENCOUNTER — OFFICE VISIT (OUTPATIENT)
Dept: PHYSICAL THERAPY | Facility: REHABILITATION | Age: 75
End: 2025-02-05
Payer: MEDICARE

## 2025-02-05 DIAGNOSIS — Z91.89 AT RISK FOR LYMPHEDEMA: ICD-10-CM

## 2025-02-05 DIAGNOSIS — Z17.0 MALIGNANT NEOPLASM OF UPPER-INNER QUADRANT OF RIGHT BREAST IN FEMALE, ESTROGEN RECEPTOR POSITIVE (HCC): Primary | ICD-10-CM

## 2025-02-05 DIAGNOSIS — C50.211 MALIGNANT NEOPLASM OF UPPER-INNER QUADRANT OF RIGHT BREAST IN FEMALE, ESTROGEN RECEPTOR POSITIVE (HCC): Primary | ICD-10-CM

## 2025-02-05 PROCEDURE — 97110 THERAPEUTIC EXERCISES: CPT | Performed by: PHYSICAL THERAPIST

## 2025-02-05 NOTE — PROGRESS NOTES
Daily Note     Today's date: 2025  Patient name: Samaria Barger  : 1950  MRN: [id number]  Referring provider: Sha Walden Has*  Dx:   Encounter Diagnosis     ICD-10-CM    1. Malignant neoplasm of upper-inner quadrant of right breast in female, estrogen receptor positive (HCC)  C50.211     Z17.0       2. At risk for lymphedema  Z91.89         Subjective: Performing strengthening exercises for 25 minutes daily, with supine Bridge being the most challenging.    Objective: See treatment diary below    Assessment: Continued with aerobic exercise and strengthening exercises with dumbbells. Reviewed how/when to progress resistance, rosa in a gym setting. Also progressed balance exercises and tandem walk is going well. Improved standing balance demonstrated today. Improved aerobic endurance demonstrated today. She is safe and knowledgable about long-term exercise and is appropriate for discharge today.    Goals:  -Patient will demonstrate no worsening of lymphedema volumetrics after 4 weeks of progressive resistance exercise. MET  - Patient will demonstrate safe and effective progression of strengthening, aerobic, flexibility, and balance exercise in 4 weeks. MET  -Upon discharge, patient will verbalize how/when to progress/regress strengthening exercise and how/when to follow up with a certified lymphedema therapist for re-evaluation. MET    Plan: discharge     Precautions: h/o breast CA    Access Code: [email or web address]  URL: [email or web address]  Date: 2025  Prepared by: Selin Steimling    Exercises  - Tandem Walking Along Line  - 1 x daily - 2 sets - 30 reps  - March in Place  - 1 x daily - 30 reps  - Single Leg Stance  - 1 x daily - 30 reps    POC expires Auth Status? (BOMN, approved, pending) Unit limit (Daily) Auth Start date Expiration date PT/OT + Visit Limit?   25          Date of Service  2/    Visits used 1 1       Visits remaining 99 99       Compression Rx   "       N/a                           Manual Ther         volumetrics completed        MLD N/a        Scar mobilization                           Ther Ex         SABC aerobic  TM walking 10' 1.7 mph TM walking 5' 1.7mph NuStep 10' L3     SABC flexibility Seated thoracic ext at chair        SABC strengthening  Bridge x20  STS x20  HR x20  DL w band x20  Bicep curl x20  Row x20  Chest press x20  Scaption 2x10 HR x30  STS x20  Lateral arm raise 3# x20  Bicep curl band x20  STACEY Row band x20  Triceps extension weights x20, band x20  Supine bridge x20   HR x30  Lateral arm raise 5# 2x10  Bicep curl 7# x20  STACEY triceps ext 7# x10; OH triceps 5# x10  Deadlift 20# KB 2x10 HR x30  Incline push-up 2x10  Lateral arm raise 5# x20  OH press 4# x15  Scaption 4# x20  Lateral hip abd standing x20 ea  Bicep curl 7# x20  Deadlift 7#DB x20    Balance exercise   March in place 2x20  Tandem stance 4x 15  EC on foam 2x30 sec Tandem walk 4x20 ft  Sidestepping 4x20 ft  SLS 5\" x10 each Tandem walk 4x30ft forward, 2x30ft backward.    Sidestepping 4x30ft    Ther Activity & Self Care                                             Pt Education         Postmastectomy rehab 5'        Lymphedema A&P and risk 5'                                        "

## 2025-02-12 ENCOUNTER — TELEPHONE (OUTPATIENT)
Age: 75
End: 2025-02-12

## 2025-02-12 NOTE — TELEPHONE ENCOUNTER
Patient calling to have her mychart unlocked.  I did the work around with the patient to see if we were able to get her into  her mychart but it still shows locked out.  I let patient know I would present to manager to help speed this up for her and will call her once we are able to unlock.  Patient was very much appreciative, as she has been waiting for IT to call her back for weeks now.

## 2025-02-12 NOTE — TELEPHONE ENCOUNTER
Spoke with manager and was able to unlock patients [facility] account. Email was sent to her email on file     [email or web address]     Called patient and made her aware of email.  Patient expressed sincere gratitude.    No further action needed

## 2025-03-05 PROBLEM — Z85.3 ENCOUNTER FOR FOLLOW-UP SURVEILLANCE OF BREAST CANCER: Status: ACTIVE | Noted: 2025-03-05

## 2025-03-05 PROBLEM — Z08 ENCOUNTER FOR FOLLOW-UP SURVEILLANCE OF BREAST CANCER: Status: ACTIVE | Noted: 2025-03-05

## 2025-03-05 PROBLEM — Z79.811 USE OF ANASTROZOLE: Status: ACTIVE | Noted: 2025-03-05

## 2025-03-07 ENCOUNTER — OFFICE VISIT (OUTPATIENT)
Dept: SURGICAL ONCOLOGY | Facility: CLINIC | Age: 75
End: 2025-03-07
Payer: MEDICARE

## 2025-03-07 VITALS
OXYGEN SATURATION: 96 % | SYSTOLIC BLOOD PRESSURE: 110 MMHG | TEMPERATURE: 98.3 F | WEIGHT: 208 LBS | HEART RATE: 76 BPM | HEIGHT: 65 IN | DIASTOLIC BLOOD PRESSURE: 78 MMHG | BODY MASS INDEX: 34.66 KG/M2

## 2025-03-07 DIAGNOSIS — C50.211 MALIGNANT NEOPLASM OF UPPER-INNER QUADRANT OF RIGHT BREAST IN FEMALE, ESTROGEN RECEPTOR POSITIVE (HCC): ICD-10-CM

## 2025-03-07 DIAGNOSIS — Z08 ENCOUNTER FOR FOLLOW-UP SURVEILLANCE OF BREAST CANCER: Primary | ICD-10-CM

## 2025-03-07 DIAGNOSIS — Z90.13 STATUS POST MASTECTOMY, BILATERAL: ICD-10-CM

## 2025-03-07 DIAGNOSIS — Z85.3 ENCOUNTER FOR FOLLOW-UP SURVEILLANCE OF BREAST CANCER: Primary | ICD-10-CM

## 2025-03-07 DIAGNOSIS — Z17.0 MALIGNANT NEOPLASM OF UPPER-INNER QUADRANT OF RIGHT BREAST IN FEMALE, ESTROGEN RECEPTOR POSITIVE (HCC): ICD-10-CM

## 2025-03-07 DIAGNOSIS — Z79.811 USE OF ANASTROZOLE: ICD-10-CM

## 2025-03-07 PROCEDURE — 99215 OFFICE O/P EST HI 40 MIN: CPT | Performed by: SURGERY

## 2025-03-07 NOTE — PROGRESS NOTES
Surgical Oncology Follow Up  [facility]  [facility] [place]  [address] [facility] [id number]    Samaria Barger  1950  [id number]      Chief Complaint   Patient presents with   • Follow-up     3 Month Follow Up        Assessment & Plan:   74-year-old female with right breast cancer s/p bilateral mastectomy and on anastrozole she is doing well no major side effects such as shortness of breath leg swelling or vaginal spotting.  No evidence of local regional recurrence.  She will need a CT of the abdomen which we are going to order to delineate the liver will follow her in 6 months.  Cancer History:     Oncology History   Malignant neoplasm of upper-inner quadrant of right breast in female, estrogen receptor positive (HCC)   8/29/2024 Biopsy    Right breast stereotactic biopsy  1 o'clock  Invasive lobular carcinoma  Histologic grade 1   ER 90  SC 60  HER2 0    The pathology results indicate a Malignant finding with invasive lobular carcinoma and lobular carcinoma in situ (lcis). Radiology and pathology are concordant.  Right malignancy appears unifocal. Suspicious architectural distortion cover an area of 1 cm. Clip was displaced medially by 0.7 cm and inferiorly by 1 cm.   Ultrasound of the right axilla was not previously performed. Recent imaging of the contralateral left breast dated July 19, 2024 was reviewed and shows no suspicious findings.  Surgical Consultation for the right breast.  Patient should also potentially undergo right axillary ultrasound as not previously performed.  Given the lobular nature of the carcinoma, breast MRI should be considered      9/17/2024 Genetic Testing    [facility]  A total of 59 genes were evaluated, including: APC, TEREZA, BARD 1, BMPR1A, BRCA1, BRCA2, BRIP1, CDH1, CDK4, CDKN1B, CKDN2A, CHEK2, DICER1, FH, FLCN, KIF1B, MAX, MEN1, MET, MLH1, MSH2, MSH6, MUTYH, NBN, NF1, NTHL1, PALB2, PMS2, POT1, PTEN, RAD51C, RAD51D, RB1, RET,  SDHA, SDHAF2, SDHB, SDHC, SDHD, SMAD4, SMARCA4, STK11, WMHB141, TP53, TSC1, TSC2, VHL (sequencing and depletion/duplication); AXIN2, CTNNA1, EGLN1, HOXB13, KIT, MITF, MSH3, PDGFRA, POLD1, AND POLE (sequencing only); EPCAM, and GREM1 (depletion/duplication only)  Negative result. No pathogenic sequence variants or deletions/duplications identified     9/27/2024 Biopsy    Right breast MRI guided biopsy   12 o'clock 7cm from nipple  Invasive mammary carcinoma with ductal and lobular features  Boutte grade 1 of 3  Lymph-vascular invasion not identified  ER >95  MS 1-5%  HER2 1+    Radiology and pathology are concordant. Right malignancy appears multifocal. Suspicious masses cover an area of 3.0 cm (AP) by 0.8 cm (transverse) measured on MRI guided breast biopsy performed 9/27/2024.  This does include the second focus not sampled.  Ultrasound of the right axilla was not performed but there is no suspicious adenopathy on breast MRI performed 9/10/2024.  Recent imaging of the contralateral left breast including screening mammogram dated 7/19/2024 and breast MRI performed 9/10/2024 was reviewed and shows no suspicious findings.     9/27/2024 -  Cancer Staged    Staging form: Breast, AJCC 8th Edition  - Clinical stage from 9/27/2024: Stage IB (cT2, cN0, cM0, G1, ER+, MS+, HER2-) - Signed by Sha Walden MD on 10/7/2024  Stage prefix: Initial diagnosis  Histologic grading system: 3 grade system       10/28/2024 Surgery    Bilateral breast mastectomy right sentinel lymph node biopsy  RIGHT   Multifocal invasive lobular carcinoma with focal ductal features   Grade 2  11 mm, 8 mm, 3 mm, 3 mm  Margins negative   0/1 Lymph node    LEFT   Benign breast tissue with moderate usual ductal hyperplasia, intraductal papilloma (2 mm), columnar cell change, and apocrine metaplasia.  One benign lymph node     10/28/2024 -  Cancer Staged    Staging form: Breast, AJCC 8th Edition  - Pathologic stage from 10/28/2024: Stage IA  (pT1c, pN0, cM0, G2, ER+, TX+, HER2-) - Signed by Liza Woodall DO on 12/15/2024  Stage prefix: Initial diagnosis  Multigene prognostic tests performed: None  Histologic grading system: 3 grade system       12/2024 -  Hormone Therapy    Anastrozole 1mg  Dr. Woodall           Interval History:   Follow-up with right breast cancer    Review of Systems:   Review of Systems   Constitutional:  Negative for chills and fever.   HENT:  Negative for ear pain and sore throat.    Eyes:  Negative for pain and visual disturbance.   Respiratory:  Negative for cough and shortness of breath.    Cardiovascular:  Negative for chest pain and palpitations.   Gastrointestinal:  Negative for abdominal pain and vomiting.   Genitourinary:  Negative for dysuria and hematuria.   Musculoskeletal:  Negative for arthralgias and back pain.   Skin:  Negative for color change and rash.   Neurological:  Negative for seizures and syncope.   All other systems reviewed and are negative.    Past Medical History     Patient Active Problem List   Diagnosis   • Mixed hyperlipidemia   • Class 1 obesity due to excess calories without serious comorbidity in adult   • Malignant neoplasm of upper-inner quadrant of right breast in female, estrogen receptor positive (HCC)   • Encounter for follow-up surveillance of breast cancer   • Use of anastrozole     Past Medical History:   Diagnosis Date   • Anxiety    • Bradycardia 04/03/2020   • Lightheaded 01/17/2022   • Vertigo      Past Surgical History:   Procedure Laterality Date   • LYMPH NODE BIOPSY Right 10/28/2024    Procedure: LYMPHATIC MAPPING WITH BLUE AND RADIOACTIVE DYES, SENTINEL LYMPH NODE BIOPSY, INJECTION IN OR AT 1330 BY DR WALDEN;  Surgeon: Sha Walden MD;  Location: [facility];  Service: Surgical Oncology   • MAMMO NEEDLE LOCALIZATION LEFT (ALL INC) Right 10/10/2024   • MAMMO NEEDLE LOCALIZATION LEFT (ALL INC) EACH ADD Right 10/10/2024   • MAMMO STEREOTACTIC BREAST BIOPSY RIGHT (ALL  INC) Right 08/29/2024   • MAMMO STEREOTACTIC BREAST BIOPSY RIGHT (ALL INC) Right 8/29/2024   • MRI BREAST BIOPSY RIGHT (ALL INCLUSIVE) Right 9/27/2024   • SIMPLE MASTECTOMY Bilateral 10/28/2024    Procedure: RIGHT BREAST MASTECTOMY, LEFT TOTAL MASTECTOMY, DARRON CLIPS IN THE BREASTS;  Surgeon: Sha Walden MD;  Location: [facility] OR;  Service: Surgical Oncology     Family History   Problem Relation Age of Onset   • Dementia Mother    • Heart attack Father    • No Known Problems Brother    • No Known Problems Brother    • No Known Problems Son    • Hyperlipidemia Son    • No Known Problems Son    • No Known Problems Daughter    • No Known Problems Maternal Grandmother    • No Known Problems Maternal Grandfather    • No Known Problems Paternal Grandmother    • No Known Problems Paternal Grandfather    • Brain cancer Maternal Aunt    • Coronary artery disease Maternal Uncle      Social History     Socioeconomic History   • Marital status:      Spouse name: Not on file   • Number of children: Not on file   • Years of education: Not on file   • Highest education level: Not on file   Occupational History   • Not on file   Tobacco Use   • Smoking status: Never   • Smokeless tobacco: Never   Vaping Use   • Vaping status: Never Used   Substance and Sexual Activity   • Alcohol use: Not Currently     Comment: 1 per month   • Drug use: Never   • Sexual activity: Not Currently     Birth control/protection: Post-menopausal   Other Topics Concern   • Not on file   Social History Narrative   • Not on file     Social Drivers of Health     Financial Resource Strain: Low Risk  (8/28/2023)    Overall Financial Resource Strain (CARDIA)    • Difficulty of Paying Living Expenses: Not hard at all   Food Insecurity: No Food Insecurity (10/28/2024)    Nursing - Inadequate Food Risk Classification    • Worried About Running Out of Food in the Last Year: Never true    • Ran Out of Food in the Last Year: Never true    • Ran Out  of Food in the Last Year: Never true   Transportation Needs: No Transportation Needs (2024)    OASIS : Transportation    • Lack of Transportation (Medical): No    • Lack of Transportation (Non-Medical): No    • Patient Unable or Declines to Respond: No   Physical Activity: Not on file   Stress: Not on file   Social Connections: Not on file   Intimate Partner Violence: Unknown (10/28/2024)    Nursing IPS    • Feels Physically and Emotionally Safe: Not on file    • Physically Hurt by Someone: Not on file    • Humiliated or Emotionally Abused by Someone: Not on file    • Physically Hurt by Someone: No    • Hurt or Threatened by Someone: No   Housing Stability: Unknown (10/28/2024)    Nursing: Inadequate Housing Risk Classification    • Has Housing: Not on file    • Worried About Losing Housing: Not on file    • Unable to Get Utilities: Not on file    • Unable to Pay for Housing in the Last Year: No    • Has Housin       Current Outpatient Medications:   •  anastrozole (ARIMIDEX) 1 mg tablet, Take 1 tablet (1 mg total) by mouth daily, Disp: 90 tablet, Rfl: 1  •  Calcium Ascorbate (VITAMIN C) 500 mg tablet, Take 500 mg by mouth daily, Disp: , Rfl:   •  Calcium Carb-Cholecalciferol (CALCIUM 1000 + D PO), Take 1 tablet by mouth daily, Disp: , Rfl:   •  LORazepam (Ativan) 0.5 mg tablet, Take one tablet 30 min before the MRI, then one tablet immediately before the MRI. Do not drive., Disp: 2 tablet, Rfl: 0  •  Lysine 500 MG CAPS, Take 1 capsule by mouth daily, Disp: , Rfl:   •  Multiple Vitamin (MULTIVITAMIN ADULT PO), Take 1 tablet by mouth daily, Disp: , Rfl:   •  naloxone (NARCAN) 4 mg/0.1 mL nasal spray, Administer 1 spray into a nostril. If no response after 2-3 minutes, give another dose in the other nostril using a new spray. (Patient not taking: Reported on 12/10/2024), Disp: 1 each, Rfl: 1  •  rosuvastatin (CRESTOR) 10 MG tablet, TAKE 1 TABLET BY MOUTH EVERY DAY, Disp: 90 tablet, Rfl: 1  •  Vitamin D,  Cholecalciferol, 50 MCG (2000 UT) CAPS, Take 1 tablet by mouth daily, Disp: , Rfl:   •  vitamin E, tocopherol, 400 units capsule, Take 400 Units by mouth daily, Disp: , Rfl:   Allergies   Allergen Reactions   • Atorvastatin Arthralgia       Physical Exam:     Vitals:    03/07/25 1023   BP: 110/78   Pulse: 76   Temp: 98.3 °F (36.8 °C)   SpO2: 96%     Physical Exam  Constitutional:       Appearance: Normal appearance.   HENT:      Head: Normocephalic and atraumatic.      Nose: Nose normal.      Mouth/Throat:      Mouth: Mucous membranes are moist.   Eyes:      Pupils: Pupils are equal, round, and reactive to light.   Cardiovascular:      Rate and Rhythm: Normal rate.      Pulses: Normal pulses.      Heart sounds: Normal heart sounds.   Pulmonary:      Effort: Pulmonary effort is normal.      Breath sounds: Normal breath sounds.   Chest:          Comments: Bilateral mastectomy flaps are clean.  Well-healed surgical scar.  Flaps has no palpable mass or masses.  Bilateral axillary and supraclavicular examination no palpable adenopathy.  Patient was examined seated as well as supine position.  Abdominal:      General: Bowel sounds are normal.      Palpations: Abdomen is soft.   Musculoskeletal:         General: Normal range of motion.      Cervical back: Normal range of motion and neck supple.   Skin:     General: Skin is warm.   Neurological:      General: No focal deficit present.      Mental Status: She is alert and oriented to person, place, and time.   Psychiatric:         Mood and Affect: Mood normal.         Behavior: Behavior normal.         Thought Content: Thought content normal.         Judgment: Judgment normal.       Results & Discussion:   I did review the benefit alternative and possible complications of anastrozole I did discussed in detail nature of breast cancer follow-up postmastectomy and surveillance she understands and  agrees . All patient questions were answered.       Advance Care Planning/Advance  Directives:  I Sha Walden MD discussed the disease status with Samaria Barger  today 03/07/25  treatment plans and follow-up with the patient.

## 2025-03-12 ENCOUNTER — APPOINTMENT (OUTPATIENT)
Dept: LAB | Facility: MEDICAL CENTER | Age: 75
End: 2025-03-12
Payer: MEDICARE

## 2025-03-12 DIAGNOSIS — Z08 ENCOUNTER FOR FOLLOW-UP SURVEILLANCE OF BREAST CANCER: ICD-10-CM

## 2025-03-12 DIAGNOSIS — Z85.3 ENCOUNTER FOR FOLLOW-UP SURVEILLANCE OF BREAST CANCER: ICD-10-CM

## 2025-03-12 LAB
BUN SERPL-MCNC: 23 MG/DL (ref 5–25)
CREAT SERPL-MCNC: 0.79 MG/DL (ref 0.6–1.3)
GFR SERPL CREATININE-BSD FRML MDRD: 75 ML/MIN/1.73SQ M

## 2025-03-12 PROCEDURE — 82565 ASSAY OF CREATININE: CPT

## 2025-03-12 PROCEDURE — 84520 ASSAY OF UREA NITROGEN: CPT

## 2025-03-12 PROCEDURE — 36415 COLL VENOUS BLD VENIPUNCTURE: CPT

## 2025-03-13 ENCOUNTER — HOSPITAL ENCOUNTER (OUTPATIENT)
Dept: RADIOLOGY | Facility: MEDICAL CENTER | Age: 75
Discharge: HOME | End: 2025-03-13
Payer: MEDICARE

## 2025-03-13 DIAGNOSIS — Z85.3 ENCOUNTER FOR FOLLOW-UP SURVEILLANCE OF BREAST CANCER: ICD-10-CM

## 2025-03-13 DIAGNOSIS — Z08 ENCOUNTER FOR FOLLOW-UP SURVEILLANCE OF BREAST CANCER: ICD-10-CM

## 2025-03-13 PROCEDURE — 74177 CT ABD & PELVIS W/CONTRAST: CPT

## 2025-03-13 PROCEDURE — 71260 CT THORAX DX C+: CPT

## 2025-03-13 RX ADMIN — IOHEXOL 100 ML: 350 INJECTION, SOLUTION INTRAVENOUS at 08:50

## 2025-03-18 NOTE — PROGRESS NOTES
Name: Samaria Barger      : 1950      MRN: [id number]  Encounter Provider: Liza Woodall DO  Encounter Date: 3/20/2025   Encounter department: [facility]  :  Assessment & Plan  Malignant neoplasm of upper-inner quadrant of right breast in female, estrogen receptor positive (HCC)   Cancer Staging   Malignant neoplasm of upper-inner quadrant of right breast in female, estrogen receptor positive (HCC)  Staging form: Breast, AJCC 8th Edition  - Clinical stage from 2024: Stage IB (cT2, cN0, cM0, G1, ER+, VT+, HER2-) - Signed by Sha Walden MD on 10/7/2024  Stage prefix: Initial diagnosis  Histologic grading system: 3 grade system  - Pathologic stage from 10/28/2024: Stage IA (pT1c, pN0, cM0, G2, ER+, VT+, HER2-) - Signed by Liza Woodall DO on 12/15/2024  Stage prefix: Initial diagnosis  Multigene prognostic tests performed: None  Histologic grading system: 3 grade system    1.  Stage IA (pT1c(m) pN0(sn) cM0) right breast invasive lobular carcinoma.  Grade 2.  Greatest dimension of largest invasive focus 11 mm.  ER/VT positive, HER2 negative.  MammaPrint: Low risk.  Diagnosed 2024.      Samaria Barger is a 74 y.o postmenopausal female with stage IA right breast invasive lobular carcinoma, ER/VT positive, HER2 negative. She was noted to have abnormal routine screening mammogram which subsequently led to further diagnostic workup. CT scan of the chest, abdomen, pelvis noted nonspecific right middle lobe micronodule and patient elected to repeat CT chest in 3 months.  She was also noted to have cyst in the liver and is to repeat scans in 3 months.  She underwent right breast mastectomy and left breast mastectomy on 10/28/2024.  Final pathology was consistent with a multifocal invasive lobular carcinoma with focal ductal features on the right, size of individual foci 11 mm, 8 mm, 3 mm, 3 mm, negative margins, lymph node negative for metastatic  carcinoma.  Left breast mastectomy noted benign breast tissue with moderate usual ductal hyperplasia, intraductal papilloma, columnar cell change and apocrine metaplasia, 1 lymph node benign.  MammaPrint was low risk.     She is currently receiving adjuvant endocrine therapy with anastrozole 1 mg daily (started December 2024).  Patient notes mild weight gain and worsening of her knee joint pain (R>L).  Lengthy discussion with the patient and her son.  Discussed various options. Recommend low impact exercises like swimming, yoga and stretching.  For pain she may try Tylenol.  Discussed trial of glucosamine chondroitin, but patient does not want to take another medication.  Discussed switching to a different aromatase inhibitor like letrozole which patient was agreeable.  Informed consent signed.  Patient was given a printout of the medication.    She will follow-up in 3 months to assess tolerability.    Orders:    letrozole (FEMARA) 2.5 mg tablet; Take 1 tablet (2.5 mg total) by mouth daily    Aromatase inhibitor use         2.  Osteopenia  Continues calcium/vitamin D  Close bone health management per PCP.    Return in about 3 months (around 6/20/2025) for Office Visit, On tx visit.    History of Present Illness   Chief Complaint   Patient presents with    Follow-up     Oncology History   Cancer Staging   Malignant neoplasm of upper-inner quadrant of right breast in female, estrogen receptor positive (HCC)  Staging form: Breast, AJCC 8th Edition  - Clinical stage from 9/27/2024: Stage IB (cT2, cN0, cM0, G1, ER+, MD+, HER2-) - Signed by Sha Walden MD on 10/7/2024  Stage prefix: Initial diagnosis  Histologic grading system: 3 grade system  - Pathologic stage from 10/28/2024: Stage IA (pT1c, pN0, cM0, G2, ER+, MD+, HER2-) - Signed by Liza Woodall DO on 12/15/2024  Stage prefix: Initial diagnosis  Multigene prognostic tests performed: None  Histologic grading system: 3 grade system  Oncology History    Malignant neoplasm of upper-inner quadrant of right breast in female, estrogen receptor positive (HCC)   8/29/2024 Biopsy    Right breast stereotactic biopsy  1 o'clock  Invasive lobular carcinoma  Histologic grade 1   ER 90  ID 60  HER2 0    The pathology results indicate a Malignant finding with invasive lobular carcinoma and lobular carcinoma in situ (lcis). Radiology and pathology are concordant.  Right malignancy appears unifocal. Suspicious architectural distortion cover an area of 1 cm. Clip was displaced medially by 0.7 cm and inferiorly by 1 cm.   Ultrasound of the right axilla was not previously performed. Recent imaging of the contralateral left breast dated July 19, 2024 was reviewed and shows no suspicious findings.  Surgical Consultation for the right breast.  Patient should also potentially undergo right axillary ultrasound as not previously performed.  Given the lobular nature of the carcinoma, breast MRI should be considered      9/17/2024 Genetic Testing    [place]  A total of 59 genes were evaluated, including: APC, TEREZA, BARD 1, BMPR1A, BRCA1, BRCA2, BRIP1, CDH1, CDK4, CDKN1B, CKDN2A, CHEK2, DICER1, FH, FLCN, KIF1B, MAX, MEN1, MET, MLH1, MSH2, MSH6, MUTYH, NBN, NF1, NTHL1, PALB2, PMS2, POT1, PTEN, RAD51C, RAD51D, RB1, RET, SDHA, SDHAF2, SDHB, SDHC, SDHD, SMAD4, SMARCA4, STK11, TSJH041, TP53, TSC1, TSC2, VHL (sequencing and depletion/duplication); AXIN2, CTNNA1, EGLN1, HOXB13, KIT, MITF, MSH3, PDGFRA, POLD1, AND POLE (sequencing only); EPCAM, and GREM1 (depletion/duplication only)  Negative result. No pathogenic sequence variants or deletions/duplications identified     9/27/2024 Biopsy    Right breast MRI guided biopsy   12 o'clock 7cm from nipple  Invasive mammary carcinoma with ductal and lobular features  Keesha grade 1 of 3  Lymph-vascular invasion not identified  ER >95  ID 1-5%  HER2 1+    Radiology and pathology are concordant. Right malignancy appears multifocal. Suspicious masses  cover an area of 3.0 cm (AP) by 0.8 cm (transverse) measured on MRI guided breast biopsy performed 9/27/2024.  This does include the second focus not sampled.  Ultrasound of the right axilla was not performed but there is no suspicious adenopathy on breast MRI performed 9/10/2024.  Recent imaging of the contralateral left breast including screening mammogram dated 7/19/2024 and breast MRI performed 9/10/2024 was reviewed and shows no suspicious findings.     9/27/2024 -  Cancer Staged    Staging form: Breast, AJCC 8th Edition  - Clinical stage from 9/27/2024: Stage IB (cT2, cN0, cM0, G1, ER+, OR+, HER2-) - Signed by Sha Walden MD on 10/7/2024  Stage prefix: Initial diagnosis  Histologic grading system: 3 grade system       10/28/2024 Surgery    Bilateral breast mastectomy right sentinel lymph node biopsy  RIGHT   Multifocal invasive lobular carcinoma with focal ductal features   Grade 2  11 mm, 8 mm, 3 mm, 3 mm  Margins negative   0/1 Lymph node    LEFT   Benign breast tissue with moderate usual ductal hyperplasia, intraductal papilloma (2 mm), columnar cell change, and apocrine metaplasia.  One benign lymph node     10/28/2024 -  Cancer Staged    Staging form: Breast, AJCC 8th Edition  - Pathologic stage from 10/28/2024: Stage IA (pT1c, pN0, cM0, G2, ER+, OR+, HER2-) - Signed by Liza Woodall DO on 12/15/2024  Stage prefix: Initial diagnosis  Multigene prognostic tests performed: None  Histologic grading system: 3 grade system       12/2024 -  Hormone Therapy    Anastrozole 1mg  Dr. Woodall        Primary Diagnosis:  1.  Stage IA (pT1c(m) pN0(sn) cM0) right breast invasive lobular carcinoma.  Grade 2.  Greatest dimension of largest invasive focus 11 mm.  ER/OR positive, HER2 negative.  Diagnosed October 2024.      Previous Hematologic/ Oncologic Treatment:   1.  S/p right breast mastectomy and sentinel lymph node biopsy.  Prophylactic left breast mastectomy with lymph node biopsy.  2.  Anastrozole  1 mg daily 12/2024 - 3/2025.  DC'd due to side effects.    Current hematologic/oncologic treatment:  Plan letrozole 2.5 mg.  To start 3/2025.    Pertinent Medical History   Samaria Barger is a 74 y.o. female noted to have abnormal routine screening mammogram which subsequently led to further diagnostic workup. CT scan of the chest, abdomen, pelvis noted nonspecific right middle lobe micronodule and patient elected to repeat CT chest in 3 months.  She was also noted to have cyst in the liver and is to repeat scans in 3 months.  She underwent right breast mastectomy and left breast mastectomy on 10/28/2024.  Final pathology was consistent with a multifocal invasive lobular carcinoma with focal ductal features on the right, size of individual foci 11 mm, 8 mm, 3 mm, 3 mm, negative margins, lymph node negative for metastatic carcinoma.  Left breast mastectomy noted benign breast tissue with moderate usual ductal hyperplasia, intraductal papilloma, columnar cell change and apocrine metaplasia, 1 lymph node benign.     Menarche: 15 years old  Menopause: 54 years  Age at first pregnancy: 29 years  OCPs/HRT: denies  Denies any family history of breast cancer.     She presented to medical neurology consultation.  Denies any chest pain, palpitations, respiratory symptoms or bone pain.    03/20/25:     Patient presents to follow visit.  In the interim she started anastrozole 1 mg daily in December 2024.  Patient stopped treatment on 3/17/2025 as it was causing bilateral knee pain.    Unable to exercise because of knee pain.     She continues to follow with surgery who is monitoring a lesion in her liver.  CT CAP from 3/13/2025 notes no recurrent or metastatic disease, no thoracic or abdominal lymphadenopathy, stable hepatic cyst and stable pulmonary nodules.     Review of Systems   Constitutional:  Negative for chills and fever.   Respiratory:  Negative for cough and shortness of breath.    Cardiovascular:  Negative for chest  "pain and palpitations.   Gastrointestinal:  Negative for abdominal pain.   Genitourinary:  Negative for hematuria.   Musculoskeletal:  Positive for arthralgias (R>L knee pain).   Skin:  Negative for rash.   Hematological:  Does not bruise/bleed easily.   All other systems reviewed and are negative.       Objective   /86 (Patient Position: Sitting, Cuff Size: Large)   Pulse 77   Temp (!) 97 °F (36.1 °C) (Temporal)   Resp 18   Ht 5' 5\" (1.651 m)   Wt 96.6 kg (213 lb)   SpO2 96%   BMI 35.45 kg/m²     Pain Screening:  Pain Score:   6  ECOG   1  Physical Exam    Labs: I have reviewed the following labs:  Lab Results   Component Value Date/Time    WBC 6.27 10/23/2024 03:49 PM    RBC 4.71 10/23/2024 03:49 PM    Hemoglobin 13.5 10/23/2024 03:49 PM    Hematocrit 43.5 10/23/2024 03:49 PM    MCV 92 10/23/2024 03:49 PM    MCH 28.7 10/23/2024 03:49 PM    RDW 13.3 10/23/2024 03:49 PM    Platelets 332 10/23/2024 03:49 PM    Segmented % 52 10/23/2024 03:49 PM    Lymphocytes % 37 10/23/2024 03:49 PM    Monocytes % 9 10/23/2024 03:49 PM    Eosinophils Relative 1 10/23/2024 03:49 PM    Basophils Relative 1 10/23/2024 03:49 PM    Immature Grans % 0 10/23/2024 03:49 PM    Absolute Neutrophils 3.23 10/23/2024 03:49 PM     Lab Results   Component Value Date/Time    Potassium 4.5 10/23/2024 03:49 PM    Chloride 106 10/23/2024 03:49 PM    CO2 30 10/23/2024 03:49 PM    BUN 23 03/12/2025 09:46 AM    Creatinine 0.79 03/12/2025 09:46 AM    Glucose, Fasting 88 08/26/2024 06:18 AM    Calcium 9.3 10/23/2024 03:49 PM    AST 19 10/23/2024 03:49 PM    ALT 22 10/23/2024 03:49 PM    Alkaline Phosphatase 55 10/23/2024 03:49 PM    Total Protein 7.4 10/23/2024 03:49 PM    Albumin 4.4 10/23/2024 03:49 PM    Total Bilirubin 0.42 10/23/2024 03:49 PM    eGFR 75 03/12/2025 09:46 AM     Pathology Result:         Final Diagnosis   Date Value Ref Range Status   10/28/2024     Final     A. [place] lymph node, right axilla, #1 hot and blue, " excision:   - One lymph node, negative for metastatic carcinoma (0/1), examined on H&E and confirmed by immunohistochemical staining for cytokeratin (AE1/AE3).      B. Breast, right, mastectomy:   - Multifocal invasive lobular carcinoma with focal ductal features.      - Keesha Grade: 2 (3+2+1)      - Number of foci: 4      - Size of individual foci: 11 mm, 8 mm, 3 mm, 3 mm   - All margins are negative for invasive carcinoma (with a clearance of >2 mm).   - Lobular carcinoma in situ (LCIS), classic type, scattered foci.   - The non-neoplastic breast tissue shows moderate usual ductal hyperplasia, apocrine metaplasia, and columnar cell change.   - Nipple skin with Demodex infestation; skin is otherwise unremarkable.   - Biopsy site changes are present (x 2).     C. Lymph node, right axilla, palpable, excision:   - Benign fibroadipose tissue; no lymph node identified.     D. Breast, left, mastectomy:   - Benign breast tissue with moderate usual ductal hyperplasia, intraductal papilloma (2 mm), columnar cell change, and apocrine metaplasia.   - One benign lymph node.        09/27/2024     Final     A. Breast, Right, :  - Invasive mammary carcinoma with ductal and lobular features.   * [place] grade 1 of 3 (total score: 5 of 9)    -- tubule formation < 10%, score 3    -- nuclear grade 1 of 3, score 1    -- mitoses < 3/mm2, (</= 7 mitoses/10HPF), score 1.   * Confirmed by tumor cell immunophenotype:    -- positive: membranous and cytoplasmic stain for p120.    -- negative:  p63, calponin-B, SMMHC, E-Cadherin.     * Invasive carcinoma involves 3 of 6 submitted core biopsies, max. dimension = 8 millimeters.   * Estrogen, progesterone & HER2 receptor studies pending, to be described in a separate receptor report.    * Ductal carcinoma in situ (DCIS): Not identified;   *Multifocal in situ lobular neoplasia (atypical lobular hyperplasia approaching lobular carcinoma in situ)   * Lymph-vascular invasion: Not identified.     * Microcalcifications: Absent.      Note: Regional breast nurse navigator team is notified of the diagnosis via [employer] Secure Chat on 10/01/2024  at 11.40 am.      08/29/2024     Final     A. Breast, Right, stereo specimen 1- 1:00- 6 cores all submitted:  Invasive lobular carcinoma  Favor histologic grade 1 (tubules: 3, nuclei: 2; mitosis: 1; score 6)                Involves 2 out of 6 core tissue fragments              Greatest dimension 5 mm     Lobular carcinoma in situ, focal     See note       11/13/2023: DEXA scan: Low bone mass (osteopenia).  Based on the left femoral neck.     7/19/2024: Bilateral screening mammogram:  RIGHT  1) ARCHITECTURAL DISTORTION [A]: There is questioned architectural distortion involving an asymmetry in the inner posterior right breast.  The asymmetry appears similar to prior mammograms; however, there is questioned distortion which appears slightly more prominent on this examination.   Left  There are no new suspicious masses, grouped microcalcifications or areas of unexplained architectural distortion. The skin and nipple areolar complex are unremarkable.     IMPRESSION:  Additional imaging required.  A breast health care nurse from our facility will be contacting the patient regarding the need for additional imaging.     8/21/2024: Diagnostic right mammogram:  RIGHT  1) ARCHITECTURAL DISTORTION [A]  Mammo diagnostic right w 3d & cad: There is architectural distortion seen in the upper inner quadrant of the inner region of the right breast in the posterior depth.   Right breast ultrasound: Targeted breast ultrasounds performed using dedicated high-resolution probe.  No sonographic correlate is identified for the area of architectural distortion in the upper inner right breast.     IMPRESSION:  Architectural distortion in the upper inner right breast.  Right tomographic guided core needle biopsy is recommended.        9/6/2024: CT chest/abdomen/pelvis:  1) 4.0 x 2.7 x 1.2 cm  collection in the posterior upper right breast just superficial to the right pectoralis major muscle, with a biopsy clip centrally, likely related to the prior biopsy.   2) No convincing evidence of metastatic disease in the chest, abdomen and pelvis.   3) Nonspecific right middle lobe micronodule. Although felt to be unlikely to be a metastasis, given lack of prior studies to establish chronicity, recommend repeat chest CT in 3 months.   4) Several simple cysts in the liver, as well as several subcentimeter hypodense lesions too small to accurate characterize with CT technique. Statistically these likely represent additional cysts, however consider confirmation with abdominal MRI if there are no contraindications versus 3-month follow-up contrast-enhanced CT abdomen.   5) Additional findings as above.     9/9/2024: Bone scan:  1. No scintigraphic evidence of osseous metastasis.      9/10/2024: MRI breast bilateral:  RIGHT  In the upper central right breast at a posterior depth, susceptibility artifact is seen within the anteroinferior aspect of a a small to moderate size hematoma measuring 31 x 12 mm consistent with area of recent biopsy-proven carcinoma.  Of note, at the time of prior biopsy, the marker was noted to be displaced inferiorly by 1 cm.  No discrete suspicious enhancement is seen directly superior to the area of the hematoma however extending up to 1.7 cm anteriorly from the susceptibility artifact and anterior aspect of the hematoma at the 12:00 axis approximately 7 cm from the nipple are 2 similar-appearing foci measuring 4 x 3 x 3 mm more anteriorly (axial 120; sagittal 106) and 4 x 4 x 4 mm (axial 118; sagittal 103), respectively.  A vessel is seen coursing alongside the medial aspect of the more anterior focus.  These appear hypointense on T2 weighted images.  Kinetic evaluation demonstrates delayed plateau enhancement.   Left  There are no suspicious enhancing masses or suspicious areas of  non-mass enhancement.  A subcentimeter benign intramammary lymph node is present at the middle depth at the 3:00 axis.     There is no axillary or internal mammary adenopathy bilaterally.      IMPRESSION:  Biopsy-proven carcinoma in the upper central right breast with embedded susceptibility artifact and expected postbiopsy change.  Two similar-appearing foci at the 12:00 axis of the right breast extending up to 1.7 cm anterior to the patient's site of known carcinoma.  Recommend representative tissue sampling of the more anterior focus as these may represent additional sites of disease.  No MRI evidence of malignancy in the left breast.  No axillary or internal mammary adenopathy     Addendum for clarification of extramammary incidental findings:  There are scattered T2 hyperintense masses throughout the liver without associated visible enhancement corresponding to cysts as characterized on prior CT chest, abdomen and pelvis dated September 6, 2024.    Administrative Statements   I have spent a total time of 42 minutes in caring for this patient on the day of the visit/encounter including Diagnostic results, Prognosis, Risks and benefits of tx options, Instructions for management, Patient and family education, Importance of tx compliance, Counseling / Coordination of care, Documenting in the medical record, Reviewing/placing orders in the medical record (including tests, medications, and/or procedures), and Obtaining or reviewing history  .

## 2025-03-20 ENCOUNTER — OFFICE VISIT (OUTPATIENT)
Dept: HEMATOLOGY ONCOLOGY | Facility: CLINIC | Age: 75
End: 2025-03-20
Payer: MEDICARE

## 2025-03-20 VITALS
HEIGHT: 65 IN | BODY MASS INDEX: 35.49 KG/M2 | RESPIRATION RATE: 18 BRPM | WEIGHT: 213 LBS | SYSTOLIC BLOOD PRESSURE: 124 MMHG | HEART RATE: 77 BPM | DIASTOLIC BLOOD PRESSURE: 86 MMHG | TEMPERATURE: 97 F | OXYGEN SATURATION: 96 %

## 2025-03-20 DIAGNOSIS — Z17.0 MALIGNANT NEOPLASM OF UPPER-INNER QUADRANT OF RIGHT BREAST IN FEMALE, ESTROGEN RECEPTOR POSITIVE (HCC): Primary | ICD-10-CM

## 2025-03-20 DIAGNOSIS — C50.211 MALIGNANT NEOPLASM OF UPPER-INNER QUADRANT OF RIGHT BREAST IN FEMALE, ESTROGEN RECEPTOR POSITIVE (HCC): Primary | ICD-10-CM

## 2025-03-20 DIAGNOSIS — Z79.811 AROMATASE INHIBITOR USE: ICD-10-CM

## 2025-03-20 PROCEDURE — 99215 OFFICE O/P EST HI 40 MIN: CPT | Performed by: INTERNAL MEDICINE

## 2025-03-20 RX ORDER — LETROZOLE 2.5 MG/1
2.5 TABLET, FILM COATED ORAL DAILY
Qty: 90 TABLET | Refills: 1 | Status: SHIPPED | OUTPATIENT
Start: 2025-03-20

## 2025-03-20 NOTE — ASSESSMENT & PLAN NOTE
Cancer Staging   Malignant neoplasm of upper-inner quadrant of right breast in female, estrogen receptor positive (HCC)  Staging form: Breast, AJCC 8th Edition  - Clinical stage from 9/27/2024: Stage IB (cT2, cN0, cM0, G1, ER+, NV+, HER2-) - Signed by Sha Walden MD on 10/7/2024  Stage prefix: Initial diagnosis  Histologic grading system: 3 grade system  - Pathologic stage from 10/28/2024: Stage IA (pT1c, pN0, cM0, G2, ER+, NV+, HER2-) - Signed by Liza Woodall DO on 12/15/2024  Stage prefix: Initial diagnosis  Multigene prognostic tests performed: None  Histologic grading system: 3 grade system    1.  Stage IA (pT1c(m) pN0(sn) cM0) right breast invasive lobular carcinoma.  Grade 2.  Greatest dimension of largest invasive focus 11 mm.  ER/NV positive, HER2 negative.  MammaPrint: Low risk.  Diagnosed October 2024.      Samaria Barger is a 74 y.o postmenopausal female with stage IA right breast invasive lobular carcinoma, ER/NV positive, HER2 negative. She was noted to have abnormal routine screening mammogram which subsequently led to further diagnostic workup. CT scan of the chest, abdomen, pelvis noted nonspecific right middle lobe micronodule and patient elected to repeat CT chest in 3 months.  She was also noted to have cyst in the liver and is to repeat scans in 3 months.  She underwent right breast mastectomy and left breast mastectomy on 10/28/2024.  Final pathology was consistent with a multifocal invasive lobular carcinoma with focal ductal features on the right, size of individual foci 11 mm, 8 mm, 3 mm, 3 mm, negative margins, lymph node negative for metastatic carcinoma.  Left breast mastectomy noted benign breast tissue with moderate usual ductal hyperplasia, intraductal papilloma, columnar cell change and apocrine metaplasia, 1 lymph node benign.  MammaPrint was low risk.     She is currently receiving adjuvant endocrine therapy with anastrozole 1 mg daily (started December 2024).   Patient notes mild weight gain and worsening of her knee joint pain (R>L).  Lengthy discussion with the patient and her son.  Discussed various options. Recommend low impact exercises like swimming, yoga and stretching.  For pain she may try Tylenol.  Discussed trial of glucosamine chondroitin, but patient does not want to take another medication.  Discussed switching to a different aromatase inhibitor like letrozole which patient was agreeable.  Informed consent signed.  Patient was given a printout of the medication.    She will follow-up in 3 months to assess tolerability.    Orders:    letrozole (FEMARA) 2.5 mg tablet; Take 1 tablet (2.5 mg total) by mouth daily

## 2025-03-26 ENCOUNTER — OFFICE VISIT (OUTPATIENT)
Dept: INTERNAL MEDICINE CLINIC | Facility: CLINIC | Age: 75
End: 2025-03-26
Payer: MEDICARE

## 2025-03-26 VITALS
BODY MASS INDEX: 34.49 KG/M2 | DIASTOLIC BLOOD PRESSURE: 70 MMHG | WEIGHT: 207 LBS | HEIGHT: 65 IN | OXYGEN SATURATION: 95 % | SYSTOLIC BLOOD PRESSURE: 112 MMHG | TEMPERATURE: 97.5 F | HEART RATE: 86 BPM

## 2025-03-26 DIAGNOSIS — E66.09 CLASS 1 OBESITY DUE TO EXCESS CALORIES WITH SERIOUS COMORBIDITY AND BODY MASS INDEX (BMI) OF 34.0 TO 34.9 IN ADULT: Primary | ICD-10-CM

## 2025-03-26 DIAGNOSIS — E66.811 CLASS 1 OBESITY DUE TO EXCESS CALORIES WITH SERIOUS COMORBIDITY AND BODY MASS INDEX (BMI) OF 34.0 TO 34.9 IN ADULT: Primary | ICD-10-CM

## 2025-03-26 DIAGNOSIS — E66.09 CLASS 1 OBESITY DUE TO EXCESS CALORIES WITH SERIOUS COMORBIDITY AND BODY MASS INDEX (BMI) OF 34.0 TO 34.9 IN ADULT: ICD-10-CM

## 2025-03-26 DIAGNOSIS — E66.811 CLASS 1 OBESITY DUE TO EXCESS CALORIES WITH SERIOUS COMORBIDITY AND BODY MASS INDEX (BMI) OF 34.0 TO 34.9 IN ADULT: ICD-10-CM

## 2025-03-26 PROCEDURE — G2211 COMPLEX E/M VISIT ADD ON: HCPCS | Performed by: INTERNAL MEDICINE

## 2025-03-26 PROCEDURE — 99214 OFFICE O/P EST MOD 30 MIN: CPT | Performed by: INTERNAL MEDICINE

## 2025-03-26 RX ORDER — TIRZEPATIDE 2.5 MG/.5ML
2.5 INJECTION, SOLUTION SUBCUTANEOUS WEEKLY
Qty: 2 ML | Refills: 0 | Status: SHIPPED | OUTPATIENT
Start: 2025-03-26 | End: 2025-04-23

## 2025-03-26 NOTE — PROGRESS NOTES
Name: Samaria Barger      : 1950      MRN: [id number]  Encounter Provider: Lea Reyes, MD  Encounter Date: 3/26/2025   Encounter department: [facility]    Assessment & Plan  Class 1 obesity due to excess calories with serious comorbidity and body mass index (BMI) of 34.0 to 34.9 in adult  Prior Authorization Clinical Questions for Weight Management Pharmacotherapy    1. Does the patient have a contrainidcation to medication prescribed for weight management?: No  2. Does the patient have a diagnosis of obesity, confirmed by a BMI greater than or equal to 30 kg/m^2?: Yes  3. Does the patient have a BMI of greater than or equal to 27 kg/m^2 with at least one weight-related comorbidity/risk factor/complication (e.g. diabetes, dyslipidemia, coronary artery disease)?: Yes  4. Weight-related co-morbidities/risk factors: dyslipidemia  5. WEGOVY CVA Indication: Does patient have established documented cardiovascular disease (history of a prior heart attack (myocardial infarction), stroke, or symptomatic peripheral arterial disease (PAD)?: No  6. ZEPBOUND HOLLY Indication: Does patient have documented HOLLY diagnosed via sleep study (insurance will require copy of sleep study results for approval)?: No  7. Has the patient been on a weight loss regimen of low-calorie diet, increased physical activity, and lifestyle modifications for a minimum of 6 months?: Yes  8. Has the patient completed a comprehensive weight loss program (ie, Weight Watchers, Noom, Bariatrics, other jason on phone)? If so, what?: Yes  9. Does the patient have a history of type 2 diabetes?: No  10. Has the member tried and failed other weight loss medication within the past 12 months?: No  11. Will the member use requested medication in combination with another GLP agonist or weight loss drug?: No     Baseline weight (in pounds): 207 lbs       Discussed that it is highly unlikely that this would be covered for her but she would like  to try to see what might be covered by her secondary insurance if prescription was sent  Considering phentermine  Highly recommend weight management evaluation  Orders:  •  tirzepatide (Zepbound) 2.5 mg/0.5 mL auto-injector; Inject 0.5 mL (2.5 mg total) under the skin once a week for 28 days  •  Ambulatory Referral to Weight Management; Future         History of Present Illness     Here to discuss her weight  Difficulty losing weight in spite of low calorie diet and regular exercise  Interested in weight loss treatment  No history of pancreatitis, gastroparesis, thyroid cancer      Review of Systems   Constitutional:  Negative for unexpected weight change.   Respiratory:  Negative for shortness of breath.    Cardiovascular:  Negative for chest pain and palpitations.   Gastrointestinal:  Negative for abdominal pain, constipation and diarrhea.     Past Medical History:   Diagnosis Date   • Anxiety    • Bradycardia 04/03/2020   • Cancer (HCC)    • Lightheaded 01/17/2022   • Vertigo      Past Surgical History:   Procedure Laterality Date   • BREAST BIOPSY     • EYE SURGERY      Cataract surgery in both eyes  in May 2022   • LYMPH NODE BIOPSY Right 10/28/2024    Procedure: LYMPHATIC MAPPING WITH BLUE AND RADIOACTIVE DYES, SENTINEL LYMPH NODE BIOPSY, INJECTION IN OR AT 1330 BY DR WALDEN;  Surgeon: Sha Walden MD;  Location: [facility];  Service: Surgical Oncology   • MAMMO NEEDLE LOCALIZATION LEFT (ALL INC) Right 10/10/2024   • MAMMO NEEDLE LOCALIZATION LEFT (ALL INC) EACH ADD Right 10/10/2024   • MAMMO STEREOTACTIC BREAST BIOPSY RIGHT (ALL INC) Right 08/29/2024   • MAMMO STEREOTACTIC BREAST BIOPSY RIGHT (ALL INC) Right 08/29/2024   • MRI BREAST BIOPSY RIGHT (ALL INCLUSIVE) Right 09/27/2024   • SIMPLE MASTECTOMY Bilateral 10/28/2024    Procedure: RIGHT BREAST MASTECTOMY, LEFT TOTAL MASTECTOMY, DARRON CLIPS IN THE BREASTS;  Surgeon: Sha Walden MD;  Location: [facility];  Service: Surgical  Oncology     Family History   Problem Relation Age of Onset   • Dementia Mother    • Heart attack Father    • No Known Problems Brother    • No Known Problems Brother    • No Known Problems Son    • Hyperlipidemia Son    • No Known Problems Son    • No Known Problems Daughter    • No Known Problems Maternal Grandmother    • No Known Problems Maternal Grandfather    • No Known Problems Paternal Grandmother    • No Known Problems Paternal Grandfather    • Brain cancer Maternal Aunt    • Coronary artery disease Maternal Uncle      Social History     Tobacco Use   • Smoking status: Never   • Smokeless tobacco: Never   Vaping Use   • Vaping status: Never Used   Substance and Sexual Activity   • Alcohol use: Not Currently     Comment: 1 per month   • Drug use: Never   • Sexual activity: Not Currently     Birth control/protection: Post-menopausal     Current Outpatient Medications on File Prior to Visit   Medication Sig   • Calcium Ascorbate (VITAMIN C) 500 mg tablet Take 500 mg by mouth daily   • Calcium Carb-Cholecalciferol (CALCIUM 1000 + D PO) Take 1 tablet by mouth daily   • letrozole (FEMARA) 2.5 mg tablet Take 1 tablet (2.5 mg total) by mouth daily   • LORazepam (Ativan) 0.5 mg tablet Take one tablet 30 min before the MRI, then one tablet immediately before the MRI. Do not drive.   • Lysine 500 MG CAPS Take 1 capsule by mouth daily   • Multiple Vitamin (MULTIVITAMIN ADULT PO) Take 1 tablet by mouth daily   • rosuvastatin (CRESTOR) 10 MG tablet TAKE 1 TABLET BY MOUTH EVERY DAY   • Vitamin D, Cholecalciferol, 50 MCG (2000 UT) CAPS Take 1 tablet by mouth daily   • vitamin E, tocopherol, 400 units capsule Take 400 Units by mouth daily   • [DISCONTINUED] naloxone (NARCAN) 4 mg/0.1 mL nasal spray Administer 1 spray into a nostril. If no response after 2-3 minutes, give another dose in the other nostril using a new spray. (Patient not taking: Reported on 12/10/2024)     Allergies   Allergen Reactions   • Atorvastatin  "Arthralgia     Immunization History   Administered Date(s) Administered   • COVID-19 PFIZER VACCINE 0.3 ML IM 03/18/2021, 04/11/2021, 10/28/2021   • COVID-19 Pfizer Vac BIVALENT Sim-sucrose 12 Yr+ IM 10/14/2022   • COVID-19 Pfizer vac (Sim-sucrose, gray cap) 12 yr+ IM 04/12/2022   • INFLUENZA 10/04/2017, 09/05/2020, 10/14/2021, 09/21/2022, 09/20/2023   • Influenza Split High Dose Preservative Free IM 09/13/2024   • Pneumococcal Conjugate 13-Valent 07/24/2020   • Pneumococcal Polysaccharide PPV23 07/27/2021   • Tdap 08/01/2022   • Zoster Vaccine Recombinant 09/07/2021, 12/07/2021     Objective   /70 (BP Location: Left arm, Patient Position: Sitting, Cuff Size: Large)   Pulse 86   Temp 97.5 °F (36.4 °C) (Tympanic)   Ht 5' 5\" (1.651 m)   Wt 93.9 kg (207 lb)   SpO2 95%   BMI 34.45 kg/m²     Physical Exam  Constitutional:       General: She is not in acute distress.     Appearance: She is well-developed. She is not ill-appearing, toxic-appearing or diaphoretic.   Eyes:      Conjunctiva/sclera: Conjunctivae normal.   Cardiovascular:      Rate and Rhythm: Normal rate and regular rhythm.      Heart sounds: Normal heart sounds. No murmur heard.  Pulmonary:      Effort: Pulmonary effort is normal. No respiratory distress.      Breath sounds: Normal breath sounds. No stridor. No wheezing or rales.   Abdominal:      General: There is no distension.      Palpations: Abdomen is soft. There is no mass.      Tenderness: There is no abdominal tenderness. There is no guarding or rebound.   Musculoskeletal:      Cervical back: Neck supple.   Neurological:      Mental Status: She is alert and oriented to person, place, and time.   Psychiatric:         Mood and Affect: Mood normal.         Behavior: Behavior normal.         Thought Content: Thought content normal.         Judgment: Judgment normal.         "

## 2025-03-27 RX ORDER — TIRZEPATIDE 2.5 MG/.5ML
2.5 INJECTION, SOLUTION SUBCUTANEOUS WEEKLY
Refills: 0 | OUTPATIENT
Start: 2025-03-27 | End: 2025-04-24

## 2025-03-27 NOTE — TELEPHONE ENCOUNTER
Please call the patient that Zepbound injection is not in her formulary so is not covered by her plan

## 2025-04-01 ENCOUNTER — CLINICAL SUPPORT (OUTPATIENT)
Age: 75
End: 2025-04-01

## 2025-04-01 VITALS — WEIGHT: 208 LBS | HEIGHT: 65 IN | BODY MASS INDEX: 34.66 KG/M2

## 2025-04-01 DIAGNOSIS — E66.811 CLASS 1 OBESITY DUE TO EXCESS CALORIES WITH SERIOUS COMORBIDITY AND BODY MASS INDEX (BMI) OF 34.0 TO 34.9 IN ADULT: ICD-10-CM

## 2025-04-01 DIAGNOSIS — R63.5 ABNORMAL WEIGHT GAIN: Primary | ICD-10-CM

## 2025-04-01 DIAGNOSIS — E66.09 CLASS 1 OBESITY DUE TO EXCESS CALORIES WITH SERIOUS COMORBIDITY AND BODY MASS INDEX (BMI) OF 34.0 TO 34.9 IN ADULT: ICD-10-CM

## 2025-04-01 PROCEDURE — WMDI30

## 2025-04-01 PROCEDURE — RECHECK

## 2025-04-01 NOTE — PROGRESS NOTES
Weight Management Medical Nutrition Assessment    Samaria presented for a meal planning session. Today's weight is 208lb. She is a pleasure to talk to and is doing her best to achieve her weight loss goals at this time. She is unfortunately not approved for weight loss medication through her insurance via Dr. Reyes; however, she can always set up an appt here with one of our providers. Based on her past few years, she has been taking medication that can influence weight gain unfortunately. Given the age and being through menopause, weight loss can be difficult -- but not impossible! At this time, following a diet of lower carbohydrate with focus on healthy fats, lean proteins, fruits and vegetables, and higher fiber carbs will help to nourish her body, reduce inflammation, and meet proteni needs (not being met at this time).     Developed and reviewed a low calorie meal plan:  Nutrition Prescription  Calories:2504-6035  Protein:75g  Fluid:64    Patient seen by Medical Provider in past 6 months:  no  Requested to schedule appointment with Medical Provider: No      Anthropometric Measurements  Start Weight (#) & Date: 4/1/25  Current Weight (#): 208  TBW % Change from start weight:-  Ideal Body Weight (#):125  Goal Weight (#):180#   Highest: Current  Lowest:    Weight Loss History  Previous weight loss attempts: Counseling with  MD  Exercise  Nutrition Counseling with RD  Self Created Diets (Portion Control, Healthy Food Choices, etc.)    Food and Nutrition Related History  Wake up: -   Bed Time:-    Food Recall  Breakfast:Cereal (Cheerios type) 1% milk (finishes milk) or Yogurt (Greek low sugar); green tea (decaf) with water    Snack:none   Lunch:Not hungry sometimes skip and have an early dinner, does not like to eat past 6pm   Snack:fruits (bananas, pears, apples, clementines, grapes)   Dinner:might have soup (chicken - [employer] homemade soup) maybe with 2 crackers or salad with grilled chicken or fish - salad (light  italian dressing, cucumbers, tomatoes, uses all kinds);   Snack:does not have snack       Beverages: water, sugar free beverages, and decaf coffee/tea  Volume of beverage intake: -    Weekends: Same  Cravings: sweets   Trouble area of day:after 6pm    Frequency of Eating out: biweekly  Food restrictions:-  Cooking: self   Food Shopping: self    Physical Activity Intake  Activity:Strength Training and Stretching, bands, and lifting soup cans and water bottles   Frequency:several times per week  Physical limitations/barriers to exercise: knees are a bit of an issue     Estimated Needs  Energy  Vicente Garcia Energy Needs: BMR : 1450   1-2# loss weekly sedentary:  740-1240             1-2# loss weekly lightly active:993-1493  Maintenance calories for sedentary activity level: 1740  Protein:68-86      (1.2-1.5g/kg IBW)  Fluid Requirement Calculator   Total Fluid: 67   oz  (Anamaria-Segar Method)  Free Fluid: 54 oz (Anamaria-Segar Method - 20%)    Nutrition Diagnosis  Yes;    Excessive energy intake  related to Food and nutrition related knowledge deficit concerning energy intake as evidenced by  BMI >25 for adults       Nutrition Intervention    Nutrition Prescription  Calories:8784-6951  Protein:75g  Fluid:64    Meal Plan (Iam/Pro/Carb)  Breakfast: 300 iam/20g PRO  Snack: 150-200 iam/10g PRO  Lunch: 400 iam/20g PRO  Snack:   Dinner: 400 iam/30g PRO  Snack:    Nutrition Education:    Calorie controlled menu  Lean protein food choices  Healthy snack options  Food journaling tips      Nutrition Counseling:  Strategies: meal planning, portion sizes, healthy snack choices, hydration, fiber intake, protein intake, exercise, food journal      Monitoring and Evaluation:  Evaluation criteria:  Energy Intake  Meet protein needs  Maintain adequate hydration  Monitor weekly weight  Meal planning/preparation  Food journal   Decreased portions at mealtimes and snacks  Physical activity     Barriers to learning:none  Readiness to  change: Preparation:  (Getting ready to change)   Comprehension: very good  Expected Compliance: very good

## 2025-05-08 ENCOUNTER — ESTABLISHED COMPREHENSIVE EXAM (OUTPATIENT)
Dept: URBAN - METROPOLITAN AREA CLINIC 6 | Facility: CLINIC | Age: 75
End: 2025-05-08

## 2025-05-08 DIAGNOSIS — H43.811: ICD-10-CM

## 2025-05-08 DIAGNOSIS — H35.363: ICD-10-CM

## 2025-05-08 DIAGNOSIS — H18.453: ICD-10-CM

## 2025-05-08 PROCEDURE — 92134 CPTRZ OPH DX IMG PST SGM RTA: CPT

## 2025-05-08 PROCEDURE — 92014 COMPRE OPH EXAM EST PT 1/>: CPT

## 2025-05-08 ASSESSMENT — VISUAL ACUITY
OD_CC: 20/25
OS_CC: 20/25-2

## 2025-05-08 ASSESSMENT — KERATOMETRY
OD_K1POWER_DIOPTERS: 41.50
OS_AXISANGLE_DEGREES: 175
OS_AXISANGLE2_DEGREES: 85
OD_AXISANGLE_DEGREES: 78
OS_K2POWER_DIOPTERS: 43.50
OD_AXISANGLE2_DEGREES: 168
OD_K2POWER_DIOPTERS: 42.50
OS_K1POWER_DIOPTERS: 42.00

## 2025-05-08 ASSESSMENT — TONOMETRY
OD_IOP_MMHG: 13
OS_IOP_MMHG: 11

## 2025-06-02 ENCOUNTER — TELEPHONE (OUTPATIENT)
Dept: OBGYN CLINIC | Facility: OTHER | Age: 75
End: 2025-06-02

## 2025-06-16 ENCOUNTER — TELEPHONE (OUTPATIENT)
Facility: HOSPITAL | Age: 75
End: 2025-06-16

## 2025-06-16 NOTE — TELEPHONE ENCOUNTER
Left voicemail for appointment reminder and to review insurance benefits for mastectomy dylon moran. Call back number is [phone]

## 2025-06-17 ENCOUNTER — CLINICAL SUPPORT (OUTPATIENT)
Dept: OBGYN CLINIC | Facility: OTHER | Age: 75
End: 2025-06-17

## 2025-06-17 DIAGNOSIS — C50.211 MALIGNANT NEOPLASM OF UPPER-INNER QUADRANT OF RIGHT BREAST IN FEMALE, ESTROGEN RECEPTOR POSITIVE (HCC): Primary | ICD-10-CM

## 2025-06-17 DIAGNOSIS — Z17.0 MALIGNANT NEOPLASM OF UPPER-INNER QUADRANT OF RIGHT BREAST IN FEMALE, ESTROGEN RECEPTOR POSITIVE (HCC): Primary | ICD-10-CM

## 2025-06-17 NOTE — PROGRESS NOTES
Mastectomy Bra Fitting Order Details    Samaria Barger  1950  [id number]    Reason For Visit  Mastectomy bra and prosthesis     Precautions   History of breast cancer    Subjective  Samaria has returned to the mastectomy boutique today. She is wearing the leisure form     Surgery Type: Mastectomy    Lymph node removal yes    Date of surgery 10/28/2024    Surgical side bilateral    Objective  Samaria is wearing forms and Gloria bra however band shifts upward. Sized down to a large for Gloria and tried on molded cup bra with good fit    Assessment  Band - 18.5 x 2= 37+ 3 = 40 - from 12/2024    Plan  Ship to home. She was educated on the wear and care of her products.     Order Details   Vera 42A nude x 2   Vera 42A heather nude x 1   Leisure form size 7 x 4  ** future orders- she is a size large for gloria**

## 2025-07-09 DIAGNOSIS — E78.2 MIXED HYPERLIPIDEMIA: ICD-10-CM

## 2025-07-09 RX ORDER — ROSUVASTATIN CALCIUM 10 MG/1
10 TABLET, COATED ORAL DAILY
Qty: 90 TABLET | Refills: 1 | Status: SHIPPED | OUTPATIENT
Start: 2025-07-09

## 2025-08-20 DIAGNOSIS — R39.9 UTI SYMPTOMS: Primary | ICD-10-CM

## 2025-08-20 RX ORDER — AMOXICILLIN 500 MG/1
500 CAPSULE ORAL EVERY 8 HOURS SCHEDULED
Qty: 15 CAPSULE | Refills: 0 | Status: SHIPPED | OUTPATIENT
Start: 2025-08-20 | End: 2025-08-25

## (undated) DEVICE — SUT MONOCRYL 4-0 PS-2 18 IN Y496G

## (undated) DEVICE — MEDI-VAC YANK SUCT HNDL W/TPRD BULBOUS TIP: Brand: CARDINAL HEALTH

## (undated) DEVICE — BULB SYRINGE,IRRIGATION WITH PROTECTIVE CAP: Brand: DOVER

## (undated) DEVICE — POV-IOD SOLUTION 4OZ BT

## (undated) DEVICE — DRAPE EQUIPMENT RF WAND

## (undated) DEVICE — GLOVE SRG BIOGEL ORTHOPEDIC 7.5

## (undated) DEVICE — JACKSON-PRATT 100CC BULB RESERVOIR: Brand: CARDINAL HEALTH

## (undated) DEVICE — SUT VICRYL 2-0 REEL 54 IN J286G

## (undated) DEVICE — JP CHANNEL DRAIN 19FR, FULL FLUTES: Brand: JACKSON-PRATT

## (undated) DEVICE — SUT VICRYL 3-0 SH 27 IN J416H

## (undated) DEVICE — DRAPE SHEET THREE QUARTER

## (undated) DEVICE — PAD GROUNDING DUAL ADULT

## (undated) DEVICE — ELECTRODE NEEDLE E-Z CLEAN 2.75IN 7CM -0013

## (undated) DEVICE — PACK UNIVERSAL DRAPES SUB-Q ICD

## (undated) DEVICE — SHEATH, GUIDE, SAVI SCOUT®: Brand: SAVI SCOUT®

## (undated) DEVICE — TUBING SUCTION 5MM X 12 FT

## (undated) DEVICE — GLOVE INDICATOR PI UNDERGLOVE SZ 7.5 BLUE

## (undated) DEVICE — PENCIL SMOKE EVAC TELESCOPING W/TUBING

## (undated) DEVICE — SUT ETHILON 2-0 PS 18 IN 585H

## (undated) DEVICE — HEMOSTAT POWDER ADSORB SURGICEL 3GM

## (undated) DEVICE — ADHESIVE SKIN CLOSURE SYS EXOFIN FUSION 22CM

## (undated) DEVICE — SUT VICRYL 2-0 SH 27 IN UNDYED J417H

## (undated) DEVICE — SUT MONOCRYL 3-0 PS-2 27 IN Y427H

## (undated) DEVICE — TOWEL SET X-RAY

## (undated) DEVICE — GLOVE SRG BIOGEL 7

## (undated) DEVICE — INTENDED FOR TISSUE SEPARATION, AND OTHER PROCEDURES THAT REQUIRE A SHARP SURGICAL BLADE TO PUNCTURE OR CUT.: Brand: BARD-PARKER SAFETY BLADES SIZE 15, STERILE

## (undated) DEVICE — DRAIN SPONGES,6 PLY: Brand: EXCILON

## (undated) DEVICE — CAUTERY TIP POLISHER: Brand: DEVON

## (undated) DEVICE — BETHLEHEM UNIVERSAL MINOR GEN: Brand: CARDINAL HEALTH

## (undated) DEVICE — ELECTRODE BLADE MOD E-Z CLEAN 2.5IN 6.4CM -0012M

## (undated) DEVICE — LIGACLIP MCA MULTIPLE CLIP APPLIERS, 20 MEDIUM CLIPS: Brand: LIGACLIP

## (undated) DEVICE — ABDOMINAL PAD: Brand: DERMACEA

## (undated) DEVICE — GAUZE SPONGES,USP TYPE VII GAUZE, 12 PLY: Brand: CURITY

## (undated) DEVICE — TELFA NON-ADHERENT ABSORBENT DRESSING: Brand: TELFA

## (undated) DEVICE — 3M™ STERI-STRIP™ REINFORCED ADHESIVE SKIN CLOSURES, R1547, 1/2 IN X 4 IN (12 MM X 100 MM), 6 STRIPS/ENVELOPE: Brand: 3M™ STERI-STRIP™

## (undated) DEVICE — BETHLEHEM UNIVERSAL BREAST PK: Brand: CARDINAL HEALTH

## (undated) DEVICE — 4-PORT MANIFOLD: Brand: NEPTUNE 2

## (undated) DEVICE — SUT SILK 2-0 SH 30 IN K833H

## (undated) DEVICE — SUT MONOCRYL 3-0 PS-2 18 IN Y497G

## (undated) RX ORDER — BROMFENAC SODIUM 0.7 MG/ML
1 SOLUTION/ DROPS OPHTHALMIC ONCE A DAY
End: 2022-06-08